# Patient Record
Sex: FEMALE | Race: WHITE | NOT HISPANIC OR LATINO
[De-identification: names, ages, dates, MRNs, and addresses within clinical notes are randomized per-mention and may not be internally consistent; named-entity substitution may affect disease eponyms.]

---

## 2017-01-09 ENCOUNTER — MEDICATION RENEWAL (OUTPATIENT)
Age: 72
End: 2017-01-09

## 2017-01-23 ENCOUNTER — APPOINTMENT (OUTPATIENT)
Dept: WOUND CARE | Facility: CLINIC | Age: 72
End: 2017-01-23

## 2017-01-27 ENCOUNTER — MEDICATION RENEWAL (OUTPATIENT)
Age: 72
End: 2017-01-27

## 2017-01-28 ENCOUNTER — MEDICATION RENEWAL (OUTPATIENT)
Age: 72
End: 2017-01-28

## 2017-01-30 ENCOUNTER — RX RENEWAL (OUTPATIENT)
Age: 72
End: 2017-01-30

## 2017-01-30 ENCOUNTER — APPOINTMENT (OUTPATIENT)
Dept: WOUND CARE | Facility: CLINIC | Age: 72
End: 2017-01-30

## 2017-02-09 ENCOUNTER — APPOINTMENT (OUTPATIENT)
Dept: WOUND CARE | Facility: CLINIC | Age: 72
End: 2017-02-09

## 2017-02-16 ENCOUNTER — APPOINTMENT (OUTPATIENT)
Dept: RADIOLOGY | Facility: IMAGING CENTER | Age: 72
End: 2017-02-16

## 2017-02-16 ENCOUNTER — OUTPATIENT (OUTPATIENT)
Dept: OUTPATIENT SERVICES | Facility: HOSPITAL | Age: 72
LOS: 1 days | End: 2017-02-16
Payer: MEDICARE

## 2017-02-16 ENCOUNTER — APPOINTMENT (OUTPATIENT)
Dept: WOUND CARE | Facility: CLINIC | Age: 72
End: 2017-02-16

## 2017-02-16 DIAGNOSIS — R06.2 WHEEZING: ICD-10-CM

## 2017-02-16 DIAGNOSIS — Z00.8 ENCOUNTER FOR OTHER GENERAL EXAMINATION: ICD-10-CM

## 2017-02-16 LAB
ALBUMIN SERPL ELPH-MCNC: 4.2 G/DL
ALP BLD-CCNC: 84 U/L
ALT SERPL-CCNC: 20 U/L
ANION GAP SERPL CALC-SCNC: 13 MMOL/L
AST SERPL-CCNC: 21 U/L
BASOPHILS # BLD AUTO: 0.02 K/UL
BASOPHILS NFR BLD AUTO: 0.3 %
BILIRUB SERPL-MCNC: 0.4 MG/DL
BUN SERPL-MCNC: 13 MG/DL
CALCIUM SERPL-MCNC: 9.3 MG/DL
CHLORIDE SERPL-SCNC: 104 MMOL/L
CHOLEST SERPL-MCNC: 189 MG/DL
CHOLEST/HDLC SERPL: 2.7 RATIO
CO2 SERPL-SCNC: 25 MMOL/L
CREAT SERPL-MCNC: 0.79 MG/DL
EOSINOPHIL # BLD AUTO: 0.09 K/UL
EOSINOPHIL NFR BLD AUTO: 1.2 %
GLUCOSE SERPL-MCNC: 109 MG/DL
HCT VFR BLD CALC: 43.9 %
HDLC SERPL-MCNC: 69 MG/DL
HGB BLD-MCNC: 13.8 G/DL
IMM GRANULOCYTES NFR BLD AUTO: 0.1 %
LDLC SERPL CALC-MCNC: 96 MG/DL
LYMPHOCYTES # BLD AUTO: 1.45 K/UL
LYMPHOCYTES NFR BLD AUTO: 19 %
MAN DIFF?: NORMAL
MCHC RBC-ENTMCNC: 27.8 PG
MCHC RBC-ENTMCNC: 31.4 GM/DL
MCV RBC AUTO: 88.5 FL
MONOCYTES # BLD AUTO: 0.57 K/UL
MONOCYTES NFR BLD AUTO: 7.5 %
NEUTROPHILS # BLD AUTO: 5.51 K/UL
NEUTROPHILS NFR BLD AUTO: 71.9 %
PLATELET # BLD AUTO: 252 K/UL
POTASSIUM SERPL-SCNC: 4.3 MMOL/L
PROT SERPL-MCNC: 6.5 G/DL
RBC # BLD: 4.96 M/UL
RBC # FLD: 14.8 %
SODIUM SERPL-SCNC: 142 MMOL/L
TRIGL SERPL-MCNC: 119 MG/DL
WBC # FLD AUTO: 7.65 K/UL

## 2017-02-16 PROCEDURE — 71046 X-RAY EXAM CHEST 2 VIEWS: CPT

## 2017-02-23 ENCOUNTER — APPOINTMENT (OUTPATIENT)
Dept: VASCULAR SURGERY | Facility: CLINIC | Age: 72
End: 2017-02-23

## 2017-02-23 ENCOUNTER — APPOINTMENT (OUTPATIENT)
Dept: WOUND CARE | Facility: CLINIC | Age: 72
End: 2017-02-23

## 2017-02-24 LAB
ESTIMATED AVERAGE GLUCOSE: 108 MG/DL
HBA1C MFR BLD HPLC: 5.4 %

## 2017-03-02 ENCOUNTER — APPOINTMENT (OUTPATIENT)
Dept: WOUND CARE | Facility: CLINIC | Age: 72
End: 2017-03-02

## 2017-03-02 DIAGNOSIS — B35.1 TINEA UNGUIUM: ICD-10-CM

## 2017-03-02 DIAGNOSIS — L60.0 TINEA UNGUIUM: ICD-10-CM

## 2017-03-06 ENCOUNTER — MEDICATION RENEWAL (OUTPATIENT)
Age: 72
End: 2017-03-06

## 2017-03-09 ENCOUNTER — APPOINTMENT (OUTPATIENT)
Dept: WOUND CARE | Facility: CLINIC | Age: 72
End: 2017-03-09

## 2017-03-09 ENCOUNTER — MEDICATION RENEWAL (OUTPATIENT)
Age: 72
End: 2017-03-09

## 2017-03-16 ENCOUNTER — APPOINTMENT (OUTPATIENT)
Dept: WOUND CARE | Facility: CLINIC | Age: 72
End: 2017-03-16

## 2017-03-21 ENCOUNTER — APPOINTMENT (OUTPATIENT)
Dept: WOUND CARE | Facility: CLINIC | Age: 72
End: 2017-03-21

## 2017-03-30 ENCOUNTER — APPOINTMENT (OUTPATIENT)
Dept: WOUND CARE | Facility: CLINIC | Age: 72
End: 2017-03-30

## 2017-04-11 ENCOUNTER — APPOINTMENT (OUTPATIENT)
Dept: WOUND CARE | Facility: CLINIC | Age: 72
End: 2017-04-11

## 2017-04-11 VITALS
BODY MASS INDEX: 53.92 KG/M2 | SYSTOLIC BLOOD PRESSURE: 132 MMHG | WEIGHT: 293 LBS | RESPIRATION RATE: 16 BRPM | DIASTOLIC BLOOD PRESSURE: 84 MMHG | TEMPERATURE: 98.6 F | HEIGHT: 62 IN

## 2017-04-11 DIAGNOSIS — H54.7 UNSPECIFIED VISUAL LOSS: ICD-10-CM

## 2017-04-27 ENCOUNTER — APPOINTMENT (OUTPATIENT)
Dept: WOUND CARE | Facility: CLINIC | Age: 72
End: 2017-04-27

## 2017-04-27 DIAGNOSIS — L03.116 CELLULITIS OF LEFT LOWER LIMB: ICD-10-CM

## 2017-04-27 DIAGNOSIS — S81.809S UNSPECIFIED OPEN WOUND, UNSPECIFIED LOWER LEG, SEQUELA: ICD-10-CM

## 2017-05-18 ENCOUNTER — APPOINTMENT (OUTPATIENT)
Dept: WOUND CARE | Facility: CLINIC | Age: 72
End: 2017-05-18

## 2017-05-18 DIAGNOSIS — L30.9 DERMATITIS, UNSPECIFIED: ICD-10-CM

## 2017-06-09 ENCOUNTER — APPOINTMENT (OUTPATIENT)
Dept: FAMILY MEDICINE | Facility: CLINIC | Age: 72
End: 2017-06-09

## 2017-06-09 VITALS
SYSTOLIC BLOOD PRESSURE: 150 MMHG | WEIGHT: 290 LBS | DIASTOLIC BLOOD PRESSURE: 94 MMHG | HEIGHT: 62 IN | BODY MASS INDEX: 53.37 KG/M2

## 2017-06-23 ENCOUNTER — APPOINTMENT (OUTPATIENT)
Dept: FAMILY MEDICINE | Facility: CLINIC | Age: 72
End: 2017-06-23

## 2017-08-08 ENCOUNTER — APPOINTMENT (OUTPATIENT)
Dept: FAMILY MEDICINE | Facility: CLINIC | Age: 72
End: 2017-08-08
Payer: MEDICARE

## 2017-08-08 VITALS
SYSTOLIC BLOOD PRESSURE: 160 MMHG | HEIGHT: 62 IN | DIASTOLIC BLOOD PRESSURE: 90 MMHG | BODY MASS INDEX: 53.37 KG/M2 | WEIGHT: 290 LBS

## 2017-08-08 VITALS — SYSTOLIC BLOOD PRESSURE: 140 MMHG | DIASTOLIC BLOOD PRESSURE: 86 MMHG

## 2017-08-08 DIAGNOSIS — E78.00 PURE HYPERCHOLESTEROLEMIA, UNSPECIFIED: ICD-10-CM

## 2017-08-08 DIAGNOSIS — Z00.00 ENCOUNTER FOR GENERAL ADULT MEDICAL EXAMINATION W/OUT ABNORMAL FINDINGS: ICD-10-CM

## 2017-08-08 PROCEDURE — 99214 OFFICE O/P EST MOD 30 MIN: CPT

## 2017-08-09 LAB
ALBUMIN SERPL ELPH-MCNC: 4.2 G/DL
ALP BLD-CCNC: 75 U/L
ALT SERPL-CCNC: 20 U/L
ANION GAP SERPL CALC-SCNC: 14 MMOL/L
APPEARANCE: ABNORMAL
AST SERPL-CCNC: 23 U/L
BACTERIA: ABNORMAL
BASOPHILS # BLD AUTO: 0.02 K/UL
BASOPHILS NFR BLD AUTO: 0.4 %
BILIRUB SERPL-MCNC: 0.7 MG/DL
BILIRUBIN URINE: NEGATIVE
BLOOD URINE: NEGATIVE
BUN SERPL-MCNC: 12 MG/DL
CALCIUM SERPL-MCNC: 9.1 MG/DL
CHLORIDE SERPL-SCNC: 100 MMOL/L
CHOLEST SERPL-MCNC: 228 MG/DL
CHOLEST/HDLC SERPL: 3.4 RATIO
CO2 SERPL-SCNC: 27 MMOL/L
COLOR: YELLOW
CREAT SERPL-MCNC: 0.85 MG/DL
EOSINOPHIL # BLD AUTO: 0.1 K/UL
EOSINOPHIL NFR BLD AUTO: 1.8 %
GLUCOSE QUALITATIVE U: NORMAL MG/DL
GLUCOSE SERPL-MCNC: 111 MG/DL
HBA1C MFR BLD HPLC: 5.4 %
HCT VFR BLD CALC: 44.8 %
HDLC SERPL-MCNC: 68 MG/DL
HGB BLD-MCNC: 14.5 G/DL
HYALINE CASTS: 5 /LPF
IMM GRANULOCYTES NFR BLD AUTO: 0.2 %
KETONES URINE: NEGATIVE
LDLC SERPL CALC-MCNC: 131 MG/DL
LEUKOCYTE ESTERASE URINE: NEGATIVE
LYMPHOCYTES # BLD AUTO: 1.5 K/UL
LYMPHOCYTES NFR BLD AUTO: 27.7 %
MAN DIFF?: NORMAL
MCHC RBC-ENTMCNC: 28.2 PG
MCHC RBC-ENTMCNC: 32.4 GM/DL
MCV RBC AUTO: 87.2 FL
MICROSCOPIC-UA: NORMAL
MONOCYTES # BLD AUTO: 0.33 K/UL
MONOCYTES NFR BLD AUTO: 6.1 %
NEUTROPHILS # BLD AUTO: 3.45 K/UL
NEUTROPHILS NFR BLD AUTO: 63.8 %
NITRITE URINE: NEGATIVE
PH URINE: 6.5
PLATELET # BLD AUTO: 210 K/UL
POTASSIUM SERPL-SCNC: 4.1 MMOL/L
PROT SERPL-MCNC: 7.1 G/DL
PROTEIN URINE: NEGATIVE MG/DL
RBC # BLD: 5.14 M/UL
RBC # FLD: 14.5 %
RED BLOOD CELLS URINE: 2 /HPF
SODIUM SERPL-SCNC: 141 MMOL/L
SPECIFIC GRAVITY URINE: 1.02
SQUAMOUS EPITHELIAL CELLS: 20 /HPF
TRIGL SERPL-MCNC: 145 MG/DL
TSH SERPL-ACNC: 4.05 UIU/ML
UROBILINOGEN URINE: NORMAL MG/DL
WBC # FLD AUTO: 5.41 K/UL
WHITE BLOOD CELLS URINE: 4 /HPF

## 2017-08-17 ENCOUNTER — MEDICATION RENEWAL (OUTPATIENT)
Age: 72
End: 2017-08-17

## 2017-10-26 ENCOUNTER — MEDICATION RENEWAL (OUTPATIENT)
Age: 72
End: 2017-10-26

## 2018-01-29 ENCOUNTER — APPOINTMENT (OUTPATIENT)
Dept: FAMILY MEDICINE | Facility: CLINIC | Age: 73
End: 2018-01-29
Payer: MEDICARE

## 2018-01-29 ENCOUNTER — MEDICATION RENEWAL (OUTPATIENT)
Age: 73
End: 2018-01-29

## 2018-01-29 VITALS
HEIGHT: 62 IN | BODY MASS INDEX: 53.37 KG/M2 | WEIGHT: 290 LBS | SYSTOLIC BLOOD PRESSURE: 138 MMHG | DIASTOLIC BLOOD PRESSURE: 96 MMHG

## 2018-01-29 DIAGNOSIS — M25.552 PAIN IN LEFT HIP: ICD-10-CM

## 2018-01-29 DIAGNOSIS — I10 ESSENTIAL (PRIMARY) HYPERTENSION: ICD-10-CM

## 2018-01-29 PROCEDURE — 36415 COLL VENOUS BLD VENIPUNCTURE: CPT

## 2018-01-29 PROCEDURE — 99213 OFFICE O/P EST LOW 20 MIN: CPT | Mod: 25

## 2018-01-29 RX ORDER — GABAPENTIN 300 MG/1
300 CAPSULE ORAL TWICE DAILY
Qty: 60 | Refills: 0 | Status: DISCONTINUED | COMMUNITY
Start: 2017-06-09 | End: 2018-01-29

## 2018-01-29 RX ORDER — CLINDAMYCIN HYDROCHLORIDE 300 MG/1
300 CAPSULE ORAL
Qty: 14 | Refills: 1 | Status: DISCONTINUED | COMMUNITY
Start: 2017-02-16 | End: 2018-01-29

## 2018-01-29 RX ORDER — LEVOFLOXACIN 500 MG/1
500 TABLET, FILM COATED ORAL DAILY
Qty: 7 | Refills: 1 | Status: DISCONTINUED | COMMUNITY
Start: 2017-02-16 | End: 2018-01-29

## 2018-01-29 RX ORDER — CARISOPRODOL 350 MG/1
350 TABLET ORAL
Qty: 30 | Refills: 0 | Status: DISCONTINUED | COMMUNITY
Start: 2017-03-09 | End: 2018-01-29

## 2018-01-29 RX ORDER — SODIUM HYPOCHLORITE 1.25 MG/ML
0.12 SOLUTION TOPICAL
Qty: 3 | Refills: 3 | Status: DISCONTINUED | COMMUNITY
Start: 2017-01-24 | End: 2018-01-29

## 2018-01-29 RX ORDER — CYCLOBENZAPRINE HYDROCHLORIDE 5 MG/1
5 TABLET, FILM COATED ORAL
Qty: 30 | Refills: 0 | Status: DISCONTINUED | COMMUNITY
Start: 2017-03-06 | End: 2018-01-29

## 2018-01-29 RX ORDER — LEVOFLOXACIN 500 MG/1
500 TABLET, FILM COATED ORAL DAILY
Qty: 10 | Refills: 0 | Status: DISCONTINUED | COMMUNITY
Start: 2017-01-30 | End: 2018-01-29

## 2018-01-29 RX ORDER — METRONIDAZOLE 500 MG/1
500 TABLET ORAL 3 TIMES DAILY
Qty: 21 | Refills: 0 | Status: DISCONTINUED | COMMUNITY
Start: 2017-03-30 | End: 2018-01-29

## 2018-01-29 RX ORDER — ZINC OXIDE AND DIMETHICONE 120; 10 MG/G; MG/G
CREAM TOPICAL
Qty: 1 | Refills: 5 | Status: DISCONTINUED | COMMUNITY
Start: 2017-05-18 | End: 2018-01-29

## 2018-01-31 LAB
ALBUMIN SERPL ELPH-MCNC: 4.2 G/DL
ALP BLD-CCNC: 78 U/L
ALT SERPL-CCNC: 14 U/L
ANION GAP SERPL CALC-SCNC: 13 MMOL/L
AST SERPL-CCNC: 15 U/L
BASOPHILS # BLD AUTO: 0.01 K/UL
BASOPHILS NFR BLD AUTO: 0.2 %
BILIRUB SERPL-MCNC: 0.5 MG/DL
BUN SERPL-MCNC: 12 MG/DL
CALCIUM SERPL-MCNC: 9.3 MG/DL
CHLORIDE SERPL-SCNC: 101 MMOL/L
CO2 SERPL-SCNC: 28 MMOL/L
CREAT SERPL-MCNC: 0.8 MG/DL
EOSINOPHIL # BLD AUTO: 0.08 K/UL
EOSINOPHIL NFR BLD AUTO: 1.4 %
GLUCOSE SERPL-MCNC: 103 MG/DL
HCT VFR BLD CALC: 45.7 %
HGB BLD-MCNC: 14.3 G/DL
IMM GRANULOCYTES NFR BLD AUTO: 0.2 %
LYMPHOCYTES # BLD AUTO: 1.5 K/UL
LYMPHOCYTES NFR BLD AUTO: 25.3 %
MAN DIFF?: NORMAL
MCHC RBC-ENTMCNC: 27.7 PG
MCHC RBC-ENTMCNC: 31.3 GM/DL
MCV RBC AUTO: 88.6 FL
MONOCYTES # BLD AUTO: 0.43 K/UL
MONOCYTES NFR BLD AUTO: 7.3 %
NEUTROPHILS # BLD AUTO: 3.89 K/UL
NEUTROPHILS NFR BLD AUTO: 65.6 %
PLATELET # BLD AUTO: 208 K/UL
POTASSIUM SERPL-SCNC: 4.5 MMOL/L
PROT SERPL-MCNC: 6.7 G/DL
RBC # BLD: 5.16 M/UL
RBC # FLD: 14.4 %
SODIUM SERPL-SCNC: 142 MMOL/L
WBC # FLD AUTO: 5.92 K/UL

## 2018-05-04 ENCOUNTER — APPOINTMENT (OUTPATIENT)
Dept: FAMILY MEDICINE | Facility: CLINIC | Age: 73
End: 2018-05-04
Payer: MEDICARE

## 2018-05-04 VITALS
OXYGEN SATURATION: 95 % | SYSTOLIC BLOOD PRESSURE: 160 MMHG | HEIGHT: 62 IN | HEART RATE: 80 BPM | DIASTOLIC BLOOD PRESSURE: 100 MMHG | WEIGHT: 293 LBS | BODY MASS INDEX: 53.92 KG/M2

## 2018-05-04 DIAGNOSIS — Z23 ENCOUNTER FOR IMMUNIZATION: ICD-10-CM

## 2018-05-04 PROCEDURE — 90670 PCV13 VACCINE IM: CPT

## 2018-05-04 PROCEDURE — 99214 OFFICE O/P EST MOD 30 MIN: CPT | Mod: 25

## 2018-05-04 PROCEDURE — G0009: CPT

## 2018-05-30 ENCOUNTER — APPOINTMENT (OUTPATIENT)
Dept: FAMILY MEDICINE | Facility: CLINIC | Age: 73
End: 2018-05-30
Payer: MEDICARE

## 2018-05-30 VITALS
HEIGHT: 62 IN | DIASTOLIC BLOOD PRESSURE: 90 MMHG | BODY MASS INDEX: 53.92 KG/M2 | WEIGHT: 293 LBS | SYSTOLIC BLOOD PRESSURE: 160 MMHG

## 2018-05-30 PROCEDURE — 99213 OFFICE O/P EST LOW 20 MIN: CPT

## 2018-05-30 RX ORDER — VALSARTAN 80 MG/1
80 TABLET, COATED ORAL DAILY
Qty: 30 | Refills: 0 | Status: DISCONTINUED | COMMUNITY
Start: 2018-05-04 | End: 2018-05-30

## 2018-05-30 NOTE — HISTORY OF PRESENT ILLNESS
[FreeTextEntry1] : fu [de-identified] : 72 year old female is here for a followup visit.  Medications and allergies were reviewed and assessed.  There has been no new medications since the last visit. \par

## 2018-05-30 NOTE — REVIEW OF SYSTEMS
[Joint Pain] : joint pain [Muscle Pain] : muscle pain [Negative] : Heme/Lymph [FreeTextEntry9] : leg pain

## 2018-05-30 NOTE — HEALTH RISK ASSESSMENT
[] : No [No falls in past year] : Patient reported no falls in the past year [0] : 2) Feeling down, depressed, or hopeless: Not at all (0) [[id number]] : 0

## 2018-05-30 NOTE — ASSESSMENT
[FreeTextEntry1] : The patient has a diagnosis of hypertension, however patient has been a little dizzy since the start of valsartan.  The diagnosis was discussed with patient and need for medication compliance and possible side affects and risks of noncompliance. Patient was told to adhere to a low salt diet and try to incorporate exercise daily.\par will try losartan, take half a pill for a week then a full pill and reassess in 3 months\par

## 2018-05-30 NOTE — PHYSICAL EXAM
[Well Nourished] : well nourished [Normal Oropharynx] : the oropharynx was normal [Clear to Auscultation] : lungs were clear to auscultation bilaterally [Regular Rhythm] : with a regular rhythm [Normal S1, S2] : normal S1 and S2 [Soft] : abdomen soft [Normal Insight/Judgement] : insight and judgment were intact [de-identified] : + lymphadema bilateral lower extremities, walks with cane

## 2018-06-13 ENCOUNTER — APPOINTMENT (OUTPATIENT)
Dept: FAMILY MEDICINE | Facility: CLINIC | Age: 73
End: 2018-06-13

## 2018-07-23 ENCOUNTER — MEDICATION RENEWAL (OUTPATIENT)
Age: 73
End: 2018-07-23

## 2018-12-26 ENCOUNTER — APPOINTMENT (OUTPATIENT)
Dept: FAMILY MEDICINE | Facility: CLINIC | Age: 73
End: 2018-12-26
Payer: MEDICARE

## 2018-12-26 VITALS
HEART RATE: 100 BPM | DIASTOLIC BLOOD PRESSURE: 112 MMHG | RESPIRATION RATE: 20 BRPM | WEIGHT: 293 LBS | BODY MASS INDEX: 53.92 KG/M2 | SYSTOLIC BLOOD PRESSURE: 176 MMHG | HEIGHT: 62 IN | OXYGEN SATURATION: 100 %

## 2018-12-26 PROCEDURE — 99214 OFFICE O/P EST MOD 30 MIN: CPT

## 2018-12-26 RX ORDER — LOSARTAN POTASSIUM 100 MG/1
100 TABLET, FILM COATED ORAL
Qty: 30 | Refills: 0 | Status: DISCONTINUED | COMMUNITY
Start: 2018-05-30 | End: 2018-12-26

## 2018-12-26 NOTE — HISTORY OF PRESENT ILLNESS
[FreeTextEntry1] : fu [de-identified] : 72 year old female is here for a followup visit.  Medications and allergies were reviewed and assessed.  There has been no new medications since the last visit. \par has been doing well with her pain medications\par only using here and there\par

## 2018-12-26 NOTE — HEALTH RISK ASSESSMENT
[] : No [No falls in past year] : Patient reported no falls in the past year [0] : 2) Feeling down, depressed, or hopeless: Not at all (0) [[id number]] : 0

## 2018-12-26 NOTE — ASSESSMENT
[FreeTextEntry1] : The patient has a diagnosis of hypertension, however patient has been a little dizzy since the start of valsartan.  The diagnosis was discussed with patient and need for medication compliance and possible side affects and risks of noncompliance. Patient was told to adhere to a low salt diet and try to incorporate exercise daily.\par failed losartan - did not feel good on it\par never came for fu\par also did not like valsartan\par will amlodipine\par \par chronic pain - has been doing well until this week when it has gotten cold\par uses sparingly\par refer to cardiology\par \par patient is non complaint\par discussed risks including mi, cva, death\par

## 2018-12-26 NOTE — PHYSICAL EXAM
[Well Nourished] : well nourished [Normal Oropharynx] : the oropharynx was normal [Clear to Auscultation] : lungs were clear to auscultation bilaterally [Regular Rhythm] : with a regular rhythm [Normal S1, S2] : normal S1 and S2 [Soft] : abdomen soft [Normal Insight/Judgement] : insight and judgment were intact [de-identified] : + lymphadema bilateral lower extremities, walks with cane

## 2019-01-09 ENCOUNTER — APPOINTMENT (OUTPATIENT)
Dept: FAMILY MEDICINE | Facility: CLINIC | Age: 74
End: 2019-01-09
Payer: MEDICARE

## 2019-01-09 VITALS
SYSTOLIC BLOOD PRESSURE: 160 MMHG | HEIGHT: 62 IN | OXYGEN SATURATION: 90 % | DIASTOLIC BLOOD PRESSURE: 90 MMHG | RESPIRATION RATE: 20 BRPM | HEART RATE: 91 BPM | WEIGHT: 293 LBS | BODY MASS INDEX: 53.92 KG/M2

## 2019-01-09 DIAGNOSIS — J06.9 ACUTE UPPER RESPIRATORY INFECTION, UNSPECIFIED: ICD-10-CM

## 2019-01-09 PROCEDURE — 99214 OFFICE O/P EST MOD 30 MIN: CPT

## 2019-01-09 NOTE — HISTORY OF PRESENT ILLNESS
[FreeTextEntry8] : 73 year old female here with complaints of constant hacking cough for two days. Patients active medications, allergies and issues were all reviewed with the patient at time of visit.\par \par also here for blood pressure fu\par so far has no side affects from the medications

## 2019-01-09 NOTE — HEALTH RISK ASSESSMENT
[No falls in past year] : Patient reported no falls in the past year [0] : 2) Feeling down, depressed, or hopeless: Not at all (0) [] : No [[id number]] : 0

## 2019-01-09 NOTE — PHYSICAL EXAM
[Well Nourished] : well nourished [Normal Oropharynx] : the oropharynx was normal [Regular Rhythm] : with a regular rhythm [Normal S1, S2] : normal S1 and S2 [Soft] : abdomen soft [Normal Insight/Judgement] : insight and judgment were intact [de-identified] : wheezes, rhonchi [de-identified] : + lymphadema bilateral lower extremities, walks with cane

## 2019-01-09 NOTE — REVIEW OF SYSTEMS
[Wheezing] : wheezing [Cough] : cough [Joint Pain] : joint pain [Muscle Pain] : muscle pain [Negative] : Heme/Lymph [FreeTextEntry9] : leg pain

## 2019-01-09 NOTE — ASSESSMENT
[FreeTextEntry1] : Patient was advised to take all medications as prescribed and to finish any antibiotics in their entirety. Patient was told to rest, hydrate and treat symptoms as necessary. Patient was advised to return to this office or go directly to the ER if symptoms do not improve or if any worsening occurs.\par \par The patient has a diagnosis of hypertension.  The diagnosis was discussed with patient and need for medication compliance and possible side affects and risks of noncompliance. Patient was told to adhere to a low salt diet and try to incorporate exercise daily.\par \par increase amlodipine to 10 and reassess in 2 weeks\par

## 2019-04-08 ENCOUNTER — INPATIENT (INPATIENT)
Facility: HOSPITAL | Age: 74
LOS: 7 days | Discharge: HOME HEALTH | End: 2019-04-16
Attending: FAMILY MEDICINE | Admitting: FAMILY MEDICINE
Payer: MEDICARE

## 2019-04-08 VITALS
HEIGHT: 62 IN | OXYGEN SATURATION: 91 % | RESPIRATION RATE: 32 BRPM | WEIGHT: 293 LBS | DIASTOLIC BLOOD PRESSURE: 82 MMHG | SYSTOLIC BLOOD PRESSURE: 186 MMHG | HEART RATE: 91 BPM

## 2019-04-08 DIAGNOSIS — L60.2 ONYCHOGRYPHOSIS: ICD-10-CM

## 2019-04-08 DIAGNOSIS — J44.1 CHRONIC OBSTRUCTIVE PULMONARY DISEASE WITH (ACUTE) EXACERBATION: ICD-10-CM

## 2019-04-08 DIAGNOSIS — I10 ESSENTIAL (PRIMARY) HYPERTENSION: ICD-10-CM

## 2019-04-08 DIAGNOSIS — J96.21 ACUTE AND CHRONIC RESPIRATORY FAILURE WITH HYPOXIA: ICD-10-CM

## 2019-04-08 LAB
ALBUMIN SERPL ELPH-MCNC: 3.7 G/DL — SIGNIFICANT CHANGE UP (ref 3.3–5)
ALP SERPL-CCNC: 88 U/L — SIGNIFICANT CHANGE UP (ref 40–120)
ALT FLD-CCNC: 56 U/L — SIGNIFICANT CHANGE UP (ref 12–78)
ANION GAP SERPL CALC-SCNC: 7 MMOL/L — SIGNIFICANT CHANGE UP (ref 5–17)
APTT BLD: 28.9 SEC — SIGNIFICANT CHANGE UP (ref 28.5–37)
AST SERPL-CCNC: 27 U/L — SIGNIFICANT CHANGE UP (ref 15–37)
BASE EXCESS BLDA CALC-SCNC: 6.8 MMOL/L — HIGH (ref -2–2)
BILIRUB SERPL-MCNC: 0.7 MG/DL — SIGNIFICANT CHANGE UP (ref 0.2–1.2)
BLOOD GAS COMMENTS: SIGNIFICANT CHANGE UP
BLOOD GAS COMMENTS: SIGNIFICANT CHANGE UP
BLOOD GAS SOURCE: SIGNIFICANT CHANGE UP
BUN SERPL-MCNC: 15 MG/DL — SIGNIFICANT CHANGE UP (ref 7–23)
CALCIUM SERPL-MCNC: 8.5 MG/DL — SIGNIFICANT CHANGE UP (ref 8.5–10.1)
CHLORIDE SERPL-SCNC: 99 MMOL/L — SIGNIFICANT CHANGE UP (ref 96–108)
CO2 SERPL-SCNC: 35 MMOL/L — HIGH (ref 22–31)
CREAT SERPL-MCNC: 0.77 MG/DL — SIGNIFICANT CHANGE UP (ref 0.5–1.3)
GLUCOSE SERPL-MCNC: 123 MG/DL — HIGH (ref 70–99)
HCO3 BLDA-SCNC: 33 MMOL/L — HIGH (ref 21–29)
HCT VFR BLD CALC: 47.3 % — HIGH (ref 34.5–45)
HGB BLD-MCNC: 14.9 G/DL — SIGNIFICANT CHANGE UP (ref 11.5–15.5)
HOROWITZ INDEX BLDA+IHG-RTO: 30 — SIGNIFICANT CHANGE UP
INR BLD: 1.09 RATIO — SIGNIFICANT CHANGE UP (ref 0.88–1.16)
LACTATE SERPL-SCNC: 1.1 MMOL/L — SIGNIFICANT CHANGE UP (ref 0.7–2)
LIDOCAIN IGE QN: 71 U/L — LOW (ref 73–393)
MCHC RBC-ENTMCNC: 27.3 PG — SIGNIFICANT CHANGE UP (ref 27–34)
MCHC RBC-ENTMCNC: 31.5 GM/DL — LOW (ref 32–36)
MCV RBC AUTO: 86.6 FL — SIGNIFICANT CHANGE UP (ref 80–100)
NRBC # BLD: 0 /100 WBCS — SIGNIFICANT CHANGE UP (ref 0–0)
NT-PROBNP SERPL-SCNC: 486 PG/ML — HIGH (ref 0–125)
PCO2 BLDA: 58 MMHG — HIGH (ref 32–46)
PH BLD: 7.38 — SIGNIFICANT CHANGE UP (ref 7.35–7.45)
PLATELET # BLD AUTO: 239 K/UL — SIGNIFICANT CHANGE UP (ref 150–400)
PO2 BLDA: 81 MMHG — SIGNIFICANT CHANGE UP (ref 74–108)
POTASSIUM SERPL-MCNC: 3.7 MMOL/L — SIGNIFICANT CHANGE UP (ref 3.5–5.3)
POTASSIUM SERPL-SCNC: 3.7 MMOL/L — SIGNIFICANT CHANGE UP (ref 3.5–5.3)
PROT SERPL-MCNC: 8 GM/DL — SIGNIFICANT CHANGE UP (ref 6–8.3)
PROTHROM AB SERPL-ACNC: 12.2 SEC — SIGNIFICANT CHANGE UP (ref 10–12.9)
RBC # BLD: 5.46 M/UL — HIGH (ref 3.8–5.2)
RBC # FLD: 13.9 % — SIGNIFICANT CHANGE UP (ref 10.3–14.5)
SAO2 % BLDA: 96 % — SIGNIFICANT CHANGE UP (ref 92–96)
SODIUM SERPL-SCNC: 141 MMOL/L — SIGNIFICANT CHANGE UP (ref 135–145)
TROPONIN I SERPL-MCNC: <.015 NG/ML — SIGNIFICANT CHANGE UP (ref 0.01–0.04)
WBC # BLD: 6.36 K/UL — SIGNIFICANT CHANGE UP (ref 3.8–10.5)
WBC # FLD AUTO: 6.36 K/UL — SIGNIFICANT CHANGE UP (ref 3.8–10.5)

## 2019-04-08 PROCEDURE — 71045 X-RAY EXAM CHEST 1 VIEW: CPT | Mod: 26

## 2019-04-08 PROCEDURE — 99291 CRITICAL CARE FIRST HOUR: CPT

## 2019-04-08 PROCEDURE — 99223 1ST HOSP IP/OBS HIGH 75: CPT

## 2019-04-08 PROCEDURE — 93010 ELECTROCARDIOGRAM REPORT: CPT

## 2019-04-08 RX ORDER — ENOXAPARIN SODIUM 100 MG/ML
40 INJECTION SUBCUTANEOUS EVERY 24 HOURS
Qty: 0 | Refills: 0 | Status: DISCONTINUED | OUTPATIENT
Start: 2019-04-08 | End: 2019-04-16

## 2019-04-08 RX ORDER — IPRATROPIUM/ALBUTEROL SULFATE 18-103MCG
3 AEROSOL WITH ADAPTER (GRAM) INHALATION
Qty: 0 | Refills: 0 | Status: COMPLETED | OUTPATIENT
Start: 2019-04-08 | End: 2019-04-08

## 2019-04-08 RX ORDER — ALBUTEROL 90 UG/1
1 AEROSOL, METERED ORAL EVERY 4 HOURS
Qty: 0 | Refills: 0 | Status: DISCONTINUED | OUTPATIENT
Start: 2019-04-08 | End: 2019-04-16

## 2019-04-08 RX ORDER — TIOTROPIUM BROMIDE 18 UG/1
1 CAPSULE ORAL; RESPIRATORY (INHALATION) DAILY
Qty: 0 | Refills: 0 | Status: DISCONTINUED | OUTPATIENT
Start: 2019-04-08 | End: 2019-04-16

## 2019-04-08 RX ORDER — AZITHROMYCIN 500 MG/1
500 TABLET, FILM COATED ORAL EVERY 24 HOURS
Qty: 0 | Refills: 0 | Status: DISCONTINUED | OUTPATIENT
Start: 2019-04-09 | End: 2019-04-12

## 2019-04-08 RX ORDER — AZITHROMYCIN 500 MG/1
TABLET, FILM COATED ORAL
Qty: 0 | Refills: 0 | Status: DISCONTINUED | OUTPATIENT
Start: 2019-04-08 | End: 2019-04-12

## 2019-04-08 RX ORDER — IPRATROPIUM/ALBUTEROL SULFATE 18-103MCG
3 AEROSOL WITH ADAPTER (GRAM) INHALATION EVERY 6 HOURS
Qty: 0 | Refills: 0 | Status: DISCONTINUED | OUTPATIENT
Start: 2019-04-08 | End: 2019-04-16

## 2019-04-08 RX ORDER — LISINOPRIL 2.5 MG/1
10 TABLET ORAL DAILY
Qty: 0 | Refills: 0 | Status: DISCONTINUED | OUTPATIENT
Start: 2019-04-08 | End: 2019-04-16

## 2019-04-08 RX ORDER — AZITHROMYCIN 500 MG/1
500 TABLET, FILM COATED ORAL ONCE
Qty: 0 | Refills: 0 | Status: COMPLETED | OUTPATIENT
Start: 2019-04-08 | End: 2019-04-08

## 2019-04-08 RX ADMIN — Medication 3 MILLILITER(S): at 15:36

## 2019-04-08 RX ADMIN — AZITHROMYCIN 255 MILLIGRAM(S): 500 TABLET, FILM COATED ORAL at 19:41

## 2019-04-08 RX ADMIN — Medication 3 MILLILITER(S): at 15:46

## 2019-04-08 RX ADMIN — Medication 40 MILLIGRAM(S): at 23:39

## 2019-04-08 RX ADMIN — ENOXAPARIN SODIUM 40 MILLIGRAM(S): 100 INJECTION SUBCUTANEOUS at 23:13

## 2019-04-08 RX ADMIN — Medication 3 MILLILITER(S): at 16:00

## 2019-04-08 RX ADMIN — Medication 125 MILLIGRAM(S): at 15:14

## 2019-04-08 NOTE — PATIENT PROFILE ADULT - FUNCTIONAL SCREEN CURRENT LEVEL: COMMUNICATION, MLM
3 = unable to speak (not related to language barrier) 0 = understands/communicates without difficulty

## 2019-04-08 NOTE — ED PROVIDER NOTE - OBJECTIVE STATEMENT
Pt is a 74 yo lady with a pmhx of HTN who presents to the ED with sob. Has been going on for 3 days. Is a current smoker, does not have formal COPD diagnosis, but thinks she has emphysema. No chest pain, no fevers, no abdominal pain, no n/v/d. No dysuria.

## 2019-04-08 NOTE — H&P ADULT - HISTORY OF PRESENT ILLNESS
Pt is a 72 yo lady with a past medical historyx of HTN who presents to the ED with sob. Has been going on for 3 days. Is a current smoker, does not have formal COPD diagnosis, but thinks she has emphysema. No chest pain, no fevers, no abdominal pain, no n/v/d. No dysuria. Pt is a 72 yo lady with a past medical historyx of HTN who presents to the ED with sob. Has been going on for 3 days. Is a current smoker, does not have formal COPD diagnosis, but thinks she has emphysema. No chest pain, no fevers, no abdominal pain, no n/v/d. No dysuria.- she feels that she got sick from her sick sister - patient coughs with no sputum production

## 2019-04-08 NOTE — ED ADULT NURSE REASSESSMENT NOTE - NS ED NURSE REASSESS COMMENT FT1
PT general condition remains stable 95 % on BIPAP no respiratory distress noted. Lymphedema present to BLE, heplock to RUE intact.  PT pending US endorsed to RN for continued care. pt additionally stated will not provided UA w/o commode receiving nurse made aware.

## 2019-04-08 NOTE — H&P ADULT - NSHPPHYSICALEXAM_GEN_ALL_CORE
ICU Vital Signs Last 24 Hrs  T(C): 36.6 (08 Apr 2019 15:10), Max: 36.6 (08 Apr 2019 15:10)  T(F): 97.9 (08 Apr 2019 15:10), Max: 97.9 (08 Apr 2019 15:10)  HR: 92 (08 Apr 2019 15:42) (77 - 92)  BP: 158/75 (08 Apr 2019 15:34) (158/75 - 186/82)  BP(mean): --  ABP: --  ABP(mean): --  RR: 25 (08 Apr 2019 15:34) (25 - 32)  SpO2: 95% (08 Apr 2019 15:42) (91% - 95%)  GENERAL: NAD well-developed  HEAD:  Atraumatic, Normocephalic  EYES: EOMI, PERRLA, conjunctiva and sclera clear  ENMT: No tonsillar erythema, exudates, or enlargement; Moist mucous membranes, Good dentition, No lesions  NECK: Supple, No JVD, Normal thyroid  NERVOUS SYSTEM:  Alert & Oriented X3, Good concentration; Motor Strength 5/5 B/L upper and lower extremities; DTRs 2+ intact and symmetric  CHEST/LUNG: Clear to percussion bilaterally; No rales, rhonchi, wheezing, or rubs  HEART: Regular rate and rhythm; No murmurs, rubs, or gallops  ABDOMEN: Soft, Nontender, Nondistended; Bowel sounds present  EXTREMITIES:  2+ Peripheral Pulses, No clubbing, cyanosis, or edema  LYMPH: No lymphadenopathy   SKIN: No rashes or lesions ICU Vital Signs Last 24 Hrs  T(C): 36.6 (08 Apr 2019 15:10), Max: 36.6 (08 Apr 2019 15:10)  T(F): 97.9 (08 Apr 2019 15:10), Max: 97.9 (08 Apr 2019 15:10)  HR: 92 (08 Apr 2019 15:42) (77 - 92)  BP: 158/75 (08 Apr 2019 15:34) (158/75 - 186/82)  BP(mean): --  ABP: --  ABP(mean): --  RR: 25 (08 Apr 2019 15:34) (25 - 32)  SpO2: 95% (08 Apr 2019 15:42) (91% - 95%)  GENERAL: NAD well-developed  HEAD:  Atraumatic, Normocephalic  EYES: EOMI, PERRLA, conjunctiva and sclera clear  ENMT: No tonsillar erythema, exudates, or enlargement; Moist mucous membranes, Good dentition, No lesions  NECK: Supple, No JVD, Normal thyroid  NERVOUS SYSTEM:  Alert & Oriented X3, Good concentration; Motor Strength 5/5 B/L upper and lower extremities; DTRs 2+ intact and symmetric  CHEST/LUNG: Clear to percussion bilaterally; No rales, rhonchi, wheezing, or rubs  HEART: Regular rate and rhythm; No murmurs, rubs, or gallops  ABDOMEN: Soft, Nontender, Nondistended; Bowel sounds present  EXTREMITIES:  2+ Peripheral Pulses, No clubbing, cyanosis, POSITIVE lymphedema  LYMPH: 4+ bilateral lym[phedema to knees   SKIN: No rashes or lesions ICU Vital Signs Last 24 Hrs  T(C): 36.6 (08 Apr 2019 15:10), Max: 36.6 (08 Apr 2019 15:10)  T(F): 97.9 (08 Apr 2019 15:10), Max: 97.9 (08 Apr 2019 15:10)  HR: 92 (08 Apr 2019 15:42) (77 - 92)  BP: 158/75 (08 Apr 2019 15:34) (158/75 - 186/82)  BP(mean): --  ABP: --  ABP(mean): --  RR: 25 (08 Apr 2019 15:34) (25 - 32)  SpO2: 95% (08 Apr 2019 15:42) (91% - 95%)  GENERAL: NAD well-developed  HEAD:  Atraumatic, Normocephalic  EYES: EOMI, PERRLA, conjunctiva and sclera clear  ENMT: No tonsillar erythema, exudates, or enlargement; Moist mucous membranes, Good dentition, No lesions  NECK: Supple, No JVD, Normal thyroid  NERVOUS SYSTEM:  Alert & Oriented X3, Good concentration; Motor Strength 5/5 B/L upper and lower extremities; DTRs 2+ intact and symmetric  CHEST/LUNG: Clear to percussion bilaterally; No rales, rhonchi, wheezing, or rubs  HEART: Regular rate and rhythm; No murmurs, rubs, or gallops  ABDOMEN: Soft, Nontender, Nondistended; Bowel sounds present  EXTREMITIES:  2+ Peripheral Pulses, No clubbing, cyanosis, POSITIVE lymphedema[ POSITIVE hypertrophic toenaiils   LYMPH: 4+ bilateral lym[phedema to knees   SKIN: No rashes or lesions

## 2019-04-08 NOTE — ED ADULT NURSE NOTE - NSIMPLEMENTINTERV_GEN_ALL_ED
Implemented All Universal Safety Interventions:  [place] to call system. Call bell, personal items and telephone within reach. Instruct patient to call for assistance. Room bathroom lighting operational. Non-slip footwear when patient is off stretcher. Physically safe environment: no spills, clutter or unnecessary equipment. Stretcher in lowest position, wheels locked, appropriate side rails in place.

## 2019-04-08 NOTE — CONSULT NOTE ADULT - ASSESSMENT
GLOBAL ISSUE/BEST PRACTICE:      PROBLEM: HOB elevation:   y            PROBLEM: Stress ulcer proph:    na                      PROBLEM: VTE prophylaxis:      Lvnx 40 (4/8)   PROBLEM: Glycemic control:    na  PROBLEM: Nutrition:    dash (4/8)   PROBLEM: Advanced directive: na     PROBLEM: Allergies:  na    MEDICATIONS  PATIENT  BEKA WU 4/8/2019 ADMISSION [facility] VS DR MEGAN ARREOLA        DVT P   Lvnx 40 (4/8)     HYPERTENSION  Lisinopril 10 (4/8)     COPD ex   DUONEB.4 (4/8)   spiriva (4/8)   Solumed 40.4 (4/8)     BRIEF PATIENT  DESCRIPTION  PATIENT  BEKA WU 4/8/2019 ADMISSION [facility] VS DR MEGAN ARREOLA    Pt is a 72 yo lady with a past medical historyx of HTN who presents to the ED with sob. Has been going on for 3 days. Is a current smoker, does not have formal COPD diagnosis, but thinks she has emphysema.       PROBLEM  LIST  BEKA WU 4/8/2019 ADMISSION [facility] VS DR MEGAN ARREOLA    ACUTE COMBINED HYPOXIC HYPERCAPNIC RESP FAILURE POA 4/8/2019 4/8/2019 4p bpap 12.5..3 738/58/81     COPD   On BD steroids     CAD  4/8/2019 Tr 1 n     POSSIBLE SYSTOLIC CHR  CHF   4/8/2019 bnp 486            ASSESSMENT RECOMMENDATIONS PATIENT     BEKA WU 4/8/2019 ADMISSION [facility] VS DR MEGAN ARREOLA       ACUTE COMBINED HYPOXIC HYPERCAPNIC RESP FAILURE POA 4/8/2019 4/8/2019 4p bpap 12.5..3 738/58/81   Monitor po abg Target po 90-95%    COPD   4/8/2019 Plan taper steroids 4/9    CAD  4/8/2019 Tr 1 n   Monitor enz    POSSIBLE SYSTOLIC CHR  CHF   4/8/2019 bnp 486   4/8/2019 Check echo           TIME SPENT Over 55 minutes aggregate care time spent on encounter; activities included   direct patient care, counseling and/or coordinating care reviewing notes, lab data/ imaging , discussion with multidisciplinary team/ patient  /family. Risks, benefits, alternatives  discussed in detail.

## 2019-04-08 NOTE — H&P ADULT - ASSESSMENT
73f with history of emphysema  with acute trespiratory failure       IMPROVE VTE Individual Risk Assessment        RISK                                                          Points  [  ] Previous VTE                                                3  [  ] Thrombophilia                                             2  [  ] Lower limb paralysis                                   2        (unable to hold up >15 seconds)    [  ] Current Cancer                                            2         (within 6 months)  [ x ] Immobilization > 24 hrs                              1  [  ] ICU/CCU stay > 24 hours                            1  [ x ] Age > 60                                                    1  IMPROVE VTE Score ________2_

## 2019-04-08 NOTE — CONSULT NOTE ADULT - SUBJECTIVE AND OBJECTIVE BOX
JAZMIN IRELAND [facility]  [phone]   ALLERGY nka   CONTACT Child Wendy Raymundo  self    Initial evaluation/Pulmonary Critical Care consultation requested on  4/8/2019  by Dr Duran  from Dr Foster   Patient examined chart reviewed    HOSPITAL ADMISSION 4/8/2019 [facility] VS DR MEGAN DURAN     PATIENT CAME  FROM (if information available)        TYPE OF VISIT       Initial evaluation/Pulmonary Critical Care consultation requested on  4/8/2019  by Dr Duran  from Dr Foster       REASON FOR VISIT  Please see problem list            PATIENT DESCRIPTION PATIENT  BEKA WU 4/8/2019 ADMISSION [facility] VS DR MEGAN DURAN             History of Present Illness:  Reason for Admission: short of breath  History of Present Illness:    Pt is a 74 yo lady with a past medical historyx of HTN who presents to the ED with sob. Has been going on for 3 days. Is a current smoker, does not have formal COPD diagnosis, but thinks she has emphysema. No chest pain, no fevers, no abdominal pain, no n/v/d. No dysuria.- she feels that she got sick from her sick sister - patient coughs with no sputum production            Review of Systems:  Review of Systems: CONSTITUTIONAL: No fever, weight loss, or fatigue  EYES: No eye pain, visual disturbances, or discharge  ENMT:  No difficulty hearing, tinnitus, vertigo; No sinus or throat pain  NECK: No pain or stiffness  RESPIRATORY: No cough, wheezing, chills or hemoptysis; No shortness of breath  CARDIOVASCULAR: No chest pain, palpitations, dizziness, or leg swelling  GASTROINTESTINAL: No abdominal or epigastric pain. No nausea, vomiting, or hematemesis; No diarrhea or constipation. No melena or hematochezia.  GENITOURINARY: No dysuria, frequency, hematuria, or incontinence  NEUROLOGICAL: No headaches, memory loss, loss of strength, numbness, or tremors  SKIN: No itching, burning, rashes, or lesions   LYMPH NODES: No enlarged glands  ENDOCRINE: No heat or cold intolerance; No hair loss  MUSCULOSKELETAL: No joint pain or swelling; No muscle, back, or extremity pain  PSYCHIATRIC: No depression, anxiety, mood swings, or difficulty sleeping  HEME/LYMPH: No easy bruising, or bleeding gums  ALLERGY AND IMMUNOLOGIC: No hives or eczema      Allergies and Intolerances:        Allergies:  No Known Allergies:     Home Medications:   * Outpatient Medication Status not yet specified    .    Patient History:    Past Medical, Past Surgical, and Family History:  PAST MEDICAL HISTORY:  HTN (hypertension).     Tobacco Screening:  · Core Measure Site Yes  · Has the patient used tobacco in the past 30 days? Yes    · Tobacco Cessation Education/Counseling Offered and provided   · Tobacco Cessation Medication Offered and patient declined     Risk Assessment:    Present on Admission:  Deep Venous Thrombosis no  Pulmonary Embolus no  Urinary Catheter no  Central Venous Catheter/PICC Line no  Surgical Site Incision no  Pressure Ulcer(s) no     Heart Failure:  Does this patient have a history of or has been diagnosed with heart failure? no.       Physical Exam:  Physical Exam: ICU Vital Signs Last 24 Hrs  T(C): 36.6 (08 Apr 2019 15:10), Max: 36.6 (08 Apr 2019 15:10)  T(F): 97.9 (08 Apr 2019 15:10), Max: 97.9 (08 Apr 2019 15:10)  HR: 92 (08 Apr 2019 15:42) (77 - 92)  BP: 158/75 (08 Apr 2019 15:34) (158/75 - 186/82)  BP(mean): --  ABP: --  ABP(mean): --  RR: 25 (08 Apr 2019 15:34) (25 - 32)  SpO2: 95% (08 Apr 2019 15:42) (91% - 95%)  GENERAL: NAD well-developed  HEAD:  Atraumatic, Normocephalic  EYES: EOMI, PERRLA, conjunctiva and sclera clear  ENMT: No tonsillar erythema, exudates, or enlargement; Moist mucous membranes, Good dentition, No lesions  NECK: Supple, No JVD, Normal thyroid  NERVOUS SYSTEM:  Alert & Oriented X3, Good concentration; Motor Strength 5/5 B/L upper and lower extremities; DTRs 2+ intact and symmetric  CHEST/LUNG: Clear to percussion bilaterally; No rales, rhonchi, wheezing, or rubs  HEART: Regular rate and rhythm; No murmurs, rubs, or gallops  ABDOMEN: Soft, Nontender, Nondistended; Bowel sounds present  EXTREMITIES:  2+ Peripheral Pulses, No clubbing, cyanosis, POSITIVE lymphedema  LYMPH: 4+ bilateral lym[phedema to knees   SKIN: No rashes or lesions         VITALS/LABS PATIENT    BEKA WU 4/8/2019 ADMISSION [facility] VS DR MEGAN DURAN   4/8/2019 afeb 77 158/75 25 99%   4/8/2019 W 6.3 Hb 14.9 Plt 239  Na 141 K 3.7 CO2 35 Cr .7     4/8/2019 Tr 1 n      4/8/2019 bnp 486     4/8/2019 4p bpap 12.5..3 738/58/81     REVIEW OF SYMPTOMS    Able to give ROS  Yes     RELIABLE No   CONSTITUTIONAL Weakness Yes  Chills No Vision changes No  ENDOCRINE No unexplained hair loss No heat or cold intolerance    ALLERGY No hives  Sore throat No   RESP Coughing blood no  Shortness of breath YES   NEURO No Headache  Confusion Pain neck No   CARDIAC No Chest pain No Palpitations   GI No Pain abdomen NO   Vomiting NO     PHYSICAL EXAM    HEENT Unremarkable PERRLA atraumatic   RESP Fair air entry EXP prolonged    Harsh breath sound Resp distres mild   CARDIAC S1 S2 No S3     NO JVD    ABDOMEN SOFT BS PRESENT NOT DISTENDED No hepatosplenomegaly PEDAL EDEMA present No calf tenderness  NO rash   GENERAL Not TOXIC looking

## 2019-04-09 DIAGNOSIS — M79.674 PAIN IN RIGHT TOE(S): ICD-10-CM

## 2019-04-09 DIAGNOSIS — B35.1 TINEA UNGUIUM: ICD-10-CM

## 2019-04-09 DIAGNOSIS — R60.0 LOCALIZED EDEMA: ICD-10-CM

## 2019-04-09 DIAGNOSIS — I89.0 LYMPHEDEMA, NOT ELSEWHERE CLASSIFIED: ICD-10-CM

## 2019-04-09 LAB
ANION GAP SERPL CALC-SCNC: 9 MMOL/L — SIGNIFICANT CHANGE UP (ref 5–17)
APPEARANCE UR: CLEAR — SIGNIFICANT CHANGE UP
BACTERIA # UR AUTO: ABNORMAL
BILIRUB UR-MCNC: NEGATIVE — SIGNIFICANT CHANGE UP
BUN SERPL-MCNC: 14 MG/DL — SIGNIFICANT CHANGE UP (ref 7–23)
CALCIUM SERPL-MCNC: 8.9 MG/DL — SIGNIFICANT CHANGE UP (ref 8.5–10.1)
CHLORIDE SERPL-SCNC: 100 MMOL/L — SIGNIFICANT CHANGE UP (ref 96–108)
CO2 SERPL-SCNC: 30 MMOL/L — SIGNIFICANT CHANGE UP (ref 22–31)
COLOR SPEC: YELLOW — SIGNIFICANT CHANGE UP
CREAT SERPL-MCNC: 0.7 MG/DL — SIGNIFICANT CHANGE UP (ref 0.5–1.3)
DIFF PNL FLD: ABNORMAL
EPI CELLS # UR: SIGNIFICANT CHANGE UP
GLUCOSE SERPL-MCNC: 137 MG/DL — HIGH (ref 70–99)
GLUCOSE UR QL: NEGATIVE MG/DL — SIGNIFICANT CHANGE UP
HCT VFR BLD CALC: 44.5 % — SIGNIFICANT CHANGE UP (ref 34.5–45)
HCV AB S/CO SERPL IA: 0.1 S/CO — SIGNIFICANT CHANGE UP (ref 0–0.99)
HCV AB SERPL-IMP: SIGNIFICANT CHANGE UP
HGB BLD-MCNC: 14 G/DL — SIGNIFICANT CHANGE UP (ref 11.5–15.5)
KETONES UR-MCNC: ABNORMAL
LEUKOCYTE ESTERASE UR-ACNC: NEGATIVE — SIGNIFICANT CHANGE UP
MCHC RBC-ENTMCNC: 27.2 PG — SIGNIFICANT CHANGE UP (ref 27–34)
MCHC RBC-ENTMCNC: 31.5 GM/DL — LOW (ref 32–36)
MCV RBC AUTO: 86.6 FL — SIGNIFICANT CHANGE UP (ref 80–100)
NITRITE UR-MCNC: NEGATIVE — SIGNIFICANT CHANGE UP
NRBC # BLD: 0 /100 WBCS — SIGNIFICANT CHANGE UP (ref 0–0)
PH UR: 5 — SIGNIFICANT CHANGE UP (ref 5–8)
PLATELET # BLD AUTO: 176 K/UL — SIGNIFICANT CHANGE UP (ref 150–400)
POTASSIUM SERPL-MCNC: 3.7 MMOL/L — SIGNIFICANT CHANGE UP (ref 3.5–5.3)
POTASSIUM SERPL-SCNC: 3.7 MMOL/L — SIGNIFICANT CHANGE UP (ref 3.5–5.3)
PROCALCITONIN SERPL-MCNC: 0.04 NG/ML — SIGNIFICANT CHANGE UP (ref 0.02–0.1)
PROT UR-MCNC: 30 MG/DL
RBC # BLD: 5.14 M/UL — SIGNIFICANT CHANGE UP (ref 3.8–5.2)
RBC # FLD: 13.8 % — SIGNIFICANT CHANGE UP (ref 10.3–14.5)
RBC CASTS # UR COMP ASSIST: SIGNIFICANT CHANGE UP /HPF (ref 0–4)
SODIUM SERPL-SCNC: 139 MMOL/L — SIGNIFICANT CHANGE UP (ref 135–145)
SP GR SPEC: 1.02 — SIGNIFICANT CHANGE UP (ref 1.01–1.02)
UROBILINOGEN FLD QL: NEGATIVE MG/DL — SIGNIFICANT CHANGE UP
WBC # BLD: 4.87 K/UL — SIGNIFICANT CHANGE UP (ref 3.8–10.5)
WBC # FLD AUTO: 4.87 K/UL — SIGNIFICANT CHANGE UP (ref 3.8–10.5)
WBC UR QL: SIGNIFICANT CHANGE UP

## 2019-04-09 PROCEDURE — 99233 SBSQ HOSP IP/OBS HIGH 50: CPT

## 2019-04-09 PROCEDURE — 93970 EXTREMITY STUDY: CPT | Mod: 26

## 2019-04-09 RX ORDER — IBUPROFEN 200 MG
400 TABLET ORAL ONCE
Qty: 0 | Refills: 0 | Status: COMPLETED | OUTPATIENT
Start: 2019-04-09 | End: 2019-04-09

## 2019-04-09 RX ORDER — IBUPROFEN 200 MG
400 TABLET ORAL EVERY 6 HOURS
Qty: 0 | Refills: 0 | Status: DISCONTINUED | OUTPATIENT
Start: 2019-04-09 | End: 2019-04-16

## 2019-04-09 RX ADMIN — Medication 40 MILLIGRAM(S): at 13:33

## 2019-04-09 RX ADMIN — Medication 40 MILLIGRAM(S): at 23:58

## 2019-04-09 RX ADMIN — Medication 40 MILLIGRAM(S): at 05:10

## 2019-04-09 RX ADMIN — Medication 3 MILLILITER(S): at 05:22

## 2019-04-09 RX ADMIN — LISINOPRIL 10 MILLIGRAM(S): 2.5 TABLET ORAL at 05:10

## 2019-04-09 RX ADMIN — Medication 400 MILLIGRAM(S): at 08:55

## 2019-04-09 RX ADMIN — Medication 400 MILLIGRAM(S): at 07:54

## 2019-04-09 RX ADMIN — Medication 400 MILLIGRAM(S): at 15:36

## 2019-04-09 RX ADMIN — Medication 40 MILLIGRAM(S): at 17:19

## 2019-04-09 RX ADMIN — Medication 3 MILLILITER(S): at 11:32

## 2019-04-09 RX ADMIN — AZITHROMYCIN 255 MILLIGRAM(S): 500 TABLET, FILM COATED ORAL at 18:05

## 2019-04-09 RX ADMIN — Medication 3 MILLILITER(S): at 01:03

## 2019-04-09 RX ADMIN — ENOXAPARIN SODIUM 40 MILLIGRAM(S): 100 INJECTION SUBCUTANEOUS at 23:57

## 2019-04-09 RX ADMIN — Medication 3 MILLILITER(S): at 23:23

## 2019-04-09 RX ADMIN — Medication 3 MILLILITER(S): at 17:19

## 2019-04-09 RX ADMIN — Medication 400 MILLIGRAM(S): at 16:47

## 2019-04-09 NOTE — PROGRESS NOTE ADULT - SUBJECTIVE AND OBJECTIVE BOX
Patient was examined Chart reviewed Detailed note will be inserted below after going over latest data Meanwhile please refer to my previous notes for Pulmonary/Critical Care assessment recommendations   dw pt and sister bedside Patient was examined Chart reviewed Detailed note will be inserted below after going over latest data Meanwhile please refer to my previous notes for Pulmonary/Critical Care assessment recommendations   dw pt and sister bedside    JAZMIN IRELAND  [phone]   ALLERGY nka   CONTACT Child Wendy Raymundo  self    Initial evaluation/Pulmonary Critical Care consultation requested on  4/8/2019  by Dr Duran  from Dr Foster   Patient examined chart reviewed    HOSPITAL ADMISSION 4/8/2019 [facility] VS DR MEGAN DURAN     PATIENT CAME  FROM (if information available)        TYPE OF VISIT      subsequent pulm followup       REASON FOR VISIT  Please see problem list                 VITALS/LABS PATIENT    BEKA WU 4/8/2019 ADMISSION [facility] VS DR MEGAN DURAN    4/9/2019 afeb 92 150/80 20 92%   4/9/2019 W 4.8 Hb 14 Plt 176 Na 139 K 3.7 CO2 30 Cr .7   4/9/2019 V duplex n   4/9/2019 pc n     REVIEW OF SYMPTOMS    Able to give ROS  Yes     RELIABLE No   CONSTITUTIONAL Weakness Yes  Chills No Vision changes No  ENDOCRINE No unexplained hair loss No heat or cold intolerance    ALLERGY No hives  Sore throat No   RESP Coughing blood no  Shortness of breath YES   NEURO No Headache  Confusion Pain neck No   CARDIAC No Chest pain No Palpitations   GI No Pain abdomen NO   Vomiting NO     PHYSICAL EXAM    HEENT Unremarkable PERRLA atraumatic   RESP Fair air entry EXP prolonged    Harsh breath sound Resp distres mild   CARDIAC S1 S2 No S3     NO JVD    ABDOMEN SOFT BS PRESENT NOT DISTENDED No hepatosplenomegaly PEDAL EDEMA present No calf tenderness  NO rash   GENERAL Not TOXIC looking

## 2019-04-09 NOTE — PROGRESS NOTE ADULT - SUBJECTIVE AND OBJECTIVE BOX
Patient is a 73y old  Female who presents with a chief complaint of short of breath (2019 18:50)      INTERVAL HPI/OVERNIGHT EVENTS: wheezing slightly improved. denies resp distress, sputum, n/v/d    MEDICATIONS  (STANDING):  ALBUTerol    90 MICROgram(s) HFA Inhaler 1 Puff(s) Inhalation every 4 hours  ALBUTerol/ipratropium for Nebulization 3 milliLiter(s) Nebulizer every 6 hours  azithromycin  IVPB 500 milliGRAM(s) IV Intermittent every 24 hours  azithromycin  IVPB      enoxaparin Injectable 40 milliGRAM(s) SubCutaneous every 24 hours  lisinopril 10 milliGRAM(s) Oral daily  methylPREDNISolone sodium succinate Injectable 40 milliGRAM(s) IV Push every 6 hours  tiotropium 18 MICROgram(s) Capsule 1 Capsule(s) Inhalation daily    MEDICATIONS  (PRN):      Allergies    No Known Allergies    Intolerances          Vital Signs Last 24 Hrs  T(C): 36.5 (2019 05:49), Max: 37.1 (2019 00:06)  T(F): 97.7 (2019 05:49), Max: 98.7 (2019 00:06)  HR: 81 (2019 11:32) (77 - 96)  BP: 139/63 (2019 05:49) (139/63 - 186/82)  BP(mean): --  RR: 18 (2019 05:49) (18 - 32)  SpO2: 95% (2019 11:32) (91% - 98%)    PHYSICAL EXAM:  GENERAL: NAD, well-groomed, well-developed  HEAD:  Atraumatic, Normocephalic  EYES: EOMI, PERRLA, conjunctiva and sclera clear  ENMT: No tonsillar erythema, exudates, or enlargement; Moist mucous membranes, Good dentition, No lesions  NECK: Supple, No JVD, Normal thyroid  NERVOUS SYSTEM:  Alert & Oriented X3, Good concentration; Motor Strength 5/5 B/L upper and lower extremities; DTRs 2+ intact and symmetric  CHEST/LUNG: wheezing b/l. no distress or use of accessory muscles   HEART: Regular rate and rhythm; No murmurs, rubs, or gallops  ABDOMEN: Soft, Nontender, Nondistended; Bowel sounds present  EXTREMITIES:  2+ Peripheral Pulses, 3+edema b/l  LYMPH: No lymphadenopathy noted  SKIN: No rashes or lesions    LABS:                        14.0   4.87  )-----------( 176      ( 2019 07:58 )             44.5         139  |  100  |  14  ----------------------------<  137<H>  3.7   |  30  |  0.70    Ca    8.9      2019 07:58    TPro  8.0  /  Alb  3.7  /  TBili  0.7  /  DBili  x   /  AST  27  /  ALT  56  /  AlkPhos  88  04-08    PT/INR - ( 2019 15:08 )   PT: 12.2 sec;   INR: 1.09 ratio         PTT - ( 2019 15:08 )  PTT:28.9 sec  Urinalysis Basic - ( 2019 06:05 )    Color: Yellow / Appearance: Clear / S.020 / pH: x  Gluc: x / Ketone: Moderate  / Bili: Negative / Urobili: Negative mg/dL   Blood: x / Protein: 30 mg/dL / Nitrite: Negative   Leuk Esterase: Negative / RBC: 0-2 /HPF / WBC 0-2   Sq Epi: x / Non Sq Epi: Few / Bacteria: Many      CAPILLARY BLOOD GLUCOSE          RADIOLOGY & ADDITIONAL TESTS:    Imaging Personally Reviewed:  [ ] YES  [ ] NO    Consultant(s) Notes Reviewed:  [ ] YES  [ ] NO    Care Discussed with Consultants/Other Providers [ ] YES  [ ] NO

## 2019-04-09 NOTE — CONSULT NOTE ADULT - SUBJECTIVE AND OBJECTIVE BOX
HPI:  Pt is a 72 yo lady with a past medical historyx of HTN who presents to the ED with sob. Has been going on for 3 days. Is a current smoker, does not have formal COPD diagnosis, but thinks she has emphysema. No chest pain, no fevers, no abdominal pain, no n/v/d. No dysuria.- she feels that she got sick from her sick sister - patient coughs with no sputum production (2019 17:43)  Patient stated that because of her SOB , obesity and lymphedema she was unable to cut her nails. As a result very difficuly to walk      PAST MEDICAL & SURGICAL HISTORY:  HTN (hypertension)      REVIEW OF SYSTEMS:      MEDICATIONS  (STANDING):  ALBUTerol    90 MICROgram(s) HFA Inhaler 1 Puff(s) Inhalation every 4 hours  ALBUTerol/ipratropium for Nebulization 3 milliLiter(s) Nebulizer every 6 hours  azithromycin  IVPB 500 milliGRAM(s) IV Intermittent every 24 hours  azithromycin  IVPB      enoxaparin Injectable 40 milliGRAM(s) SubCutaneous every 24 hours  lisinopril 10 milliGRAM(s) Oral daily  methylPREDNISolone sodium succinate Injectable 40 milliGRAM(s) IV Push every 6 hours  tiotropium 18 MICROgram(s) Capsule 1 Capsule(s) Inhalation daily    MEDICATIONS  (PRN):  ibuprofen  Tablet. 400 milliGRAM(s) Oral every 6 hours PRN Mild Pain (1 - 3)      Allergies    No Known Allergies    Intolerances        SOCIAL HISTORY:Smoking history  Alcohol history  Drug history    FAMILY HISTORY:      Vital Signs Last 24 Hrs  T(C): 36.8 (2019 13:46), Max: 37.1 (2019 00:06)  T(F): 98.2 (2019 13:46), Max: 98.7 (2019 00:06)  HR: 87 (2019 13:46) (77 - 96)  BP: 159/70 (2019 13:46) (139/63 - 159/70)  BP(mean): --  RR: 18 (2019 13:46) (18 - 25)  SpO2: 93% (2019 13:46) (92% - 98%)    PHYSICAL EXAM:    GENERAL:Disheveled morbidley obese with difficulty breathing( o2 cannula in place)  NERVOUS SYSTEM:  Alert & Oriented X3, Good concentration; Motor Strength 2/5 B/L upper and lower extremities;Diminished ssensorium  EXTREMITIES: 1+ Peripheral Pulses, Significant nonpitting edema both legs +5  SKIN: No rashes or lesionsor ulcers  ORTHOPEDIC: foot deformities: rectus  Area of cc: nails long tortuous thick with subungual debri.3mm thickness painful on palpation  Loacl paranychia around each nail  Difficulty walking    LABS:                        14.0   4.87  )-----------( 176      ( 2019 07:58 )             44.5         139  |  100  |  14  ----------------------------<  137<H>  3.7   |  30  |  0.70    Ca    8.9      2019 07:58    TPro  8.0  /  Alb  3.7  /  TBili  0.7  /  DBili  x   /  AST  27  /  ALT  56  /  AlkPhos  88  04-08    PT/INR - ( 2019 15:08 )   PT: 12.2 sec;   INR: 1.09 ratio         PTT - ( 2019 15:08 )  PTT:28.9 sec  Urinalysis Basic - ( 2019 06:05 )    Color: Yellow / Appearance: Clear / S.020 / pH: x  Gluc: x / Ketone: Moderate  / Bili: Negative / Urobili: Negative mg/dL   Blood: x / Protein: 30 mg/dL / Nitrite: Negative   Leuk Esterase: Negative / RBC: 0-2 /HPF / WBC 0-2   Sq Epi: x / Non Sq Epi: Few / Bacteria: Many            RADIOLOGY & ADDITIONAL STUDIES:

## 2019-04-10 LAB
ANION GAP SERPL CALC-SCNC: 6 MMOL/L — SIGNIFICANT CHANGE UP (ref 5–17)
BUN SERPL-MCNC: 30 MG/DL — HIGH (ref 7–23)
CALCIUM SERPL-MCNC: 8.4 MG/DL — LOW (ref 8.5–10.1)
CHLORIDE SERPL-SCNC: 103 MMOL/L — SIGNIFICANT CHANGE UP (ref 96–108)
CO2 SERPL-SCNC: 32 MMOL/L — HIGH (ref 22–31)
CREAT SERPL-MCNC: 1 MG/DL — SIGNIFICANT CHANGE UP (ref 0.5–1.3)
FLU A RESULT: SIGNIFICANT CHANGE UP
FLU A RESULT: SIGNIFICANT CHANGE UP
FLUAV AG NPH QL: SIGNIFICANT CHANGE UP
FLUBV AG NPH QL: SIGNIFICANT CHANGE UP
GLUCOSE SERPL-MCNC: 167 MG/DL — HIGH (ref 70–99)
HCT VFR BLD CALC: 42.2 % — SIGNIFICANT CHANGE UP (ref 34.5–45)
HGB BLD-MCNC: 12.9 G/DL — SIGNIFICANT CHANGE UP (ref 11.5–15.5)
MCHC RBC-ENTMCNC: 26.9 PG — LOW (ref 27–34)
MCHC RBC-ENTMCNC: 30.6 GM/DL — LOW (ref 32–36)
MCV RBC AUTO: 87.9 FL — SIGNIFICANT CHANGE UP (ref 80–100)
NRBC # BLD: 0 /100 WBCS — SIGNIFICANT CHANGE UP (ref 0–0)
PLATELET # BLD AUTO: 242 K/UL — SIGNIFICANT CHANGE UP (ref 150–400)
POTASSIUM SERPL-MCNC: 3.5 MMOL/L — SIGNIFICANT CHANGE UP (ref 3.5–5.3)
POTASSIUM SERPL-SCNC: 3.5 MMOL/L — SIGNIFICANT CHANGE UP (ref 3.5–5.3)
RBC # BLD: 4.8 M/UL — SIGNIFICANT CHANGE UP (ref 3.8–5.2)
RBC # FLD: 14.3 % — SIGNIFICANT CHANGE UP (ref 10.3–14.5)
RSV RESULT: SIGNIFICANT CHANGE UP
RSV RNA RESP QL NAA+PROBE: SIGNIFICANT CHANGE UP
SODIUM SERPL-SCNC: 141 MMOL/L — SIGNIFICANT CHANGE UP (ref 135–145)
WBC # BLD: 11.5 K/UL — HIGH (ref 3.8–10.5)
WBC # FLD AUTO: 11.5 K/UL — HIGH (ref 3.8–10.5)

## 2019-04-10 PROCEDURE — 99239 HOSP IP/OBS DSCHRG MGMT >30: CPT

## 2019-04-10 RX ADMIN — Medication 400 MILLIGRAM(S): at 22:55

## 2019-04-10 RX ADMIN — LISINOPRIL 10 MILLIGRAM(S): 2.5 TABLET ORAL at 06:11

## 2019-04-10 RX ADMIN — ENOXAPARIN SODIUM 40 MILLIGRAM(S): 100 INJECTION SUBCUTANEOUS at 23:09

## 2019-04-10 RX ADMIN — Medication 3 MILLILITER(S): at 18:04

## 2019-04-10 RX ADMIN — Medication 400 MILLIGRAM(S): at 00:25

## 2019-04-10 RX ADMIN — AZITHROMYCIN 255 MILLIGRAM(S): 500 TABLET, FILM COATED ORAL at 17:51

## 2019-04-10 RX ADMIN — Medication 400 MILLIGRAM(S): at 21:55

## 2019-04-10 RX ADMIN — Medication 3 MILLILITER(S): at 11:23

## 2019-04-10 RX ADMIN — Medication 40 MILLIGRAM(S): at 21:46

## 2019-04-10 RX ADMIN — Medication 3 MILLILITER(S): at 23:31

## 2019-04-10 RX ADMIN — Medication 400 MILLIGRAM(S): at 01:05

## 2019-04-10 RX ADMIN — Medication 3 MILLILITER(S): at 05:41

## 2019-04-10 NOTE — DIETITIAN INITIAL EVALUATION ADULT. - NS AS NUTRI INTERV ED CONTENT
Purpose of the nutrition education/Priority modifications/Survival information/Recommended modifications/Provided diet instruction & handout material on wt loss tips

## 2019-04-10 NOTE — DIETITIAN INITIAL EVALUATION ADULT. - ORAL INTAKE PTA
good/Pt states she skips breakfast & lunch. Dinner; 2 x portion fish, chicken, Steak (baked & fried) vegetables, rice or pasta or potato good/Pt states she skips breakfast & lunch. Dinner; 2 x portion fish, chicken, Steak (baked & fried) vegetables, rice or pasta or potato, soda & whole milk, part skimmed cheese

## 2019-04-10 NOTE — DIETITIAN INITIAL EVALUATION ADULT. - PERTINENT LABORATORY DATA
04-10 Na 139   Glu 137   K+ 3.7   Cr 0.70   BUN 14   Phos n/a   Alb 3.7(4/8)   PAB n/a   Hgb 12.9 g/dL Hct 42.2 % HgA1C n/a     24Hr FS:, VK=[id number]

## 2019-04-10 NOTE — DIETITIAN INITIAL EVALUATION ADULT. - PERTINENT MEDS FT
ALBUTerol    90 MICROgram(s) HFA Inhaler  ALBUTerol/ipratropium for Nebulization  azithromycin  IVPB  azithromycin  IVPB  enoxaparin Injectable  ibuprofen  Tablet. PRN  lisinopril  methylPREDNISolone sodium succinate Injectable  tiotropium 18 MICROgram(s) Capsule

## 2019-04-10 NOTE — CHART NOTE - NSCHARTNOTEFT_GEN_A_CORE
Upon Nutritional Assessment by the Registered Dietitian your patient was determined to meet criteria / has evidence of the following diagnosis/diagnoses:          [ ]  Mild Protein Calorie Malnutrition        [ ]  Moderate Protein Calorie Malnutrition        [ ] Severe Protein Calorie Malnutrition        [ ] Unspecified Protein Calorie Malnutrition        [ ] Underweight / BMI <19        [x ] Morbid Obesity / BMI > 40      Findings as based on:  •  Comprehensive nutrition assessment and consultation  •  Calorie counts (nutrient intake analysis)  •  Food acceptance and intake status from observations by staff  •  Follow up  •  Patient education  •  Intervention secondary to interdisciplinary rounds  •   concerns      Treatment:    The following diet has been recommended:  Continue Dash/TLC    PROVIDER Section:     By signing this assessment you are acknowledging and agree with the diagnosis/diagnoses assigned by the Registered Dietitian    Comments:

## 2019-04-10 NOTE — PHYSICAL THERAPY INITIAL EVALUATION ADULT - GAIT TRAINING, PT EVAL
In 4 weeks, patient will demonstrate safe gait in and outdoors c use of appropriate mobility device with improved falls risk on Tinetti DIXON.

## 2019-04-10 NOTE — DIETITIAN INITIAL EVALUATION ADULT. - OTHER INFO
Pt seen for RN consult 4/8, BMI=62. 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 Pt with poor eating habits & excessive energy intake in evening. Observed pt @ breakfast & consuming 100% & tolerated well. Pt reports last BM x 1(4/10). Pt with SOB currently on steroids.

## 2019-04-10 NOTE — PROGRESS NOTE ADULT - SUBJECTIVE AND OBJECTIVE BOX
Patient is a 73y old  Female who presents with a chief complaint of short of breath (2019 18:50)      INTERVAL HPI/OVERNIGHT EVENTS: none     MEDICATIONS  (STANDING):  ALBUTerol    90 MICROgram(s) HFA Inhaler 1 Puff(s) Inhalation every 4 hours  ALBUTerol/ipratropium for Nebulization 3 milliLiter(s) Nebulizer every 6 hours  azithromycin  IVPB 500 milliGRAM(s) IV Intermittent every 24 hours  azithromycin  IVPB      enoxaparin Injectable 40 milliGRAM(s) SubCutaneous every 24 hours  lisinopril 10 milliGRAM(s) Oral daily  methylPREDNISolone sodium succinate Injectable 40 milliGRAM(s) IV Push daily  tiotropium 18 MICROgram(s) Capsule 1 Capsule(s) Inhalation daily    MEDICATIONS  (PRN):  ibuprofen  Tablet. 400 milliGRAM(s) Oral every 6 hours PRN Mild Pain (1 - 3)        Allergies    No Known Allergies    Intolerances      Vital Signs Last 24 Hrs  T(C): 35.8 (10 Apr 2019 05:22), Max: 37.1 (10 Apr 2019 00:05)  T(F): 96.4 (10 Apr 2019 05:22), Max: 98.8 (10 Apr 2019 00:05)  HR: 83 (10 Apr 2019 09:20) (75 - 97)  BP: 122/67 (10 Apr 2019 05:22) (118/55 - 159/70)  BP(mean): --  RR: 18 (10 Apr 2019 05:22) (18 - 20)  SpO2: 95% (10 Apr 2019 09:20) (92% - 98%)    PHYSICAL EXAM:  GENERAL: NAD, well-groomed, well-developed  HEAD:  Atraumatic, Normocephalic  EYES: EOMI, PERRLA, conjunctiva and sclera clear  ENMT: No tonsillar erythema, exudates, or enlargement; Moist mucous membranes, Good dentition, No lesions  NECK: Supple, No JVD, Normal thyroid  NERVOUS SYSTEM:  Alert & Oriented X3, Good concentration; Motor Strength 5/5 B/L upper and lower extremities; DTRs 2+ intact and symmetric  CHEST/LUNG: wheezing b/l. no distress or use of accessory muscles   HEART: Regular rate and rhythm; No murmurs, rubs, or gallops  ABDOMEN: Soft, Nontender, Nondistended; Bowel sounds present  EXTREMITIES:  2+ Peripheral Pulses, 3+edema b/l  LYMPH: No lymphadenopathy noted  SKIN: No rashes or lesions    LABS:                                              12.9   11.50 )-----------( 242      ( 10 Apr 2019 08:27 )             42.2     04-10    141  |  103  |  30<H>  ----------------------------<  167<H>  3.5   |  32<H>  |  1.00    Ca    8.4<L>      10 Apr 2019 08:27    TPro  8.0  /  Alb  3.7  /  TBili  0.7  /  DBili  x   /  AST  27  /  ALT  56  /  AlkPhos  88  04-08    PT/INR - ( 2019 15:08 )   PT: 12.2 sec;   INR: 1.09 ratio         PTT - ( 2019 15:08 )  PTT:28.9 sec  Urinalysis Basic - ( 2019 06:05 )    Color: Yellow / Appearance: Clear / S.020 / pH: x  Gluc: x / Ketone: Moderate  / Bili: Negative / Urobili: Negative mg/dL   Blood: x / Protein: 30 mg/dL / Nitrite: Negative   Leuk Esterase: Negative / RBC: 0-2 /HPF / WBC 0-2   Sq Epi: x / Non Sq Epi: Few / Bacteria: Many      Color: Yellow / Appearance: Clear / S.020 / pH: x  Gluc: x / Ketone: Moderate  / Bili: Negative / Urobili: Negative mg/dL   Blood: x / Protein: 30 mg/dL / Nitrite: Negative   Leuk Esterase: Negative / RBC: 0-2 /HPF / WBC 0-2   Sq Epi: x / Non Sq Epi: Few / Bacteria: Many      CAPILLARY BLOOD GLUCOSE          RADIOLOGY & ADDITIONAL TESTS:    Imaging Personally Reviewed:  [ ] YES  [ ] NO    Consultant(s) Notes Reviewed:  [ ] YES  [ ] NO    Care Discussed with Consultants/Other Providers [ ] YES  [ ] NO

## 2019-04-10 NOTE — PROGRESS NOTE ADULT - SUBJECTIVE AND OBJECTIVE BOX
Patient was examined Chart reviewed Detailed note will be inserted below after going over latest data Meanwhile please refer to my previous notes for Pulmonary/Critical Care assessment recommendations   Doing better

## 2019-04-10 NOTE — PHYSICAL THERAPY INITIAL EVALUATION ADULT - BALANCE TRAINING, PT EVAL
GOAL: Patient will demonstrate independent performance of ADLS c low falls risk with appropriate mobility/adaptive devices, with adherence to orthopedic precautions, in 4 weeks.

## 2019-04-11 LAB — RAPID RVP RESULT: SIGNIFICANT CHANGE UP

## 2019-04-11 PROCEDURE — 99233 SBSQ HOSP IP/OBS HIGH 50: CPT

## 2019-04-11 RX ORDER — FUROSEMIDE 40 MG
40 TABLET ORAL DAILY
Qty: 0 | Refills: 0 | Status: DISCONTINUED | OUTPATIENT
Start: 2019-04-11 | End: 2019-04-12

## 2019-04-11 RX ORDER — BUDESONIDE AND FORMOTEROL FUMARATE DIHYDRATE 160; 4.5 UG/1; UG/1
2 AEROSOL RESPIRATORY (INHALATION)
Qty: 0 | Refills: 0 | Status: DISCONTINUED | OUTPATIENT
Start: 2019-04-11 | End: 2019-04-16

## 2019-04-11 RX ORDER — AZITHROMYCIN 500 MG/1
500 TABLET, FILM COATED ORAL ONCE
Qty: 0 | Refills: 0 | Status: COMPLETED | OUTPATIENT
Start: 2019-04-11 | End: 2019-04-11

## 2019-04-11 RX ADMIN — Medication 40 MILLIGRAM(S): at 16:48

## 2019-04-11 RX ADMIN — BUDESONIDE AND FORMOTEROL FUMARATE DIHYDRATE 2 PUFF(S): 160; 4.5 AEROSOL RESPIRATORY (INHALATION) at 20:03

## 2019-04-11 RX ADMIN — Medication 40 MILLIGRAM(S): at 23:13

## 2019-04-11 RX ADMIN — ENOXAPARIN SODIUM 40 MILLIGRAM(S): 100 INJECTION SUBCUTANEOUS at 23:13

## 2019-04-11 RX ADMIN — Medication 3 MILLILITER(S): at 17:14

## 2019-04-11 RX ADMIN — AZITHROMYCIN 500 MILLIGRAM(S): 500 TABLET, FILM COATED ORAL at 23:13

## 2019-04-11 RX ADMIN — LISINOPRIL 10 MILLIGRAM(S): 2.5 TABLET ORAL at 05:41

## 2019-04-11 RX ADMIN — Medication 3 MILLILITER(S): at 11:18

## 2019-04-11 RX ADMIN — Medication 3 MILLILITER(S): at 05:37

## 2019-04-11 NOTE — PROGRESS NOTE ADULT - SUBJECTIVE AND OBJECTIVE BOX
Patient is a 73y old  Female who presents with a chief complaint of short of breath (2019 18:50)      INTERVAL HPI/OVERNIGHT EVENTS: none     MEDICATIONS  (STANDING):  ALBUTerol    90 MICROgram(s) HFA Inhaler 1 Puff(s) Inhalation every 4 hours  ALBUTerol/ipratropium for Nebulization 3 milliLiter(s) Nebulizer every 6 hours  azithromycin  IVPB 500 milliGRAM(s) IV Intermittent every 24 hours  azithromycin  IVPB      enoxaparin Injectable 40 milliGRAM(s) SubCutaneous every 24 hours  lisinopril 10 milliGRAM(s) Oral daily  methylPREDNISolone sodium succinate Injectable 40 milliGRAM(s) IV Push daily  tiotropium 18 MICROgram(s) Capsule 1 Capsule(s) Inhalation daily    MEDICATIONS  (PRN):  ibuprofen  Tablet. 400 milliGRAM(s) Oral every 6 hours PRN Mild Pain (1 - 3)        Allergies    No Known Allergies    Intolerances    Vital Signs Last 24 Hrs  T(C): 37.2 (2019 05:12), Max: 37.2 (2019 05:12)  T(F): 98.9 (2019 05:12), Max: 98.9 (2019 05:12)  HR: 80 (2019 11:32) (74 - 97)  BP: 122/62 (2019 05:12) (113/70 - 134/68)  BP(mean): --  RR: 17 (2019 05:12) (17 - 22)  SpO2: 98% (2019 11:32) (94% - 100%)    PHYSICAL EXAM:  GENERAL: NAD, well-groomed, well-developed  HEAD:  Atraumatic, Normocephalic  EYES: EOMI, PERRLA, conjunctiva and sclera clear  ENMT: No tonsillar erythema, exudates, or enlargement; Moist mucous membranes, Good dentition, No lesions  NECK: Supple, No JVD, Normal thyroid  NERVOUS SYSTEM:  Alert & Oriented X3, Good concentration; Motor Strength 5/5 B/L upper and lower extremities; DTRs 2+ intact and symmetric  CHEST/LUNG: wheezing b/l. no distress or use of accessory muscles   HEART: Regular rate and rhythm; No murmurs, rubs, or gallops  ABDOMEN: Soft, Nontender, Nondistended; Bowel sounds present  EXTREMITIES:  2+ Peripheral Pulses, 3+edema b/l  LYMPH: No lymphadenopathy noted  SKIN: No rashes or lesions    LABS:                                              12.9   11.50 )-----------( 242      ( 10 Apr 2019 08:27 )             42.2     04-10    141  |  103  |  30<H>  ----------------------------<  167<H>  3.5   |  32<H>  |  1.00    Ca    8.4<L>      10 Apr 2019 08:27    TPro  8.0  /  Alb  3.7  /  TBili  0.7  /  DBili  x   /  AST  27  /  ALT  56  /  AlkPhos  88  04-08    PT/INR - ( 2019 15:08 )   PT: 12.2 sec;   INR: 1.09 ratio         PTT - ( 2019 15:08 )  PTT:28.9 sec  Urinalysis Basic - ( 2019 06:05 )    Color: Yellow / Appearance: Clear / S.020 / pH: x  Gluc: x / Ketone: Moderate  / Bili: Negative / Urobili: Negative mg/dL   Blood: x / Protein: 30 mg/dL / Nitrite: Negative   Leuk Esterase: Negative / RBC: 0-2 /HPF / WBC 0-2   Sq Epi: x / Non Sq Epi: Few / Bacteria: Many      Color: Yellow / Appearance: Clear / S.020 / pH: x  Gluc: x / Ketone: Moderate  / Bili: Negative / Urobili: Negative mg/dL   Blood: x / Protein: 30 mg/dL / Nitrite: Negative   Leuk Esterase: Negative / RBC: 0-2 /HPF / WBC 0-2   Sq Epi: x / Non Sq Epi: Few / Bacteria: Many      CAPILLARY BLOOD GLUCOSE          RADIOLOGY & ADDITIONAL TESTS:    Imaging Personally Reviewed:  [ ] YES  [ ] NO    Consultant(s) Notes Reviewed:  [ ] YES  [ ] NO    Care Discussed with Consultants/Other Providers [ ] YES  [ ] NO

## 2019-04-11 NOTE — PROGRESS NOTE ADULT - SUBJECTIVE AND OBJECTIVE BOX
Chart reviewed Detailed note will be inserted below after going over latest data Meanwhile please refer to my previous notes for Pulmonary/Critical Care assessment recommendations Chart reviewed Detailed note will be inserted below after going over latest data Meanwhile please refer to my previous notes for Pulmonary/Critical Care assessment recommendations    JAZMIN IRELAND  [phone]   ALLERGY nka   CONTACT Child Wendy Raymundo  self    Initial evaluation/Pulmonary Critical Care consultation requested on  4/8/2019  by Dr Duran  from Dr Foster   Patient examined chart reviewed    HOSPITAL ADMISSION 4/8/2019 [facility] VS DR MEGAN DUR[facility]     PATIENT CAME  FROM (if information available)        TYPE OF VISIT      subsequent pulm followup       REASON FOR VISIT  Please see problem list                 VITALS/LABS PATIENT    BEKA WU 4/8/2019 ADMISSION [facility] VS DR MEGAN DURAN    4/11/2019 afeb 91 120/60 17 99%   4/10/2019 afeb 92 113/70 22 94%   4/10/2019 W 11.5 Hb 12.9 Plt 242 Na 141 K 3.5 CO2 32 Cr 1    REVIEW OF SYMPTOMS    Able to give ROS  Yes     RELIABLE No   CONSTITUTIONAL Weakness Yes  Chills No Vision changes No  ENDOCRINE No unexplained hair loss No heat or cold intolerance    ALLERGY No hives  Sore throat No   RESP Coughing blood no  Shortness of breath YES   NEURO No Headache  Confusion Pain neck No   CARDIAC No Chest pain No Palpitations   GI No Pain abdomen NO   Vomiting NO     PHYSICAL EXAM    HEENT Unremarkable PERRLA atraumatic   RESP Fair air entry EXP prolonged    Harsh breath sound Resp distres mild   CARDIAC S1 S2 No S3     NO JVD    ABDOMEN SOFT BS PRESENT NOT DISTENDED No hepatosplenomegaly PEDAL EDEMA present No calf tenderness  NO rash   GENERAL Not TOXIC looking

## 2019-04-12 LAB
ANION GAP SERPL CALC-SCNC: 7 MMOL/L — SIGNIFICANT CHANGE UP (ref 5–17)
BUN SERPL-MCNC: 29 MG/DL — HIGH (ref 7–23)
CALCIUM SERPL-MCNC: 8.4 MG/DL — LOW (ref 8.5–10.1)
CHLORIDE SERPL-SCNC: 101 MMOL/L — SIGNIFICANT CHANGE UP (ref 96–108)
CO2 SERPL-SCNC: 34 MMOL/L — HIGH (ref 22–31)
CREAT SERPL-MCNC: 0.88 MG/DL — SIGNIFICANT CHANGE UP (ref 0.5–1.3)
GLUCOSE SERPL-MCNC: 155 MG/DL — HIGH (ref 70–99)
HCT VFR BLD CALC: 42.9 % — SIGNIFICANT CHANGE UP (ref 34.5–45)
HGB BLD-MCNC: 13.3 G/DL — SIGNIFICANT CHANGE UP (ref 11.5–15.5)
MCHC RBC-ENTMCNC: 27.5 PG — SIGNIFICANT CHANGE UP (ref 27–34)
MCHC RBC-ENTMCNC: 31 GM/DL — LOW (ref 32–36)
MCV RBC AUTO: 88.8 FL — SIGNIFICANT CHANGE UP (ref 80–100)
NRBC # BLD: 0 /100 WBCS — SIGNIFICANT CHANGE UP (ref 0–0)
PLATELET # BLD AUTO: 217 K/UL — SIGNIFICANT CHANGE UP (ref 150–400)
POTASSIUM SERPL-MCNC: 4.3 MMOL/L — SIGNIFICANT CHANGE UP (ref 3.5–5.3)
POTASSIUM SERPL-SCNC: 4.3 MMOL/L — SIGNIFICANT CHANGE UP (ref 3.5–5.3)
RBC # BLD: 4.83 M/UL — SIGNIFICANT CHANGE UP (ref 3.8–5.2)
RBC # FLD: 14.5 % — SIGNIFICANT CHANGE UP (ref 10.3–14.5)
SODIUM SERPL-SCNC: 142 MMOL/L — SIGNIFICANT CHANGE UP (ref 135–145)
WBC # BLD: 6.96 K/UL — SIGNIFICANT CHANGE UP (ref 3.8–10.5)
WBC # FLD AUTO: 6.96 K/UL — SIGNIFICANT CHANGE UP (ref 3.8–10.5)

## 2019-04-12 PROCEDURE — 99233 SBSQ HOSP IP/OBS HIGH 50: CPT

## 2019-04-12 RX ORDER — FUROSEMIDE 40 MG
40 TABLET ORAL ONCE
Qty: 0 | Refills: 0 | Status: COMPLETED | OUTPATIENT
Start: 2019-04-12 | End: 2019-04-12

## 2019-04-12 RX ORDER — AZITHROMYCIN 500 MG/1
500 TABLET, FILM COATED ORAL DAILY
Qty: 0 | Refills: 0 | Status: DISCONTINUED | OUTPATIENT
Start: 2019-04-12 | End: 2019-04-16

## 2019-04-12 RX ORDER — FUROSEMIDE 40 MG
40 TABLET ORAL DAILY
Qty: 0 | Refills: 0 | Status: DISCONTINUED | OUTPATIENT
Start: 2019-04-12 | End: 2019-04-13

## 2019-04-12 RX ADMIN — ENOXAPARIN SODIUM 40 MILLIGRAM(S): 100 INJECTION SUBCUTANEOUS at 21:34

## 2019-04-12 RX ADMIN — Medication 400 MILLIGRAM(S): at 06:09

## 2019-04-12 RX ADMIN — Medication 3 MILLILITER(S): at 00:22

## 2019-04-12 RX ADMIN — AZITHROMYCIN 500 MILLIGRAM(S): 500 TABLET, FILM COATED ORAL at 18:17

## 2019-04-12 RX ADMIN — Medication 400 MILLIGRAM(S): at 22:05

## 2019-04-12 RX ADMIN — Medication 3 MILLILITER(S): at 11:10

## 2019-04-12 RX ADMIN — Medication 3 MILLILITER(S): at 17:59

## 2019-04-12 RX ADMIN — LISINOPRIL 10 MILLIGRAM(S): 2.5 TABLET ORAL at 05:11

## 2019-04-12 RX ADMIN — Medication 40 MILLIGRAM(S): at 21:34

## 2019-04-12 RX ADMIN — Medication 3 MILLILITER(S): at 05:39

## 2019-04-12 RX ADMIN — Medication 400 MILLIGRAM(S): at 05:09

## 2019-04-12 RX ADMIN — Medication 40 MILLIGRAM(S): at 06:18

## 2019-04-12 RX ADMIN — BUDESONIDE AND FORMOTEROL FUMARATE DIHYDRATE 2 PUFF(S): 160; 4.5 AEROSOL RESPIRATORY (INHALATION) at 05:13

## 2019-04-12 RX ADMIN — Medication 400 MILLIGRAM(S): at 21:35

## 2019-04-12 RX ADMIN — BUDESONIDE AND FORMOTEROL FUMARATE DIHYDRATE 2 PUFF(S): 160; 4.5 AEROSOL RESPIRATORY (INHALATION) at 18:17

## 2019-04-12 NOTE — PROGRESS NOTE ADULT - SUBJECTIVE AND OBJECTIVE BOX
JAZMIN IRELAND [facility]  [phone]   ALLERGY nka   CONTACT Child Wendy Raymundo  self    Initial evaluation/Pulmonary Critical Care consultation requested on  4/8/2019  by Dr Duran  from Dr Foster   Patient examined chart reviewed    HOSPITAL ADMISSION 4/8/2019 [facility] VS DR MEGAN [facility]     PATIENT CAME  FROM (if information available)        TYPE OF VISIT      subsequent pulm followup       REASON FOR VISIT  Please see problem list                 VITALS/LABS PATIENT    BEKA WU 4/8/2019 ADMISSION [facility] VS DR MEGAN DURAN    4/12/2019 afeb 101 130/60 18 96%   4/12/2019 W 6.9 Hb 13.3 Plt 217 Na 142 K 4.3 CO2 34 Cr .8     REVIEW OF SYMPTOMS    Able to give ROS  Yes     RELIABLE No   CONSTITUTIONAL Weakness Yes  Chills No Vision changes No  ENDOCRINE No unexplained hair loss No heat or cold intolerance    ALLERGY No hives  Sore throat No   RESP Coughing blood no  Shortness of breath YES   NEURO No Headache  Confusion Pain neck No   CARDIAC No Chest pain No Palpitations   GI No Pain abdomen NO   Vomiting NO     PHYSICAL EXAM    HEENT Unremarkable PERRLA atraumatic   RESP Fair air entry EXP prolonged    Harsh breath sound Resp distres mild   CARDIAC S1 S2 No S3     NO JVD    ABDOMEN SOFT BS PRESENT NOT DISTENDED No hepatosplenomegaly PEDAL EDEMA present No calf tenderness  NO rash   GENERAL Not TOXIC looking

## 2019-04-12 NOTE — PROGRESS NOTE ADULT - SUBJECTIVE AND OBJECTIVE BOX
Patient is a 73y old  Female who presents with a chief complaint of short of breath (08 Apr 2019 18:50)      INTERVAL HPI/OVERNIGHT EVENTS: none     MEDICATIONS  (STANDING):  ALBUTerol    90 MICROgram(s) HFA Inhaler 1 Puff(s) Inhalation every 4 hours  ALBUTerol/ipratropium for Nebulization 3 milliLiter(s) Nebulizer every 6 hours  azithromycin  IVPB 500 milliGRAM(s) IV Intermittent every 24 hours  azithromycin  IVPB      enoxaparin Injectable 40 milliGRAM(s) SubCutaneous every 24 hours  lisinopril 10 milliGRAM(s) Oral daily  methylPREDNISolone sodium succinate Injectable 40 milliGRAM(s) IV Push daily  tiotropium 18 MICROgram(s) Capsule 1 Capsule(s) Inhalation daily    MEDICATIONS  (PRN):  ibuprofen  Tablet. 400 milliGRAM(s) Oral every 6 hours PRN Mild Pain (1 - 3)        Allergies    No Known Allergies    Intolerances  VSS    PHYSICAL EXAM:  GENERAL: NAD, well-groomed, well-developed  HEAD:  Atraumatic, Normocephalic  EYES: EOMI, PERRLA, conjunctiva and sclera clear  ENMT: No tonsillar erythema, exudates, or enlargement; Moist mucous membranes, Good dentition, No lesions  NECK: Supple, No JVD, Normal thyroid  NERVOUS SYSTEM:  Alert & Oriented X3, Good concentration; Motor Strength 5/5 B/L upper and lower extremities; DTRs 2+ intact and symmetric  CHEST/LUNG: significantwheezing b/l. no distress or use of accessory muscles   HEART: Regular rate and rhythm; No murmurs, rubs, or gallops  ABDOMEN: Soft, Nontender, Nondistended; Bowel sounds present  EXTREMITIES:  2+ Peripheral Pulses, 3+edema b/l  LYMPH: No lymphadenopathy noted  SKIN: No rashes or lesions    LABS:                                   reviewed     CAPILLARY BLOOD GLUCOSE          RADIOLOGY & ADDITIONAL TESTS:    Imaging Personally Reviewed:  [ ] YES  [ ] NO    Consultant(s) Notes Reviewed:  [ ] YES  [ ] NO    Care Discussed with Consultants/Other Providers [ ] YES  [ ] NO

## 2019-04-13 LAB
ANION GAP SERPL CALC-SCNC: 7 MMOL/L — SIGNIFICANT CHANGE UP (ref 5–17)
BUN SERPL-MCNC: 25 MG/DL — HIGH (ref 7–23)
CALCIUM SERPL-MCNC: 8.6 MG/DL — SIGNIFICANT CHANGE UP (ref 8.5–10.1)
CHLORIDE SERPL-SCNC: 100 MMOL/L — SIGNIFICANT CHANGE UP (ref 96–108)
CO2 SERPL-SCNC: 33 MMOL/L — HIGH (ref 22–31)
CREAT SERPL-MCNC: 0.82 MG/DL — SIGNIFICANT CHANGE UP (ref 0.5–1.3)
CULTURE RESULTS: SIGNIFICANT CHANGE UP
CULTURE RESULTS: SIGNIFICANT CHANGE UP
GLUCOSE SERPL-MCNC: 150 MG/DL — HIGH (ref 70–99)
HCT VFR BLD CALC: 43.1 % — SIGNIFICANT CHANGE UP (ref 34.5–45)
HGB BLD-MCNC: 13.3 G/DL — SIGNIFICANT CHANGE UP (ref 11.5–15.5)
MCHC RBC-ENTMCNC: 27.1 PG — SIGNIFICANT CHANGE UP (ref 27–34)
MCHC RBC-ENTMCNC: 30.9 GM/DL — LOW (ref 32–36)
MCV RBC AUTO: 88 FL — SIGNIFICANT CHANGE UP (ref 80–100)
NRBC # BLD: 0 /100 WBCS — SIGNIFICANT CHANGE UP (ref 0–0)
PLATELET # BLD AUTO: 217 K/UL — SIGNIFICANT CHANGE UP (ref 150–400)
POTASSIUM SERPL-MCNC: 4.3 MMOL/L — SIGNIFICANT CHANGE UP (ref 3.5–5.3)
POTASSIUM SERPL-SCNC: 4.3 MMOL/L — SIGNIFICANT CHANGE UP (ref 3.5–5.3)
RBC # BLD: 4.9 M/UL — SIGNIFICANT CHANGE UP (ref 3.8–5.2)
RBC # FLD: 14.6 % — HIGH (ref 10.3–14.5)
SODIUM SERPL-SCNC: 140 MMOL/L — SIGNIFICANT CHANGE UP (ref 135–145)
SPECIMEN SOURCE: SIGNIFICANT CHANGE UP
SPECIMEN SOURCE: SIGNIFICANT CHANGE UP
WBC # BLD: 7.05 K/UL — SIGNIFICANT CHANGE UP (ref 3.8–10.5)
WBC # FLD AUTO: 7.05 K/UL — SIGNIFICANT CHANGE UP (ref 3.8–10.5)

## 2019-04-13 PROCEDURE — 99233 SBSQ HOSP IP/OBS HIGH 50: CPT

## 2019-04-13 RX ADMIN — BUDESONIDE AND FORMOTEROL FUMARATE DIHYDRATE 2 PUFF(S): 160; 4.5 AEROSOL RESPIRATORY (INHALATION) at 19:26

## 2019-04-13 RX ADMIN — Medication 400 MILLIGRAM(S): at 22:02

## 2019-04-13 RX ADMIN — Medication 400 MILLIGRAM(S): at 16:23

## 2019-04-13 RX ADMIN — Medication 40 MILLIGRAM(S): at 22:03

## 2019-04-13 RX ADMIN — Medication 3 MILLILITER(S): at 00:27

## 2019-04-13 RX ADMIN — Medication 3 MILLILITER(S): at 05:14

## 2019-04-13 RX ADMIN — ENOXAPARIN SODIUM 40 MILLIGRAM(S): 100 INJECTION SUBCUTANEOUS at 22:03

## 2019-04-13 RX ADMIN — AZITHROMYCIN 500 MILLIGRAM(S): 500 TABLET, FILM COATED ORAL at 19:27

## 2019-04-13 RX ADMIN — Medication 400 MILLIGRAM(S): at 15:26

## 2019-04-13 RX ADMIN — Medication 40 MILLIGRAM(S): at 08:13

## 2019-04-13 RX ADMIN — Medication 3 MILLILITER(S): at 23:47

## 2019-04-13 RX ADMIN — Medication 3 MILLILITER(S): at 11:22

## 2019-04-13 RX ADMIN — LISINOPRIL 10 MILLIGRAM(S): 2.5 TABLET ORAL at 05:37

## 2019-04-13 RX ADMIN — BUDESONIDE AND FORMOTEROL FUMARATE DIHYDRATE 2 PUFF(S): 160; 4.5 AEROSOL RESPIRATORY (INHALATION) at 05:37

## 2019-04-13 NOTE — PROGRESS NOTE ADULT - SUBJECTIVE AND OBJECTIVE BOX
Patient is a 73y old  Female who presents with a chief complaint of short of breath (08 Apr 2019 18:50)      INTERVAL HPI/OVERNIGHT EVENTS: none. pt reports the swelling has improved in her legs     MEDICATIONS  (STANDING):  ALBUTerol    90 MICROgram(s) HFA Inhaler 1 Puff(s) Inhalation every 4 hours  ALBUTerol/ipratropium for Nebulization 3 milliLiter(s) Nebulizer every 6 hours  azithromycin   Tablet 500 milliGRAM(s) Oral daily  buDESOnide 160 MICROgram(s)/formoterol 4.5 MICROgram(s) Inhaler 2 Puff(s) Inhalation two times a day  enoxaparin Injectable 40 milliGRAM(s) SubCutaneous every 24 hours  furosemide    Tablet 40 milliGRAM(s) Oral daily  lisinopril 10 milliGRAM(s) Oral daily  methylPREDNISolone sodium succinate Injectable 40 milliGRAM(s) IV Push daily  tiotropium 18 MICROgram(s) Capsule 1 Capsule(s) Inhalation daily    MEDICATIONS  (PRN):  ibuprofen  Tablet. 400 milliGRAM(s) Oral every 6 hours PRN Mild Pain (1 - 3)        Allergies    No Known Allergies    Intolerances  VSS    PHYSICAL EXAM:  GENERAL: NAD, well-groomed, well-developed  HEAD:  Atraumatic, Normocephalic  EYES: EOMI, PERRLA, conjunctiva and sclera clear  ENMT: No tonsillar erythema, exudates, or enlargement; Moist mucous membranes, Good dentition, No lesions  NECK: Supple, No JVD, Normal thyroid  NERVOUS SYSTEM:  Alert & Oriented X3, Good concentration; Motor Strength 5/5 B/L upper and lower extremities; DTRs 2+ intact and symmetric  CHEST/LUNG: significant wheezing b/l. no distress or use of accessory muscles   HEART: Regular rate and rhythm; No murmurs, rubs, or gallops  ABDOMEN: Soft, Nontender, Nondistended; Bowel sounds present  EXTREMITIES:  2+ Peripheral Pulses, 3+edema b/l  LYMPH: No lymphadenopathy noted  SKIN: No rashes or lesions    LABS:                                   13.3   7.05  )-----------( 217      ( 13 Apr 2019 07:37 )             43.1     04-13    140  |  100  |  25<H>  ----------------------------<  150<H>  4.3   |  33<H>  |  0.82    Ca    8.6      13 Apr 2019 07:37              CAPILLARY BLOOD GLUCOSE          RADIOLOGY & ADDITIONAL TESTS:    Imaging Personally Reviewed:  [ ] YES  [ ] NO    Consultant(s) Notes Reviewed:  [ ] YES  [ ] NO    Care Discussed with Consultants/Other Providers [ ] YES  [ ] NO

## 2019-04-13 NOTE — PROGRESS NOTE ADULT - SUBJECTIVE AND OBJECTIVE BOX
Patient was examined Chart reviewed Detailed note will be inserted below after going over latest data Meanwhile please refer to my previous notes for Pulmonary/Critical Care assessment recommendations Patient was examined Chart reviewed Detailed note will be inserted below after going over latest data Meanwhile please refer to my previous notes for Pulmonary/Critical Care assessment recommendations     JAZMIN IRELAND  [phone]   ALLERGY nka   CONTACT Child Wendy Raymundo  self    Initial evaluation/Pulmonary Critical Care consultation requested on  4/8/2019  by Dr Duran  from Dr Foster   Patient examined chart reviewed    HOSPITAL ADMISSION 4/8/2019 [facility] VS DR MEGAN DUR[facility]     PATIENT CAME  FROM (if information available)        TYPE OF VISIT      subsequent pulm followup       REASON FOR VISIT  Please see problem list                 VITALS/LABS PATIENT    BEKA WU 4/8/2019 ADMISSION [facility] VS DR MEGAN DURAN    4/13/2019 afeb 100 140/80 18 95%   4/13/2019 W 7 Hb 13.3 Plt 217 Na 140 K 4.3 CO2 33 Cr .8     REVIEW OF SYMPTOMS    Able to give ROS  Yes     RELIABLE No   CONSTITUTIONAL Weakness Yes  Chills No Vision changes No  ENDOCRINE No unexplained hair loss No heat or cold intolerance    ALLERGY No hives  Sore throat No   RESP Coughing blood no  Shortness of breath YES   NEURO No Headache  Confusion Pain neck No   CARDIAC No Chest pain No Palpitations   GI No Pain abdomen NO   Vomiting NO     PHYSICAL EXAM  Morbid obesity Leg lymphedema   HEENT Unremarkable PERRLA atraumatic   RESP Fair air entry EXP prolonged    Harsh breath sound Resp distres mild   CARDIAC S1 S2 No S3     NO JVD    ABDOMEN SOFT BS PRESENT NOT DISTENDED No hepatosplenomegaly PEDAL EDEMA present No calf tenderness  NO rash   GENERAL Not TOXIC looking

## 2019-04-14 LAB
BASE EXCESS BLDA CALC-SCNC: 8.2 MMOL/L — HIGH (ref -2–2)
BLOOD GAS COMMENTS: SIGNIFICANT CHANGE UP
BLOOD GAS COMMENTS: SIGNIFICANT CHANGE UP
BLOOD GAS SOURCE: SIGNIFICANT CHANGE UP
HCO3 BLDA-SCNC: 33 MMOL/L — HIGH (ref 21–29)
HOROWITZ INDEX BLDA+IHG-RTO: 32 — SIGNIFICANT CHANGE UP
PCO2 BLDA: 47 MMHG — HIGH (ref 32–46)
PH BLD: 7.46 — HIGH (ref 7.35–7.45)
PO2 BLDA: 80 MMHG — SIGNIFICANT CHANGE UP (ref 74–108)
SAO2 % BLDA: 96 % — SIGNIFICANT CHANGE UP (ref 92–96)

## 2019-04-14 PROCEDURE — 99233 SBSQ HOSP IP/OBS HIGH 50: CPT

## 2019-04-14 RX ADMIN — Medication 400 MILLIGRAM(S): at 07:11

## 2019-04-14 RX ADMIN — Medication 3 MILLILITER(S): at 23:49

## 2019-04-14 RX ADMIN — Medication 400 MILLIGRAM(S): at 22:28

## 2019-04-14 RX ADMIN — Medication 400 MILLIGRAM(S): at 05:41

## 2019-04-14 RX ADMIN — Medication 40 MILLIGRAM(S): at 22:29

## 2019-04-14 RX ADMIN — Medication 400 MILLIGRAM(S): at 01:03

## 2019-04-14 RX ADMIN — AZITHROMYCIN 500 MILLIGRAM(S): 500 TABLET, FILM COATED ORAL at 17:18

## 2019-04-14 RX ADMIN — Medication 3 MILLILITER(S): at 11:44

## 2019-04-14 RX ADMIN — ENOXAPARIN SODIUM 40 MILLIGRAM(S): 100 INJECTION SUBCUTANEOUS at 22:29

## 2019-04-14 RX ADMIN — Medication 3 MILLILITER(S): at 05:10

## 2019-04-14 RX ADMIN — BUDESONIDE AND FORMOTEROL FUMARATE DIHYDRATE 2 PUFF(S): 160; 4.5 AEROSOL RESPIRATORY (INHALATION) at 05:38

## 2019-04-14 RX ADMIN — Medication 400 MILLIGRAM(S): at 23:59

## 2019-04-14 RX ADMIN — BUDESONIDE AND FORMOTEROL FUMARATE DIHYDRATE 2 PUFF(S): 160; 4.5 AEROSOL RESPIRATORY (INHALATION) at 17:19

## 2019-04-14 RX ADMIN — LISINOPRIL 10 MILLIGRAM(S): 2.5 TABLET ORAL at 05:39

## 2019-04-14 RX ADMIN — Medication 3 MILLILITER(S): at 17:06

## 2019-04-14 NOTE — PROGRESS NOTE ADULT - SUBJECTIVE AND OBJECTIVE BOX
Patient was examined Chart reviewed Detailed note will be inserted below after going over latest data Meanwhile please refer to my previous notes for Pulmonary/Critical Care assessment recommendations Patient was examined Chart reviewed Detailed note will be inserted below after going over latest data Meanwhile please refer to my previous notes for Pulmonary/Critical Care assessment recommendations     JAZMIN IRELAND  [phone]   ALLERGY nka   CONTACT Child Wendy Raymundo  self    Initial evaluation/Pulmonary Critical Care consultation requested on  4/8/2019  by Dr Duran  from Dr Foster   Patient examined chart reviewed    HOSPITAL ADMISSION 4/8/2019 [facility] VS DR MEGAN D[facility]     PATIENT CAME  FROM (if information available)        TYPE OF VISIT      subsequent pulm followup       REASON FOR VISIT  Please see problem list          VITALS/LABS PATIENT    BEKA WU 4/8/2019 ADMISSION [facility] VS DR MEGAN DURAN    4/14/2019 afeb 93 140/80 18 97%   4/13/2019 afeb 100 140/80 18 95%   4/13/2019 W 7 Hb 13.3 Plt 217 Na 140 K 4.3 CO2 33 Cr .8     REVIEW OF SYMPTOMS    Able to give ROS  Yes     RELIABLE No   CONSTITUTIONAL Weakness Yes  Chills No Vision changes No  ENDOCRINE No unexplained hair loss No heat or cold intolerance    ALLERGY No hives  Sore throat No   RESP Coughing blood no  Shortness of breath YES   NEURO No Headache  Confusion Pain neck No   CARDIAC No Chest pain No Palpitations   GI No Pain abdomen NO   Vomiting NO     PHYSICAL EXAM  Morbid obesity Leg lymphedema   HEENT Unremarkable PERRLA atraumatic   RESP Fair air entry EXP prolonged    Harsh breath sound Resp distres mild   CARDIAC S1 S2 No S3     NO JVD    ABDOMEN SOFT BS PRESENT NOT DISTENDED No hepatosplenomegaly PEDAL EDEMA present No calf tenderness  NO rash   GENERAL Not TOXIC looking

## 2019-04-14 NOTE — PROGRESS NOTE ADULT - SUBJECTIVE AND OBJECTIVE BOX
Patient is a 73y old  Female who presents with a chief complaint of short of breath (08 Apr 2019 18:50)      INTERVAL HPI/OVERNIGHT EVENTS: none. breathing improved     MEDICATIONS  (STANDING):  ALBUTerol    90 MICROgram(s) HFA Inhaler 1 Puff(s) Inhalation every 4 hours  ALBUTerol/ipratropium for Nebulization 3 milliLiter(s) Nebulizer every 6 hours  azithromycin   Tablet 500 milliGRAM(s) Oral daily  buDESOnide 160 MICROgram(s)/formoterol 4.5 MICROgram(s) Inhaler 2 Puff(s) Inhalation two times a day  enoxaparin Injectable 40 milliGRAM(s) SubCutaneous every 24 hours  lisinopril 10 milliGRAM(s) Oral daily  methylPREDNISolone sodium succinate Injectable 40 milliGRAM(s) IV Push daily  tiotropium 18 MICROgram(s) Capsule 1 Capsule(s) Inhalation daily    MEDICATIONS  (PRN):  ibuprofen  Tablet. 400 milliGRAM(s) Oral every 6 hours PRN Mild Pain (1 - 3)        Allergies    No Known Allergies    Intolerances  Vital Signs Last 24 Hrs  T(C): 36.4 (14 Apr 2019 12:07), Max: 36.7 (14 Apr 2019 01:08)  T(F): 97.6 (14 Apr 2019 12:07), Max: 98 (14 Apr 2019 01:08)  HR: 94 (14 Apr 2019 12:07) (79 - 116)  BP: 142/81 (14 Apr 2019 12:07) (142/81 - 155/70)  BP(mean): --  RR: 18 (14 Apr 2019 12:07) (17 - 18)  SpO2: 97% (14 Apr 2019 12:07) (91% - 99%)    PHYSICAL EXAM:  GENERAL: NAD, well-groomed, well-developed  HEAD:  Atraumatic, Normocephalic  EYES: EOMI, PERRLA, conjunctiva and sclera clear  ENMT: No tonsillar erythema, exudates, or enlargement; Moist mucous membranes, Good dentition, No lesions  NECK: Supple, No JVD, Normal thyroid  NERVOUS SYSTEM:  Alert & Oriented X3, Good concentration; Motor Strength 5/5 B/L upper and lower extremities; DTRs 2+ intact and symmetric  CHEST/LUNG: improved wheezing b/l. no distress or use of accessory muscles   HEART: Regular rate and rhythm; No murmurs, rubs, or gallops  ABDOMEN: Soft, Nontender, Nondistended; Bowel sounds present  EXTREMITIES:  2+ Peripheral Pulses, 3+edema b/l  LYMPH: No lymphadenopathy noted  SKIN: No rashes or lesions    LABS:                                     13.3   7.05  )-----------( 217      ( 13 Apr 2019 07:37 )             43.1     04-13    140  |  100  |  25<H>  ----------------------------<  150<H>  4.3   |  33<H>  |  0.82    Ca    8.6      13 Apr 2019 07:37                  CAPILLARY BLOOD GLUCOSE          RADIOLOGY & ADDITIONAL TESTS:    Imaging Personally Reviewed:  [ ] YES  [ ] NO    Consultant(s) Notes Reviewed:  [ ] YES  [ ] NO    Care Discussed with Consultants/Other Providers [ ] YES  [ ] NO

## 2019-04-15 ENCOUNTER — TRANSCRIPTION ENCOUNTER (OUTPATIENT)
Age: 74
End: 2019-04-15

## 2019-04-15 PROCEDURE — 99233 SBSQ HOSP IP/OBS HIGH 50: CPT

## 2019-04-15 RX ADMIN — Medication 400 MILLIGRAM(S): at 05:24

## 2019-04-15 RX ADMIN — Medication 3 MILLILITER(S): at 05:17

## 2019-04-15 RX ADMIN — LISINOPRIL 10 MILLIGRAM(S): 2.5 TABLET ORAL at 05:24

## 2019-04-15 RX ADMIN — Medication 3 MILLILITER(S): at 11:17

## 2019-04-15 RX ADMIN — Medication 400 MILLIGRAM(S): at 22:31

## 2019-04-15 RX ADMIN — BUDESONIDE AND FORMOTEROL FUMARATE DIHYDRATE 2 PUFF(S): 160; 4.5 AEROSOL RESPIRATORY (INHALATION) at 05:23

## 2019-04-15 RX ADMIN — ENOXAPARIN SODIUM 40 MILLIGRAM(S): 100 INJECTION SUBCUTANEOUS at 22:25

## 2019-04-15 RX ADMIN — Medication 400 MILLIGRAM(S): at 06:35

## 2019-04-15 RX ADMIN — Medication 400 MILLIGRAM(S): at 23:31

## 2019-04-15 RX ADMIN — Medication 40 MILLIGRAM(S): at 22:24

## 2019-04-15 RX ADMIN — Medication 3 MILLILITER(S): at 17:58

## 2019-04-15 NOTE — PROGRESS NOTE ADULT - SUBJECTIVE AND OBJECTIVE BOX
Patient is a 73y old  Female who presents with a chief complaint of short of breath (08 Apr 2019 18:50)      INTERVAL HPI/OVERNIGHT EVENTS: none. breathing improving     MEDICATIONS  (STANDING):  ALBUTerol    90 MICROgram(s) HFA Inhaler 1 Puff(s) Inhalation every 4 hours  ALBUTerol/ipratropium for Nebulization 3 milliLiter(s) Nebulizer every 6 hours  azithromycin   Tablet 500 milliGRAM(s) Oral daily  buDESOnide 160 MICROgram(s)/formoterol 4.5 MICROgram(s) Inhaler 2 Puff(s) Inhalation two times a day  enoxaparin Injectable 40 milliGRAM(s) SubCutaneous every 24 hours  lisinopril 10 milliGRAM(s) Oral daily  methylPREDNISolone sodium succinate Injectable 40 milliGRAM(s) IV Push daily  tiotropium 18 MICROgram(s) Capsule 1 Capsule(s) Inhalation daily    MEDICATIONS  (PRN):  ibuprofen  Tablet. 400 milliGRAM(s) Oral every 6 hours PRN Mild Pain (1 - 3)        Allergies    No Known Allergies    Vital Signs Last 24 Hrs  T(C): 36.3 (15 Apr 2019 11:12), Max: 36.6 (14 Apr 2019 23:57)  T(F): 97.4 (15 Apr 2019 11:12), Max: 97.9 (14 Apr 2019 23:57)  HR: 101 (15 Apr 2019 11:18) (74 - 108)  BP: 146/66 (15 Apr 2019 11:12) (123/85 - 153/74)  BP(mean): --  RR: 18 (15 Apr 2019 11:12) (17 - 20)  SpO2: 95% (15 Apr 2019 11:18) (87% - 99%)    PHYSICAL EXAM:  GENERAL: NAD, well-groomed, well-developed  HEAD:  Atraumatic, Normocephalic  EYES: EOMI, PERRLA, conjunctiva and sclera clear  ENMT: No tonsillar erythema, exudates, or enlargement; Moist mucous membranes, Good dentition, No lesions  NECK: Supple, No JVD, Normal thyroid  NERVOUS SYSTEM:  Alert & Oriented X3, Good concentration; Motor Strength 5/5 B/L upper and lower extremities; DTRs 2+ intact and symmetric  CHEST/LUNG: improved wheezing b/l. no distress or use of accessory muscles   HEART: Regular rate and rhythm; No murmurs, rubs, or gallops  ABDOMEN: Soft, Nontender, Nondistended; Bowel sounds present  EXTREMITIES:  2+ Peripheral Pulses, 3+edema b/l  LYMPH: No lymphadenopathy noted  SKIN: No rashes or lesions    LABS:                                        CAPILLARY BLOOD GLUCOSE          RADIOLOGY & ADDITIONAL TESTS:    Imaging Personally Reviewed:  [ ] YES  [ ] NO    Consultant(s) Notes Reviewed:  [x ] YES  [ ] NO    Care Discussed with Consultants/Other Providers [ x] YES  [ ] NO

## 2019-04-15 NOTE — DISCHARGE NOTE PROVIDER - HOSPITAL COURSE
73f with history of emphysema  with acute respiratory failure                      Problem/Plan - 1:    ·  Problem: COPD exacerbation.  Plan: lung exam continues to improve and remains  afebrile. no distress. o2 drops below 88 on RA with exertion and would benefit from home oxygen     c/w steroids as per pulnasim morales     pulmonary consult appreciated    plan to discharge tomorrow.          Problem/Plan - 2:    ·  Problem: Essential hypertension.  Plan: lisinopril.          Problem/Plan - 3:    ·  Problem: Acute on chronic respiratory failure with hypoxia and hypercapnia.  Plan: as above.          Problem/Plan - 4:    ·  Problem: Hypertrophic toenail.  Plan: podiatry consult done.          Problem/Plan - 5:    ·  Problem: Lower extremity edema.  Plan: improving with elevation     pt asking to stop lasix    will try compression stocking.

## 2019-04-15 NOTE — PROGRESS NOTE ADULT - SUBJECTIVE AND OBJECTIVE BOX
Chart reviewed Detailed note will be inserted below after going over latest data Meanwhile please refer to my previous notes for Pulmonary/Critical Care assessment recommendations     PLEASE NOTE Upon examination and evaluation of patient I believe that she has COPD She is in stable condition now and her ra pulse ox drops to 86% which is likely secondary to COPD She should be given home oxygen 24/7 portable and stationary as this will benefit her a lot

## 2019-04-15 NOTE — PROGRESS NOTE ADULT - SUBJECTIVE AND OBJECTIVE BOX
[facility]  [phone]   ALLERGY nka   CONTACT Child Wendy Raymundo  self    Initial evaluation/Pulmonary Critical Care consultation requested on  4/8/2019  by Dr Duran  from Dr Foster   Patient examined chart reviewed    HOSPITAL ADMISSION 4/8/2019 [facility] VS DR MEGAN DURAN     PATIENT CAME  FROM (if information available)        TYPE OF VISIT      subsequent pulm followup       REASON FOR VISIT  Please see problem list                 VITALS/LABS PATIENT    BEKA WU 4/8/2019 ADMISSION [facility] VS DR MEGAN DURAN    4/15/2019 afeb 101 140/66 18 93%   4/14/2019 afeb 93 140/80 18 97%   4/13/2019 afeb 100 140/80 18 95%   4/13/2019 W 7 Hb 13.3 Plt 217 Na 140 K 4.3 CO2 33 Cr .8     REVIEW OF SYMPTOMS    Able to give ROS  Yes     RELIABLE No   CONSTITUTIONAL Weakness Yes  Chills No Vision changes No  ENDOCRINE No unexplained hair loss No heat or cold intolerance    ALLERGY No hives  Sore throat No   RESP Coughing blood no  Shortness of breath YES   NEURO No Headache  Confusion Pain neck No   CARDIAC No Chest pain No Palpitations   GI No Pain abdomen NO   Vomiting NO     PHYSICAL EXAM  Morbid obesity Leg lymphedema   HEENT Unremarkable PERRLA atraumatic   RESP Fair air entry EXP prolonged    Harsh breath sound Resp distres mild   CARDIAC S1 S2 No S3     NO JVD    ABDOMEN SOFT BS PRESENT NOT DISTENDED No hepatosplenomegaly PEDAL EDEMA present No calf tenderness  NO rash   GENERAL Not TOXIC looking

## 2019-04-15 NOTE — DISCHARGE NOTE PROVIDER - CARE PROVIDER_API CALL
Bijan Foster)  Critical Care Medicine; Internal Medicine; Pulmonary Disease  [address] [place] [address]  Phone: [phone]  Fax: [phone]  Follow Up Time:

## 2019-04-15 NOTE — DISCHARGE NOTE NURSING/CASE MANAGEMENT/SOCIAL WORK - NSDCDPATPORTLINK_GEN_ALL_CORE
You can access the [facility] Patient Portal, offered by [facility], by registering with the following website: [email or web address]

## 2019-04-16 VITALS
RESPIRATION RATE: 18 BRPM | TEMPERATURE: 98 F | SYSTOLIC BLOOD PRESSURE: 135 MMHG | DIASTOLIC BLOOD PRESSURE: 81 MMHG | HEART RATE: 102 BPM | OXYGEN SATURATION: 93 %

## 2019-04-16 PROCEDURE — 99238 HOSP IP/OBS DSCHRG MGMT 30/<: CPT

## 2019-04-16 RX ORDER — ALBUTEROL 90 UG/1
2 AEROSOL, METERED ORAL
Qty: 1 | Refills: 0
Start: 2019-04-16

## 2019-04-16 RX ORDER — AZITHROMYCIN 500 MG/1
1 TABLET, FILM COATED ORAL
Qty: 3 | Refills: 0
Start: 2019-04-16

## 2019-04-16 RX ORDER — AMLODIPINE BESYLATE 2.5 MG/1
1 TABLET ORAL
Qty: 0 | Refills: 0 | COMMUNITY

## 2019-04-16 RX ORDER — TIOTROPIUM BROMIDE 18 UG/1
1 CAPSULE ORAL; RESPIRATORY (INHALATION)
Qty: 1 | Refills: 0
Start: 2019-04-16

## 2019-04-16 RX ADMIN — Medication 3 MILLILITER(S): at 00:23

## 2019-04-16 RX ADMIN — LISINOPRIL 10 MILLIGRAM(S): 2.5 TABLET ORAL at 06:07

## 2019-04-16 RX ADMIN — Medication 3 MILLILITER(S): at 11:42

## 2019-04-16 RX ADMIN — Medication 400 MILLIGRAM(S): at 07:07

## 2019-04-16 RX ADMIN — Medication 3 MILLILITER(S): at 05:34

## 2019-04-16 RX ADMIN — Medication 400 MILLIGRAM(S): at 06:07

## 2019-04-16 RX ADMIN — BUDESONIDE AND FORMOTEROL FUMARATE DIHYDRATE 2 PUFF(S): 160; 4.5 AEROSOL RESPIRATORY (INHALATION) at 06:07

## 2019-04-16 NOTE — PROGRESS NOTE ADULT - PROBLEM SELECTOR PROBLEM 4
Hypertrophic toenail

## 2019-04-16 NOTE — PROGRESS NOTE ADULT - REASON FOR ADMISSION
short of breath

## 2019-04-16 NOTE — PROGRESS NOTE ADULT - SUBJECTIVE AND OBJECTIVE BOX
INTERVAL HPI/OVERNIGHT EVENTS:   Patient seen and examined. oxygen arranged    MEDICATIONS  (STANDING):  ALBUTerol    90 MICROgram(s) HFA Inhaler 1 Puff(s) Inhalation every 4 hours  ALBUTerol/ipratropium for Nebulization 3 milliLiter(s) Nebulizer every 6 hours  azithromycin   Tablet 500 milliGRAM(s) Oral daily  buDESOnide 160 MICROgram(s)/formoterol 4.5 MICROgram(s) Inhaler 2 Puff(s) Inhalation two times a day  enoxaparin Injectable 40 milliGRAM(s) SubCutaneous every 24 hours  lisinopril 10 milliGRAM(s) Oral daily  methylPREDNISolone sodium succinate Injectable 40 milliGRAM(s) IV Push daily  tiotropium 18 MICROgram(s) Capsule 1 Capsule(s) Inhalation daily    MEDICATIONS  (PRN):  ibuprofen  Tablet. 400 milliGRAM(s) Oral every 6 hours PRN Mild Pain (1 - 3)      REVIEW OF SYSTEMS:  See HPI,  all others negative    PHYSICAL EXAM:  Vital Signs Last 24 Hrs  T(C): 36.4 (16 Apr 2019 12:13), Max: 36.5 (16 Apr 2019 00:17)  T(F): 97.6 (16 Apr 2019 12:13), Max: 97.7 (16 Apr 2019 00:17)  HR: 102 (16 Apr 2019 12:13) (69 - 102)  BP: 135/81 (16 Apr 2019 12:13) (135/81 - 149/74)  BP(mean): --  RR: 18 (16 Apr 2019 12:13) (16 - 18)  SpO2: 93% (16 Apr 2019 12:13) (89% - 96%)    GENERAL: NAD, well-groomed, well-developed, awake, alert, oriented x 3, fluent and coherent speech  HEAD:  Atraumatic, Normocephalic  EYES: EOMI, PERRLA, conjunctiva and sclera clear  ENMT: No tonsillar erythema, exudates, or enlargement; Moist mucous membranes, Good dentition, No lesions  NECK: Supple, No JVD, No Cervical LAD, No thyromegaly, No thyroid nodules felt  NERVOUS SYSTEM:  Good concentration; Moving all 4 extremities; No gross sensory deficits, No facial droop  CHEST WALL: No masses  CHEST/LUNG: Clear to auscultation bilaterally; No rales, rhonchi, wheezing, or rubs  HEART: Regular rate and rhythm; No murmurs, rubs, or gallops  ABDOMEN: Soft, Nontender, Nondistended, Bowel sounds present, No palpable masses or organomegaly, No bruits  EXTREMITIES:  2+ Peripheral Pulses, No clubbing, cyanosis, or edema  LYMPH: No lymphadenopathy  SKIN: No rashes or lesions    LABS:                 RADIOLOGY & ADDITIONAL TESTS:

## 2019-04-16 NOTE — PROGRESS NOTE ADULT - PROBLEM SELECTOR PLAN 4
podiatry consult done
podiatry consult done
needs podiatry consult
podiatry consult done

## 2019-04-16 NOTE — PROGRESS NOTE ADULT - PROVIDER SPECIALTY LIST ADULT
Hospitalist
Pulmonology
Hospitalist
Hospitalist

## 2019-04-16 NOTE — PROGRESS NOTE ADULT - ASSESSMENT
73f with history of emphysema  with acute respiratory failure
73f with history of emphysema  with acute trespiratory failure
GLOBAL ISSUE/BEST PRACTICE:      PROBLEM: HOB elevation:   y            PROBLEM: Stress ulcer proph:    na                      PROBLEM: VTE prophylaxis:      Lvnx 40 (4/8)   PROBLEM: Glycemic control:    na  PROBLEM: Nutrition:    dash (4/8)   PROBLEM: Advanced directive: na     PROBLEM: Allergies:  na    MEDICATIONS  PATIENT  BEKA WU 4/8/2019 ADMISSION [facility] VS DR MEGAN ARREOLA       ANTIBIO  azithro (4/8)      DVT P   Lvnx 40 (4/8)     HYPERTENSION  Lisinopril 10 (4/8)     COPD ex   DUONEB.4 (4/8)   spiriva (4/8)   symbicort (4/11)   Solumed 40.4 (4/8) ch solumed 40 (4/9)     BRIEF PATIENT  DESCRIPTION  PATIENT  BEKA WU 4/8/2019 ADMISSION [facility] VS DR MEGAN ARREOLA    Pt is a 72 yo lady with a past medical historyx of HTN who presents to the ED with sob. Has been going on for 3 days. Is a current smoker, does not have formal COPD diagnosis, but thinks she has emphysema.       PROBLEM  LIST  BEKA WU 4/8/2019 ADMISSION [facility] VS DR MEGAN ARREOLA    ACUTE COMBINED HYPOXIC HYPERCAPNIC RESP FAILURE POA 4/8/2019 4/14/2019 .32 746/47/80    4/8/2019 4p bpap 12.5..3 738/58/81     COPD   On BD steroids     INFECTION   4/10 RVP n 4/10 flu ab n rsv n  4/8 blood culture n     DYSPNEA   4/9 V duplex n   4/9/2019 echo ef 55% dd1 ivc resp variation over 50%     CAD  4/8/2019 Tr 1 n     ASSESSMENT RECOMMENDATIONS PATIENT     BEKA WU 4/8/2019 ADMISSION [facility] VS DR MEGAN ARREOLA       ACUTE COMBINED HYPOXIC HYPERCAPNIC RESP FAILURE POA 4/8/2019 4/8/2019 4p bpap 12.5..3 738/58/81   Monitor po abg Target po 90-95%    COPD   4/8/2019 Plan taper steroids 4/9 4/11/2019 added symbicrt     CAD  4/8/2019 Tr 1 n   MI ruled out     CARDIAC   4/8/2019 bnp 486   Good ef dd1 noted   Patient may have component of mild diastolic dysfunction but main problem seems to be dyspnea sec COPD ex and obesity     OBESITY   4/13/2019 Counseled on wt loss       NEED FOR HOME OXYGEN   4/12/2019 ra 89%   If ra pulse oxx remains above 88% then she may be dced without home O2   4/13/2019 will check  abg in am   4/13/2019 Chec po on ra at rest and on exertion to decide re home O2       DISPO  Will need home O2 at discharge if ra po falls below 88% at rest or on exertion  At DC change to Pred 30 and taper over 6 d     TIME SPENT Over 25 minutes aggregate care time spent on encounter; activities included   direct patient care, counseling and/or coordinating care reviewing notes, lab data/ imaging , discussion with multidisciplinary team/ patient  /family. Risks, benefits, alternatives  discussed in detail.
GLOBAL ISSUE/BEST PRACTICE:      PROBLEM: HOB elevation:   y            PROBLEM: Stress ulcer proph:    na                      PROBLEM: VTE prophylaxis:      Lvnx 40 (4/8)   PROBLEM: Glycemic control:    na  PROBLEM: Nutrition:    dash (4/8)   PROBLEM: Advanced directive: na     PROBLEM: Allergies:  na    MEDICATIONS  PATIENT  BEKA WU 4/8/2019 ADMISSION [facility] VS DR MEGAN ARREOLA       ANTIBIO  azithro (4/8)      DVT P   Lvnx 40 (4/8)     HYPERTENSION  Lisinopril 10 (4/8)     COPD ex   DUONEB.4 (4/8)   spiriva (4/8)   symbicort (4/11)   Solumed 40.4 (4/8) ch solumed 40 (4/9)     BRIEF PATIENT  DESCRIPTION  PATIENT  BEKA WU 4/8/2019 ADMISSION [facility] VS DR MEGAN ARREOL[facility]    Pt is a 72 yo lady with a past medical historyx of HTN who presents to the ED with sob. Has been going on for 3 days. Is a current smoker, does not have formal COPD diagnosis, but thinks she has emphysema.       PROBLEM  LIST  BEKA WU 4/8/2019 ADMISSION [facility] VS DR MEGAN ARREOLA    ACUTE COMBINED HYPOXIC HYPERCAPNIC RESP FAILURE POA 4/8/2019 4/8/2019 4p bpap 12.5..3 738/58/81     COPD   On BD steroids     INFECTION   4/10 RVP n 4/10 flu ab n rsv n     DYSPNEA   4/9 V duplex n   4/9/2019 echo ef 55% dd1 ivc resp variation over 50%     CAD  4/8/2019 Tr 1 n     ASSESSMENT RECOMMENDATIONS PATIENT     BEKA WU 4/8/2019 ADMISSION [facility] VS DR MEGAN ARREOLA       ACUTE COMBINED HYPOXIC HYPERCAPNIC RESP FAILURE POA 4/8/2019 4/8/2019 4p bpap 12.5..3 738/58/81   Monitor po abg Target po 90-95%    COPD   4/8/2019 Plan taper steroids 4/9 4/11/2019 added symbicrt     CAD  4/8/2019 Tr 1 n   Monitor enz    RULE OUT   CHF   4/8/2019 bnp 486   Good ef dd1 noted        NEED FOR HOME OXYGEN   4/12/2019 ra 89%   If ra pulse oxx remains above 88% then she may be dced without home O2     TIME SPENT Over 25 minutes aggregate care time spent on encounter; activities included   direct patient care, counseling and/or coordinating care reviewing notes, lab data/ imaging , discussion with multidisciplinary team/ patient  /family. Risks, benefits, alternatives  discussed in detail.
[facility]  [phone]   ALLERGY nka   CONTACT Child Wendy Raymundo  self    Initial evaluation/Pulmonary Critical Care consultation requested on  4/8/2019  by Dr Duran  from Dr Foster   Patient examined chart reviewed    HOSPITAL ADMISSION 4/8/2019 [facility] VS DR MEGAN DURAN     PATIENT CAME  FROM (if information available)        TYPE OF VISIT      subsequent pulm followup       REASON FOR VISIT  Please see problem list                 VITALS/LABS PATIENT    BEKA WU 4/8/2019 ADMISSION [facility] VS DR MEGAN DURAN    4/10/2019 afeb 92 113/70 22 94%   4/10/2019 W 11.5 Hb 12.9 Plt 242 Na 141 K 3.5 CO2 32 Cr 1    REVIEW OF SYMPTOMS    Able to give ROS  Yes     RELIABLE No   CONSTITUTIONAL Weakness Yes  Chills No Vision changes No  ENDOCRINE No unexplained hair loss No heat or cold intolerance    ALLERGY No hives  Sore throat No   RESP Coughing blood no  Shortness of breath YES   NEURO No Headache  Confusion Pain neck No   CARDIAC No Chest pain No Palpitations   GI No Pain abdomen NO   Vomiting NO     PHYSICAL EXAM    HEENT Unremarkable PERRLA atraumatic   RESP Fair air entry EXP prolonged    Harsh breath sound Resp distres mild   CARDIAC S1 S2 No S3     NO JVD    ABDOMEN SOFT BS PRESENT NOT DISTENDED No hepatosplenomegaly PEDAL EDEMA present No calf tenderness  NO rash   GENERAL Not TOXIC looking    GLOBAL ISSUE/BEST PRACTICE:      PROBLEM: HOB elevation:   y            PROBLEM: Stress ulcer proph:    na                      PROBLEM: VTE prophylaxis:      Lvnx 40 (4/8)   PROBLEM: Glycemic control:    na  PROBLEM: Nutrition:    dash (4/8)   PROBLEM: Advanced directive: na     PROBLEM: Allergies:  na    MEDICATIONS  PATIENT  BEKA WU 4/8/2019 ADMISSION [facility] VS DR MEGAN DURAN        DVT P   Lvnx 40 (4/8)     HYPERTENSION  Lisinopril 10 (4/8)     COPD ex   DUONEB.4 (4/8)   spiriva (4/8)   Solumed 40.4 (4/8) ch solumed 40 (4/9)     ASSESSMENT RECOMMENDATIONS PATIENT     BEKA WU 4/8/2019 ADMISSION [facility] VS DR MEGAN DURAN       ACUTE COMBINED HYPOXIC HYPERCAPNIC RESP FAILURE POA 4/8/2019 4/8/2019 4p bpap 12.5..3 738/58/81   Monitor po abg Target po 90-95%    COPD   4/8/2019 Plan taper steroids 4/9    CAD  4/8/2019 Tr 1 n   Monitor enz    POSSIBLE SYSTOLIC CHR  CHF   4/8/2019 bnp 486   4/8/2019 Check echo           TIME SPENT Over 25 minutes aggregate care time spent on encounter; activities included   direct patient care, counseling and/or coordinating care reviewing notes, lab data/ imaging , discussion with multidisciplinary team/ patient  /family. Risks, benefits, alternatives  discussed in detail.
GLOBAL ISSUE/BEST PRACTICE:      PROBLEM: HOB elevation:   y            PROBLEM: Stress ulcer proph:    na                      PROBLEM: VTE prophylaxis:      Lvnx 40 (4/8)   PROBLEM: Glycemic control:    na  PROBLEM: Nutrition:    dash (4/8)   PROBLEM: Advanced directive: na     PROBLEM: Allergies:  na    MEDICATIONS  PATIENT  BEKA WU 4/8/2019 ADMISSION [facility] VS DR MEGAN ARREOLA       ANTIBIO  azithro (4/8)      DVT P   Lvnx 40 (4/8)     HYPERTENSION  Lisinopril 10 (4/8)     COPD ex   DUONEB.4 (4/8)   spiriva (4/8)   symbicort (4/11)   Solumed 40.4 (4/8) ch solumed 40 (4/9)     BRIEF PATIENT  DESCRIPTION  PATIENT  BEKA WU 4/8/2019 ADMISSION [facility] VS DR MEGAN ARREOL[facility]    Pt is a 72 yo lady with a past medical historyx of HTN who presents to the ED with sob. Has been going on for 3 days. Is a current smoker, does not have formal COPD diagnosis, but thinks she has emphysema.       PROBLEM  LIST  BEKA WU 4/8/2019 ADMISSION [facility] VS DR MEGAN ARREOLA    ACUTE COMBINED HYPOXIC HYPERCAPNIC RESP FAILURE POA 4/8/2019 4/14/2019 .32 746/47/80    4/8/2019 4p bpap 12.5..3 738/58/81     COPD   On BD steroids     INFECTION   4/10 RVP n 4/10 flu ab n rsv n  4/8 blood culture n     DYSPNEA   4/9 V duplex n   4/9/2019 echo ef 55% dd1 ivc resp variation over 50%     CAD  4/8/2019 Tr 1 n     ASSESSMENT RECOMMENDATIONS PATIENT     BEKA WU 4/8/2019 ADMISSION [facility] VS DR MEGAN ARREOLA       ACUTE COMBINED HYPOXIC HYPERCAPNIC RESP FAILURE POA 4/8/2019 4/8/2019 4p bpap 12.5..3 738/58/81   Monitor po abg Target po 90-95%    COPD   4/8/2019 Plan taper steroids 4/9 4/11/2019 added symbicrt     CAD  4/8/2019 Tr 1 n   MI ruled out     CARDIAC   4/8/2019 bnp 486   Good ef dd1 noted   Patient may have component of mild diastolic dysfunction but main problem seems to be dyspnea sec COPD ex and obesity     OBESITY   4/13/2019 Counseled on wt loss       NEED FOR HOME OXYGEN   4/12/2019 ra 89%   If ra pulse oxx remains above 88% then she may be dced without home O2   4/13/2019 will check  abg in am   4/13/2019 Chec po on ra at rest and on exertion to decide re home O2       DISPO  Will need home O2 at discharge if ra po falls below 88% at rest or on exertion  At DC change to Pred 30 and taper over 6 d     TIME SPENT Over 25 minutes aggregate care time spent on encounter; activities included   direct patient care, counseling and/or coordinating care reviewing notes, lab data/ imaging , discussion with multidisciplinary team/ patient  /family. Risks, benefits, alternatives  discussed in detail
GLOBAL ISSUE/BEST PRACTICE:      PROBLEM: HOB elevation:   y            PROBLEM: Stress ulcer proph:    na                      PROBLEM: VTE prophylaxis:      Lvnx 40 (4/8)   PROBLEM: Glycemic control:    na  PROBLEM: Nutrition:    dash (4/8)   PROBLEM: Advanced directive: na     PROBLEM: Allergies:  na    MEDICATIONS  PATIENT  BEKA WU 4/8/2019 ADMISSION [facility] VS DR MEGAN ARREOLA       ANTIBIO  azithro (4/8)      DVT P   Lvnx 40 (4/8)     HYPERTENSION  Lisinopril 10 (4/8)     COPD ex   DUONEB.4 (4/8)   spiriva (4/8)   symbicort (4/11)   Solumed 40.4 (4/8) ch solumed 40 (4/9)     BRIEF PATIENT  DESCRIPTION  PATIENT  BEKA WU 4/8/2019 ADMISSION [facility] VS DR MEGAN AR[facility]OL[facility]    Pt is a 72 yo lady with a past medical historyx of HTN who presents to the ED with sob. Has been going on for 3 days. Is a current smoker, does not have formal COPD diagnosis, but thinks she has emphysema.       PROBLEM  LIST  BEKA WU 4/8/2019 ADMISSION [facility] VS DR MEGAN ARREOLA    ACUTE COMBINED HYPOXIC HYPERCAPNIC RESP FAILURE POA 4/8/2019 4/8/2019 4p bpap 12.5..3 738/58/81     COPD   On BD steroids     INFECTION   4/10 RVP n 4/10 flu ab n rsv n  4/8 blood culture n     DYSPNEA   4/9 V duplex n   4/9/2019 echo ef 55% dd1 ivc resp variation over 50%     CAD  4/8/2019 Tr 1 n     ASSESSMENT RECOMMENDATIONS PATIENT     BEKA WU 4/8/2019 ADMISSION [facility] VS DR MEGAN ARREOLA       ACUTE COMBINED HYPOXIC HYPERCAPNIC RESP FAILURE POA 4/8/2019 4/8/2019 4p bpap 12.5..3 738/58/81   Monitor po abg Target po 90-95%    COPD   4/8/2019 Plan taper steroids 4/9 4/11/2019 added symbicrt     CAD  4/8/2019 Tr 1 n   Monitor enz    RULE OUT   CHF   4/8/2019 bnp 486   Good ef dd1 noted   Patient may have component of mild diastolic dysfunction     OBESITY   4/13/2019 Counseled on wt loss       NEED FOR HOME OXYGEN   4/12/2019 ra 89%   If ra pulse oxx remains above 88% then she may be dced without home O2   4/13/2019 will check  abg in am   4/13/2019 Chec po on ra at rest and on exertion to decide re home O2     TIME SPENT Over 25 minutes aggregate care time spent on encounter; activities included   direct patient care, counseling and/or coordinating care reviewing notes, lab data/ imaging , discussion with multidisciplinary team/ patient  /family. Risks, benefits, alternatives  discussed in detail.
GLOBAL ISSUE/BEST PRACTICE:      PROBLEM: HOB elevation:   y            PROBLEM: Stress ulcer proph:    na                      PROBLEM: VTE prophylaxis:      Lvnx 40 (4/8)   PROBLEM: Glycemic control:    na  PROBLEM: Nutrition:    dash (4/8)   PROBLEM: Advanced directive: na     PROBLEM: Allergies:  na    MEDICATIONS  PATIENT  BEKA WU 4/8/2019 ADMISSION [facility] VS DR MEGAN ARREOLA        DVT P   Lvnx 40 (4/8)     HYPERTENSION  Lisinopril 10 (4/8)     COPD ex   DUONEB.4 (4/8)   spiriva (4/8)   Solumed 40.4 (4/8) ch solumed 40 (4/9)     BRIEF PATIENT  DESCRIPTION  PATIENT  BEKA WU 4/8/2019 ADMISSION [facility] VS DR MEGAN ARREOL[facility]    Pt is a 72 yo lady with a past medical historyx of HTN who presents to the ED with sob. Has been going on for 3 days. Is a current smoker, does not have formal COPD diagnosis, but thinks she has emphysema.     ASSESSMENT RECOMMENDATIONS PATIENT     BEKA WU 4/8/2019 ADMISSION [facility] VS DR MEGAN ARREOLA       ACUTE COMBINED HYPOXIC HYPERCAPNIC RESP FAILURE POA 4/8/2019 4/8/2019 4p bpap 12.5..3 738/58/81   Monitor po abg Target po 90-95%    COPD   4/8/2019 Plan taper steroids 4/9    CAD  4/8/2019 Tr 1 n   Monitor enz    POSSIBLE SYSTOLIC CHR  CHF   4/8/2019 bnp 486   4/8/2019 Check echo           TIME SPENT Over 25 minutes aggregate care time spent on encounter; activities included   direct patient care, counseling and/or coordinating care reviewing notes, lab data/ imaging , discussion with multidisciplinary team/ patient  /family. Risks, benefits, alternatives  discussed in detail.
GLOBAL ISSUE/BEST PRACTICE:      PROBLEM: HOB elevation:   y            PROBLEM: Stress ulcer proph:    na                      PROBLEM: VTE prophylaxis:      Lvnx 40 (4/8)   PROBLEM: Glycemic control:    na  PROBLEM: Nutrition:    dash (4/8)   PROBLEM: Advanced directive: na     PROBLEM: Allergies:  na    MEDICATIONS  PATIENT  BEKA WU 4/8/2019 ADMISSION [facility] VS DR MEGAN ARREOLA       ANTIBIO  azithro (4/8)      DVT P   Lvnx 40 (4/8)     HYPERTENSION  Lisinopril 10 (4/8)     COPD ex   DUONEB.4 (4/8)   spiriva (4/8)   symbicort (4/11)   Solumed 40.4 (4/8) ch solumed 40 (4/9)       PROBLEM  LIST  BEKA WU 4/8/2019 ADMISSION [facility] VS DR MEGAN ARREOLA    ACUTE COMBINED HYPOXIC HYPERCAPNIC RESP FAILURE POA 4/8/2019 4/8/2019 4p bpap 12.5..3 738/58/81     COPD   On BD steroids     INFECTION   4/10 RVP n 4/10 flu ab n rsv n     DYSPNEA   4/9 V duplex n   4/9/2019 echo ef 55% dd1 ivc resp variation over 50%     CAD  4/8/2019 Tr 1 n     ASSESSMENT RECOMMENDATIONS PATIENT     BEKA WU 4/8/2019 ADMISSION [facility] VS DR MEGAN ARREOLA       ACUTE COMBINED HYPOXIC HYPERCAPNIC RESP FAILURE POA 4/8/2019 4/8/2019 4p bpap 12.5..3 738/58/81   Monitor po abg Target po 90-95%    COPD   4/8/2019 Plan taper steroids 4/9 4/11/2019 added symbicrt     CAD  4/8/2019 Tr 1 n   Monitor enz    RULE OUT   CHF   4/8/2019 bnp 486   Good ef dd1 noted            TIME SPENT Over 25 minutes aggregate care time spent on encounter; activities included   direct patient care, counseling and/or coordinating care reviewing notes, lab data/ imaging , discussion with multidisciplinary team/ patient  /family. Risks, benefits, alternatives  discussed in detail.
73f with history of emphysema  with acute respiratory failure

## 2019-04-16 NOTE — PROGRESS NOTE ADULT - PROBLEM SELECTOR PLAN 1
lung exam continues to improve and remains  afebrile. no distress. o2 drops below 88 on RA with exertion and would benefit from home oxygen   c/w steroids as per pulnasim morales   pulmonary consult appreciated  plan to discharge tomorrow
c/w steroids  duonebs   pulmonary consult appreciated  o2 requirements improving
c/w steroids  nebs. will need to be converted on inhalers as and counseled on use. is not on inhalers at home   pulmonary consult appreciated  is off bipap now, but still requiring o2 and has significant wheeze
exam continues to improve and remains  afebrile. no distress. o2 drops below 88 on RA with exertion and would benefit from home oxygen   c/w steroids as per pulansim morales   pulmonary consult appreciated  plan to discharge today
pt still with sig wheeze but afebrile and in no distress   c/w steroids  durosanna   pulmonary consult appreciated  o2 requirements improving
steroids  nebs. will need to be converted on inhalers as and counseled on use. is not on inhalers at home   pulmonary consult appreciated  is off bipap now
wheeze imrpoved today and remains  afebrile. no distress. may need home 02   c/w steroids  andrew   pulmonary consult appreciated  will check o2 sats at rest and with ambulation
pt still with sig wheeze but afebrile and in no distress. may need home 02   c/w steroids  andrew   pulmonary consult appreciated  o2 requirements improving. 91 on RA at rest

## 2019-04-16 NOTE — PROGRESS NOTE ADULT - PROBLEM SELECTOR PROBLEM 5
Lower extremity edema

## 2019-04-16 NOTE — PROGRESS NOTE ADULT - PROBLEM SELECTOR PROBLEM 3
Acute on chronic respiratory failure with hypoxia and hypercapnia

## 2019-04-16 NOTE — PROGRESS NOTE ADULT - PROBLEM SELECTOR PLAN 5
pt reports a hx of lymphedema and reports they are more swollen than usual. compression stockings and scds  pt asking to stop lasix
improving with elevation   pt asking to stop lasix  will try compression stocking
compression stockings and scds  pt asking to stop lasix
improving with elevation   pt asking to stop lasix  will try compression stocking
pt reports a hx of lymphedema and reports they are more swollen than usual. compression stockings and scds  consider lasix
pt reports a hx of lymphedema and reports they are more swollen than usual. compression stockings and scds  pt agreeing to lasix. 40 mg
pt reports a hx of lymphedema and reports they are more swollen than usual. compression stockings and scds  pt agreeing to lasix. 40 mg iv daily
pt reports a hx of lymphedema and reports they are more swollen than usual. compression stockings and scds  pt refusing  lasix for now

## 2019-04-17 ENCOUNTER — MOBILE ON CALL (OUTPATIENT)
Age: 74
End: 2019-04-17

## 2019-04-18 ENCOUNTER — MEDICATION RENEWAL (OUTPATIENT)
Age: 74
End: 2019-04-18

## 2019-04-18 PROBLEM — I10 ESSENTIAL (PRIMARY) HYPERTENSION: Chronic | Status: ACTIVE | Noted: 2019-04-08

## 2019-04-24 DIAGNOSIS — J44.1 CHRONIC OBSTRUCTIVE PULMONARY DISEASE WITH (ACUTE) EXACERBATION: ICD-10-CM

## 2019-04-24 DIAGNOSIS — R60.0 LOCALIZED EDEMA: ICD-10-CM

## 2019-04-24 DIAGNOSIS — E66.01 MORBID (SEVERE) OBESITY DUE TO EXCESS CALORIES: ICD-10-CM

## 2019-04-24 DIAGNOSIS — I10 ESSENTIAL (PRIMARY) HYPERTENSION: ICD-10-CM

## 2019-04-24 DIAGNOSIS — J96.22 ACUTE AND CHRONIC RESPIRATORY FAILURE WITH HYPERCAPNIA: ICD-10-CM

## 2019-04-24 DIAGNOSIS — I50.22 CHRONIC SYSTOLIC (CONGESTIVE) HEART FAILURE: ICD-10-CM

## 2019-04-24 DIAGNOSIS — J96.21 ACUTE AND CHRONIC RESPIRATORY FAILURE WITH HYPOXIA: ICD-10-CM

## 2019-04-24 DIAGNOSIS — F17.210 NICOTINE DEPENDENCE, CIGARETTES, UNCOMPLICATED: ICD-10-CM

## 2019-04-24 DIAGNOSIS — B35.1 TINEA UNGUIUM: ICD-10-CM

## 2019-04-24 DIAGNOSIS — R06.02 SHORTNESS OF BREATH: ICD-10-CM

## 2019-04-24 DIAGNOSIS — I11.0 HYPERTENSIVE HEART DISEASE WITH HEART FAILURE: ICD-10-CM

## 2019-05-01 ENCOUNTER — APPOINTMENT (OUTPATIENT)
Dept: FAMILY MEDICINE | Facility: CLINIC | Age: 74
End: 2019-05-01
Payer: MEDICARE

## 2019-05-01 VITALS
RESPIRATION RATE: 22 BRPM | HEART RATE: 123 BPM | OXYGEN SATURATION: 95 % | HEIGHT: 62 IN | SYSTOLIC BLOOD PRESSURE: 124 MMHG | BODY MASS INDEX: 53.92 KG/M2 | WEIGHT: 293 LBS | DIASTOLIC BLOOD PRESSURE: 78 MMHG

## 2019-05-01 DIAGNOSIS — Z87.09 PERSONAL HISTORY OF OTHER DISEASES OF THE RESPIRATORY SYSTEM: ICD-10-CM

## 2019-05-01 DIAGNOSIS — Z09 ENCOUNTER FOR FOLLOW-UP EXAMINATION AFTER COMPLETED TREATMENT FOR CONDITIONS OTHER THAN MALIGNANT NEOPLASM: ICD-10-CM

## 2019-05-01 PROCEDURE — 99496 TRANSJ CARE MGMT HIGH F2F 7D: CPT | Mod: 25

## 2019-05-01 PROCEDURE — 36415 COLL VENOUS BLD VENIPUNCTURE: CPT

## 2019-05-01 RX ORDER — AMLODIPINE BESYLATE 10 MG/1
10 TABLET ORAL
Qty: 90 | Refills: 1 | Status: DISCONTINUED | COMMUNITY
Start: 2018-12-26 | End: 2019-05-01

## 2019-05-01 RX ORDER — PREDNISONE 20 MG/1
20 TABLET ORAL
Qty: 5 | Refills: 0 | Status: COMPLETED | COMMUNITY
Start: 2019-01-09 | End: 2019-05-01

## 2019-05-01 RX ORDER — CEFUROXIME AXETIL 500 MG/1
500 TABLET ORAL
Qty: 20 | Refills: 0 | Status: COMPLETED | COMMUNITY
Start: 2019-01-09 | End: 2019-05-01

## 2019-05-01 RX ORDER — ALBUTEROL SULFATE 90 UG/1
108 (90 BASE) AEROSOL, METERED RESPIRATORY (INHALATION)
Qty: 1 | Refills: 0 | Status: ACTIVE | COMMUNITY
Start: 2019-05-01 | End: 1900-01-01

## 2019-05-01 RX ORDER — HYDROCHLOROTHIAZIDE 25 MG/1
25 TABLET ORAL
Qty: 90 | Refills: 0 | Status: DISCONTINUED | COMMUNITY
Start: 2018-01-29 | End: 2019-05-01

## 2019-05-01 RX ORDER — HYDROCODONE BITARTRATE AND HOMATROPINE METHYLBROMIDE 5; 1.5 MG/5ML; MG/5ML
5-1.5 SOLUTION ORAL
Qty: 120 | Refills: 0 | Status: COMPLETED | COMMUNITY
Start: 2019-01-09 | End: 2019-05-01

## 2019-05-01 RX ORDER — CYCLOBENZAPRINE HYDROCHLORIDE 5 MG/1
5 TABLET, FILM COATED ORAL
Qty: 30 | Refills: 0 | Status: COMPLETED | COMMUNITY
Start: 2018-01-29 | End: 2019-05-01

## 2019-05-01 RX ORDER — FLUTICASONE PROPIONATE AND SALMETEROL 250; 50 UG/1; UG/1
250-50 POWDER RESPIRATORY (INHALATION)
Qty: 1 | Refills: 2 | Status: ACTIVE | COMMUNITY
Start: 2019-05-01 | End: 1900-01-01

## 2019-05-01 NOTE — PHYSICAL EXAM
[Well Nourished] : well nourished [Normal Oropharynx] : the oropharynx was normal [Regular Rhythm] : with a regular rhythm [Normal S1, S2] : normal S1 and S2 [Soft] : abdomen soft [Normal Insight/Judgement] : insight and judgment were intact [de-identified] : + lymphadema bilateral lower extremities, walks with rolling walker

## 2019-05-01 NOTE — HISTORY OF PRESENT ILLNESS
[de-identified] : 73 year old female is here for a followup visit for hospital admission for hypoxia and acute respiratory failure.\par patient discharged 7 days ago. \par home on home oxygen 3L\par

## 2019-05-01 NOTE — ASSESSMENT
[FreeTextEntry1] : Reviewed hospital course\par patient admitted for 10 days for respiratory failure\par added advair\par on home o2 3L\par \par need pulmonologist\par \par The patient has a diagnosis of hypertension. .  The diagnosis was discussed with patient and need for medication compliance and possible side affects and risks of noncompliance. Patient was told to adhere to a low salt diet and try to incorporate exercise daily.\par stable\par \par

## 2019-05-01 NOTE — REVIEW OF SYSTEMS
[Wheezing] : wheezing [Cough] : cough [Joint Pain] : joint pain [Muscle Pain] : muscle pain [Negative] : Heme/Lymph [FreeTextEntry6] : chronic in nature [FreeTextEntry9] : leg pain

## 2019-05-01 NOTE — HEALTH RISK ASSESSMENT
[] : No [No falls in past year] : Patient reported no falls in the past year [QHC7Toshm] : 0 [0] : 2) Feeling down, depressed, or hopeless: Not at all (0)

## 2019-06-06 ENCOUNTER — RX RENEWAL (OUTPATIENT)
Age: 74
End: 2019-06-06

## 2019-06-06 RX ORDER — IPRATROPIUM BROMIDE 17 UG/1
17 AEROSOL, METERED RESPIRATORY (INHALATION) 4 TIMES DAILY
Qty: 1 | Refills: 0 | Status: ACTIVE | COMMUNITY
Start: 2019-04-17 | End: 1900-01-01

## 2019-06-12 ENCOUNTER — APPOINTMENT (OUTPATIENT)
Dept: INTERNAL MEDICINE | Facility: CLINIC | Age: 74
End: 2019-06-12
Payer: MEDICARE

## 2019-06-12 VITALS
OXYGEN SATURATION: 92 % | HEIGHT: 62 IN | SYSTOLIC BLOOD PRESSURE: 140 MMHG | WEIGHT: 293 LBS | DIASTOLIC BLOOD PRESSURE: 73 MMHG | HEART RATE: 98 BPM | BODY MASS INDEX: 53.92 KG/M2

## 2019-06-12 PROCEDURE — 99204 OFFICE O/P NEW MOD 45 MIN: CPT | Mod: 25

## 2019-06-12 PROCEDURE — 94010 BREATHING CAPACITY TEST: CPT

## 2019-06-12 RX ORDER — ALBUTEROL SULFATE 90 UG/1
108 (90 BASE) INHALANT RESPIRATORY (INHALATION)
Qty: 1 | Refills: 3 | Status: ACTIVE | COMMUNITY
Start: 2019-06-12 | End: 1900-01-01

## 2019-06-13 RX ORDER — ACLIDINIUM BROMIDE 400 UG/1
400 POWDER, METERED RESPIRATORY (INHALATION)
Qty: 90 | Refills: 0 | Status: ACTIVE | COMMUNITY
Start: 2019-06-13 | End: 1900-01-01

## 2019-06-13 RX ORDER — UMECLIDINIUM 62.5 UG/1
62.5 AEROSOL, POWDER ORAL DAILY
Qty: 1 | Refills: 5 | Status: ACTIVE | COMMUNITY
Start: 2019-06-13 | End: 1900-01-01

## 2019-07-09 ENCOUNTER — RX RENEWAL (OUTPATIENT)
Age: 74
End: 2019-07-09

## 2019-07-11 ENCOUNTER — RX RENEWAL (OUTPATIENT)
Age: 74
End: 2019-07-11

## 2019-07-11 VITALS — HEIGHT: 62 IN | WEIGHT: 293 LBS | BODY MASS INDEX: 53.92 KG/M2

## 2019-07-11 DIAGNOSIS — I89.0 LYMPHEDEMA, NOT ELSEWHERE CLASSIFIED: ICD-10-CM

## 2019-07-11 DIAGNOSIS — Z80.1 FAMILY HISTORY OF MALIGNANT NEOPLASM OF TRACHEA, BRONCHUS AND LUNG: ICD-10-CM

## 2019-07-11 DIAGNOSIS — F17.200 NICOTINE DEPENDENCE, UNSPECIFIED, UNCOMPLICATED: ICD-10-CM

## 2019-07-11 DIAGNOSIS — Z87.891 PERSONAL HISTORY OF NICOTINE DEPENDENCE: ICD-10-CM

## 2019-07-11 DIAGNOSIS — Z12.2 ENCOUNTER FOR SCREENING FOR MALIGNANT NEOPLASM OF RESPIRATORY ORGANS: ICD-10-CM

## 2019-07-12 ENCOUNTER — OUTPATIENT (OUTPATIENT)
Dept: OUTPATIENT SERVICES | Facility: HOSPITAL | Age: 74
LOS: 1 days | End: 2019-07-12
Payer: MEDICARE

## 2019-07-12 ENCOUNTER — APPOINTMENT (OUTPATIENT)
Dept: CT IMAGING | Facility: CLINIC | Age: 74
End: 2019-07-12
Payer: MEDICARE

## 2019-07-12 DIAGNOSIS — Z00.8 ENCOUNTER FOR OTHER GENERAL EXAMINATION: ICD-10-CM

## 2019-07-12 PROCEDURE — G0297: CPT

## 2019-07-12 PROCEDURE — G0297: CPT | Mod: 26

## 2019-07-16 ENCOUNTER — APPOINTMENT (OUTPATIENT)
Dept: INTERNAL MEDICINE | Facility: CLINIC | Age: 74
End: 2019-07-16
Payer: MEDICARE

## 2019-07-16 VITALS
HEART RATE: 100 BPM | SYSTOLIC BLOOD PRESSURE: 143 MMHG | OXYGEN SATURATION: 91 % | DIASTOLIC BLOOD PRESSURE: 78 MMHG | HEIGHT: 62 IN

## 2019-07-16 VITALS — WEIGHT: 293 LBS | BODY MASS INDEX: 54.69 KG/M2

## 2019-07-16 PROCEDURE — 99214 OFFICE O/P EST MOD 30 MIN: CPT

## 2019-08-04 ENCOUNTER — RX RENEWAL (OUTPATIENT)
Age: 74
End: 2019-08-04

## 2019-10-21 ENCOUNTER — APPOINTMENT (OUTPATIENT)
Dept: FAMILY MEDICINE | Facility: CLINIC | Age: 74
End: 2019-10-21

## 2019-10-30 ENCOUNTER — APPOINTMENT (OUTPATIENT)
Dept: FAMILY MEDICINE | Facility: CLINIC | Age: 74
End: 2019-10-30
Payer: MEDICARE

## 2019-10-30 VITALS
WEIGHT: 293 LBS | BODY MASS INDEX: 53.92 KG/M2 | DIASTOLIC BLOOD PRESSURE: 76 MMHG | HEIGHT: 62 IN | SYSTOLIC BLOOD PRESSURE: 138 MMHG | OXYGEN SATURATION: 94 % | TEMPERATURE: 98.2 F | HEART RATE: 93 BPM

## 2019-10-30 DIAGNOSIS — E66.01 MORBID (SEVERE) OBESITY DUE TO EXCESS CALORIES: ICD-10-CM

## 2019-10-30 DIAGNOSIS — M79.671 PAIN IN RIGHT FOOT: ICD-10-CM

## 2019-10-30 DIAGNOSIS — M54.9 DORSALGIA, UNSPECIFIED: ICD-10-CM

## 2019-10-30 DIAGNOSIS — M79.672 PAIN IN RIGHT FOOT: ICD-10-CM

## 2019-10-30 PROCEDURE — 99214 OFFICE O/P EST MOD 30 MIN: CPT

## 2019-10-30 NOTE — HISTORY OF PRESENT ILLNESS
[de-identified] : 73 year old female is here for a followup visit. Patient is here for medication renewals and for blood work discussion. Medications and allergies were reviewed and assessed.  There has been no new medications since the last visit. Patient is feeling well with no active changes or issues since Her last visit.\par \par \par

## 2019-10-30 NOTE — PHYSICAL EXAM
[Well Nourished] : well nourished [Normal Oropharynx] : the oropharynx was normal [Regular Rhythm] : with a regular rhythm [Normal S1, S2] : normal S1 and S2 [Soft] : abdomen soft [Normal Insight/Judgement] : insight and judgment were intact [de-identified] : + lymphadema bilateral lower extremities, walks with rolling walker

## 2019-10-30 NOTE — REVIEW OF SYSTEMS
[Wheezing] : wheezing [Cough] : cough [Joint Pain] : joint pain [Muscle Pain] : muscle pain [Negative] : Heme/Lymph [FreeTextEntry6] : chronic in nature [FreeTextEntry9] : leg pain, foot pain

## 2019-10-30 NOTE — HEALTH RISK ASSESSMENT
[] : Yes [No falls in past year] : Patient reported no falls in the past year [0] : 2) Feeling down, depressed, or hopeless: Not at all (0) [MLP2Iwcib] : 0

## 2019-11-25 ENCOUNTER — RX RENEWAL (OUTPATIENT)
Age: 74
End: 2019-11-25

## 2020-01-11 ENCOUNTER — RX RENEWAL (OUTPATIENT)
Age: 75
End: 2020-01-11

## 2020-01-12 ENCOUNTER — RX RENEWAL (OUTPATIENT)
Age: 75
End: 2020-01-12

## 2020-01-12 RX ORDER — ZAFIRLUKAST 10 MG/1
10 TABLET, COATED ORAL
Qty: 180 | Refills: 0 | Status: ACTIVE | COMMUNITY
Start: 2019-04-18 | End: 1900-01-01

## 2020-03-18 ENCOUNTER — APPOINTMENT (OUTPATIENT)
Dept: FAMILY MEDICINE | Facility: CLINIC | Age: 75
End: 2020-03-18

## 2020-03-18 RX ORDER — HYDROCODONE BITARTRATE AND ACETAMINOPHEN 5; 325 MG/1; MG/1
5-325 TABLET ORAL
Qty: 60 | Refills: 0 | Status: ACTIVE | COMMUNITY
Start: 2017-01-28 | End: 1900-01-01

## 2020-04-27 ENCOUNTER — APPOINTMENT (OUTPATIENT)
Dept: FAMILY MEDICINE | Facility: CLINIC | Age: 75
End: 2020-04-27

## 2020-04-28 ENCOUNTER — APPOINTMENT (OUTPATIENT)
Dept: FAMILY MEDICINE | Facility: CLINIC | Age: 75
End: 2020-04-28

## 2020-05-18 ENCOUNTER — RX RENEWAL (OUTPATIENT)
Age: 75
End: 2020-05-18

## 2020-06-10 ENCOUNTER — RX RENEWAL (OUTPATIENT)
Age: 75
End: 2020-06-10

## 2020-07-23 NOTE — PATIENT PROFILE ADULT - NSPROPOAPRESSUREINJURY_GEN_A_NUR
Per request:  Referred to [facility] at [address]. [address] [address],[address] [facility], [facility]  Phone: [phone]  Fax: [phone]
Pt stated the referral to the nutritionist is has been put on hold according that office but the pt was given another office that is taking pt's at this time with the fax number as [phone] and ph number is [phone]. Ms Bisi Taveras stated that her office notes along with the referral needs to be faxed to that office.  Please contact the pt for clarification or any questions when available
no

## 2020-08-13 ENCOUNTER — APPOINTMENT (OUTPATIENT)
Dept: FAMILY MEDICINE | Facility: CLINIC | Age: 75
End: 2020-08-13
Payer: MEDICARE

## 2020-08-13 DIAGNOSIS — J44.9 CHRONIC OBSTRUCTIVE PULMONARY DISEASE, UNSPECIFIED: ICD-10-CM

## 2020-08-13 DIAGNOSIS — I10 ESSENTIAL (PRIMARY) HYPERTENSION: ICD-10-CM

## 2020-08-13 PROCEDURE — 99442: CPT

## 2020-08-13 RX ORDER — TIOTROPIUM BROMIDE 18 UG/1
18 CAPSULE ORAL; RESPIRATORY (INHALATION) DAILY
Qty: 90 | Refills: 0 | Status: ACTIVE | COMMUNITY
Start: 2019-06-12 | End: 1900-01-01

## 2020-08-18 RX ORDER — LEVOCETIRIZINE DIHYDROCHLORIDE 5 MG/1
5 TABLET ORAL
Qty: 90 | Refills: 0 | Status: ACTIVE | COMMUNITY
Start: 2020-03-18 | End: 1900-01-01

## 2020-11-06 ENCOUNTER — RX RENEWAL (OUTPATIENT)
Age: 75
End: 2020-11-06

## 2020-11-06 RX ORDER — CYCLOBENZAPRINE HYDROCHLORIDE 10 MG/1
10 TABLET, FILM COATED ORAL
Qty: 30 | Refills: 0 | Status: ACTIVE | COMMUNITY
Start: 2019-05-13 | End: 1900-01-01

## 2020-12-21 PROBLEM — J06.9 ACUTE URI: Status: RESOLVED | Noted: 2019-01-09 | Resolved: 2020-12-21

## 2021-01-15 ENCOUNTER — RX RENEWAL (OUTPATIENT)
Age: 76
End: 2021-01-15

## 2021-01-15 RX ORDER — LISINOPRIL 10 MG/1
10 TABLET ORAL
Qty: 90 | Refills: 1 | Status: ACTIVE | COMMUNITY
Start: 2019-05-01 | End: 1900-01-01

## 2021-02-03 ENCOUNTER — INPATIENT (INPATIENT)
Facility: HOSPITAL | Age: 76
LOS: 5 days | Discharge: NURSING, REHAB OR LONG-TERM CARE | End: 2021-02-09
Attending: INTERNAL MEDICINE | Admitting: INTERNAL MEDICINE
Payer: MEDICARE

## 2021-02-03 VITALS
DIASTOLIC BLOOD PRESSURE: 61 MMHG | RESPIRATION RATE: 20 BRPM | HEART RATE: 114 BPM | TEMPERATURE: 98 F | SYSTOLIC BLOOD PRESSURE: 90 MMHG | HEIGHT: 62 IN | WEIGHT: 293 LBS

## 2021-02-03 DIAGNOSIS — I10 ESSENTIAL (PRIMARY) HYPERTENSION: ICD-10-CM

## 2021-02-03 DIAGNOSIS — J44.1 CHRONIC OBSTRUCTIVE PULMONARY DISEASE WITH (ACUTE) EXACERBATION: ICD-10-CM

## 2021-02-03 DIAGNOSIS — I89.0 LYMPHEDEMA, NOT ELSEWHERE CLASSIFIED: ICD-10-CM

## 2021-02-03 DIAGNOSIS — N17.9 ACUTE KIDNEY FAILURE, UNSPECIFIED: ICD-10-CM

## 2021-02-03 LAB
ALBUMIN SERPL ELPH-MCNC: 3 G/DL — LOW (ref 3.3–5)
ALP SERPL-CCNC: 79 U/L — SIGNIFICANT CHANGE UP (ref 40–120)
ALT FLD-CCNC: 42 U/L — SIGNIFICANT CHANGE UP (ref 12–78)
ANION GAP SERPL CALC-SCNC: 8 MMOL/L — SIGNIFICANT CHANGE UP (ref 5–17)
APAP SERPL-MCNC: < 2 UG/ML (ref 10–30)
APPEARANCE UR: CLEAR — SIGNIFICANT CHANGE UP
APTT BLD: 27.1 SEC — LOW (ref 27.5–35.5)
AST SERPL-CCNC: 28 U/L — SIGNIFICANT CHANGE UP (ref 15–37)
BACTERIA # UR AUTO: ABNORMAL
BASE EXCESS BLDA CALC-SCNC: -4.6 MMOL/L — LOW (ref -2–2)
BASOPHILS # BLD AUTO: 0.05 K/UL — SIGNIFICANT CHANGE UP (ref 0–0.2)
BASOPHILS NFR BLD AUTO: 0.5 % — SIGNIFICANT CHANGE UP (ref 0–2)
BILIRUB SERPL-MCNC: 0.8 MG/DL — SIGNIFICANT CHANGE UP (ref 0.2–1.2)
BILIRUB UR-MCNC: ABNORMAL
BLOOD GAS COMMENTS: SIGNIFICANT CHANGE UP
BLOOD GAS COMMENTS: SIGNIFICANT CHANGE UP
BLOOD GAS SOURCE: SIGNIFICANT CHANGE UP
BUN SERPL-MCNC: 102 MG/DL — HIGH (ref 7–23)
CALCIUM SERPL-MCNC: 9.4 MG/DL — SIGNIFICANT CHANGE UP (ref 8.5–10.1)
CHLORIDE SERPL-SCNC: 111 MMOL/L — HIGH (ref 96–108)
CO2 SERPL-SCNC: 23 MMOL/L — SIGNIFICANT CHANGE UP (ref 22–31)
COLOR SPEC: ABNORMAL
COMMENT - URINE: SIGNIFICANT CHANGE UP
CREAT SERPL-MCNC: 2.29 MG/DL — HIGH (ref 0.5–1.3)
D DIMER BLD IA.RAPID-MCNC: 1020 NG/ML DDU — HIGH
DIFF PNL FLD: NEGATIVE — SIGNIFICANT CHANGE UP
EOSINOPHIL # BLD AUTO: 0.17 K/UL — SIGNIFICANT CHANGE UP (ref 0–0.5)
EOSINOPHIL NFR BLD AUTO: 1.6 % — SIGNIFICANT CHANGE UP (ref 0–6)
EPI CELLS # UR: SIGNIFICANT CHANGE UP
ETHANOL SERPL-MCNC: <10 MG/DL — SIGNIFICANT CHANGE UP (ref 0–10)
GLUCOSE SERPL-MCNC: 124 MG/DL — HIGH (ref 70–99)
GLUCOSE UR QL: NEGATIVE MG/DL — SIGNIFICANT CHANGE UP
HCO3 BLDA-SCNC: 19 MMOL/L — LOW (ref 21–29)
HCT VFR BLD CALC: 41.2 % — SIGNIFICANT CHANGE UP (ref 34.5–45)
HGB BLD-MCNC: 12.8 G/DL — SIGNIFICANT CHANGE UP (ref 11.5–15.5)
HOROWITZ INDEX BLDA+IHG-RTO: 28 — SIGNIFICANT CHANGE UP
HYALINE CASTS # UR AUTO: ABNORMAL /LPF
IMM GRANULOCYTES NFR BLD AUTO: 1.3 % — SIGNIFICANT CHANGE UP (ref 0–1.5)
INR BLD: 1.24 RATIO — HIGH (ref 0.88–1.16)
KETONES UR-MCNC: ABNORMAL
LEUKOCYTE ESTERASE UR-ACNC: ABNORMAL
LYMPHOCYTES # BLD AUTO: 0.72 K/UL — LOW (ref 1–3.3)
LYMPHOCYTES # BLD AUTO: 6.9 % — LOW (ref 13–44)
MAGNESIUM SERPL-MCNC: 2.7 MG/DL — HIGH (ref 1.6–2.6)
MCHC RBC-ENTMCNC: 27.7 PG — SIGNIFICANT CHANGE UP (ref 27–34)
MCHC RBC-ENTMCNC: 31.1 GM/DL — LOW (ref 32–36)
MCV RBC AUTO: 89.2 FL — SIGNIFICANT CHANGE UP (ref 80–100)
MONOCYTES # BLD AUTO: 0.63 K/UL — SIGNIFICANT CHANGE UP (ref 0–0.9)
MONOCYTES NFR BLD AUTO: 6 % — SIGNIFICANT CHANGE UP (ref 2–14)
NEUTROPHILS # BLD AUTO: 8.79 K/UL — HIGH (ref 1.8–7.4)
NEUTROPHILS NFR BLD AUTO: 83.7 % — HIGH (ref 43–77)
NITRITE UR-MCNC: NEGATIVE — SIGNIFICANT CHANGE UP
NRBC # BLD: 0 /100 WBCS — SIGNIFICANT CHANGE UP (ref 0–0)
NT-PROBNP SERPL-SCNC: 962 PG/ML — HIGH (ref 0–125)
PCO2 BLDA: 32 MMHG — SIGNIFICANT CHANGE UP (ref 32–46)
PH BLD: 7.39 — SIGNIFICANT CHANGE UP (ref 7.35–7.45)
PH UR: 5 — SIGNIFICANT CHANGE UP (ref 5–8)
PLATELET # BLD AUTO: 191 K/UL — SIGNIFICANT CHANGE UP (ref 150–400)
PO2 BLDA: 77 MMHG — SIGNIFICANT CHANGE UP (ref 74–108)
POTASSIUM SERPL-MCNC: 4.6 MMOL/L — SIGNIFICANT CHANGE UP (ref 3.5–5.3)
POTASSIUM SERPL-SCNC: 4.6 MMOL/L — SIGNIFICANT CHANGE UP (ref 3.5–5.3)
PROT SERPL-MCNC: 7.1 GM/DL — SIGNIFICANT CHANGE UP (ref 6–8.3)
PROT UR-MCNC: 15 MG/DL
PROTHROM AB SERPL-ACNC: 14.2 SEC — HIGH (ref 10.6–13.6)
RBC # BLD: 4.62 M/UL — SIGNIFICANT CHANGE UP (ref 3.8–5.2)
RBC # FLD: 15.5 % — HIGH (ref 10.3–14.5)
RBC CASTS # UR COMP ASSIST: SIGNIFICANT CHANGE UP /HPF (ref 0–4)
SALICYLATES SERPL-MCNC: <1.7 MG/DL — LOW (ref 2.8–20)
SAO2 % BLDA: 96 % — SIGNIFICANT CHANGE UP (ref 92–96)
SARS-COV-2 IGG SERPL QL IA: NEGATIVE — SIGNIFICANT CHANGE UP
SARS-COV-2 IGM SERPL IA-ACNC: <0.1 INDEX — SIGNIFICANT CHANGE UP
SARS-COV-2 RNA SPEC QL NAA+PROBE: SIGNIFICANT CHANGE UP
SODIUM SERPL-SCNC: 142 MMOL/L — SIGNIFICANT CHANGE UP (ref 135–145)
SP GR SPEC: 1.02 — SIGNIFICANT CHANGE UP (ref 1.01–1.02)
TROPONIN I SERPL-MCNC: <.015 NG/ML — SIGNIFICANT CHANGE UP (ref 0.01–0.04)
UROBILINOGEN FLD QL: 4 MG/DL
WBC # BLD: 10.5 K/UL — SIGNIFICANT CHANGE UP (ref 3.8–10.5)
WBC # FLD AUTO: 10.5 K/UL — SIGNIFICANT CHANGE UP (ref 3.8–10.5)
WBC UR QL: SIGNIFICANT CHANGE UP

## 2021-02-03 PROCEDURE — 93970 EXTREMITY STUDY: CPT | Mod: 26

## 2021-02-03 PROCEDURE — 71045 X-RAY EXAM CHEST 1 VIEW: CPT | Mod: 26

## 2021-02-03 PROCEDURE — 99285 EMERGENCY DEPT VISIT HI MDM: CPT

## 2021-02-03 PROCEDURE — 99223 1ST HOSP IP/OBS HIGH 75: CPT

## 2021-02-03 PROCEDURE — 93010 ELECTROCARDIOGRAM REPORT: CPT

## 2021-02-03 PROCEDURE — 99222 1ST HOSP IP/OBS MODERATE 55: CPT

## 2021-02-03 PROCEDURE — 71275 CT ANGIOGRAPHY CHEST: CPT | Mod: 26,MH

## 2021-02-03 PROCEDURE — 70450 CT HEAD/BRAIN W/O DYE: CPT | Mod: 26,MA

## 2021-02-03 RX ORDER — ALBUTEROL 90 UG/1
1 AEROSOL, METERED ORAL EVERY 4 HOURS
Refills: 0 | Status: COMPLETED | OUTPATIENT
Start: 2021-02-03 | End: 2022-01-02

## 2021-02-03 RX ORDER — ACETAMINOPHEN 500 MG
975 TABLET ORAL ONCE
Refills: 0 | Status: COMPLETED | OUTPATIENT
Start: 2021-02-03 | End: 2021-02-03

## 2021-02-03 RX ORDER — ACETAMINOPHEN WITH CODEINE 300MG-30MG
1 TABLET ORAL EVERY 4 HOURS
Refills: 0 | Status: DISCONTINUED | OUTPATIENT
Start: 2021-02-03 | End: 2021-02-04

## 2021-02-03 RX ORDER — SODIUM CHLORIDE 9 MG/ML
1000 INJECTION INTRAMUSCULAR; INTRAVENOUS; SUBCUTANEOUS
Refills: 0 | Status: DISCONTINUED | OUTPATIENT
Start: 2021-02-03 | End: 2021-02-05

## 2021-02-03 RX ORDER — ERGOCALCIFEROL 1.25 MG/1
1 CAPSULE ORAL
Qty: 0 | Refills: 0 | DISCHARGE

## 2021-02-03 RX ORDER — ALBUTEROL 90 UG/1
1 AEROSOL, METERED ORAL EVERY 4 HOURS
Refills: 0 | Status: DISCONTINUED | OUTPATIENT
Start: 2021-02-03 | End: 2021-02-09

## 2021-02-03 RX ORDER — HEPARIN SODIUM 5000 [USP'U]/ML
5000 INJECTION INTRAVENOUS; SUBCUTANEOUS EVERY 12 HOURS
Refills: 0 | Status: DISCONTINUED | OUTPATIENT
Start: 2021-02-03 | End: 2021-02-09

## 2021-02-03 RX ORDER — TIOTROPIUM BROMIDE 18 UG/1
1 CAPSULE ORAL; RESPIRATORY (INHALATION) DAILY
Refills: 0 | Status: DISCONTINUED | OUTPATIENT
Start: 2021-02-03 | End: 2021-02-09

## 2021-02-03 RX ORDER — GABAPENTIN 400 MG/1
1 CAPSULE ORAL
Qty: 0 | Refills: 0 | DISCHARGE

## 2021-02-03 RX ORDER — LISINOPRIL 2.5 MG/1
1 TABLET ORAL
Qty: 30 | Refills: 0

## 2021-02-03 RX ORDER — ASPIRIN/CALCIUM CARB/MAGNESIUM 324 MG
162 TABLET ORAL ONCE
Refills: 0 | Status: COMPLETED | OUTPATIENT
Start: 2021-02-03 | End: 2021-02-03

## 2021-02-03 RX ORDER — IPRATROPIUM/ALBUTEROL SULFATE 18-103MCG
3 AEROSOL WITH ADAPTER (GRAM) INHALATION EVERY 6 HOURS
Refills: 0 | Status: DISCONTINUED | OUTPATIENT
Start: 2021-02-03 | End: 2021-02-03

## 2021-02-03 RX ADMIN — Medication 40 MILLIGRAM(S): at 17:46

## 2021-02-03 RX ADMIN — Medication 1 TABLET(S): at 12:16

## 2021-02-03 RX ADMIN — HEPARIN SODIUM 5000 UNIT(S): 5000 INJECTION INTRAVENOUS; SUBCUTANEOUS at 17:47

## 2021-02-03 RX ADMIN — Medication 40 MILLIGRAM(S): at 12:16

## 2021-02-03 RX ADMIN — SODIUM CHLORIDE 80 MILLILITER(S): 9 INJECTION INTRAMUSCULAR; INTRAVENOUS; SUBCUTANEOUS at 17:50

## 2021-02-03 RX ADMIN — ALBUTEROL 1 PUFF(S): 90 AEROSOL, METERED ORAL at 21:08

## 2021-02-03 RX ADMIN — Medication 162 MILLIGRAM(S): at 09:00

## 2021-02-03 NOTE — H&P ADULT - HISTORY OF PRESENT ILLNESS
76 yo F hx COPD on home O2, HTN, HLD, chronic lymphedema BIBA for complaints of generalized weakness and shortness of breath. Patient lives with sister who was on her way to work and did not feel comfortable leaving patient alone at home today. Patient reports increasing SOB and weakness over the last few weeks. Increased swelling and pain to lower extremities. Denies focal numbness/tingling. No chest pain. No cough.

## 2021-02-03 NOTE — ED ADULT NURSE NOTE - NSIMPLEMENTINTERV_GEN_ALL_ED
Implemented All Fall Risk Interventions:  [place] to call system. Call bell, personal items and telephone within reach. Instruct patient to call for assistance. Room bathroom lighting operational. Non-slip footwear when patient is off stretcher. Physically safe environment: no spills, clutter or unnecessary equipment. Stretcher in lowest position, wheels locked, appropriate side rails in place. Provide visual cue, wrist band, yellow gown, etc. Monitor gait and stability. Monitor for mental status changes and reorient to person, place, and time. Review medications for side effects contributing to fall risk. Reinforce activity limits and safety measures with patient and family.

## 2021-02-03 NOTE — ED PROVIDER NOTE - PHYSICAL EXAMINATION
VITALS: reviewed  GEN: NAD, A & O x 4  HEAD/EYES: NCAT, EOMI, anicteric sclerae  ENT: mucus membranes moist, oropharynx WNL, trachea midline  RESP: decreased breath sounds b/l, chest wall nontender and atraumatic  CV: heart with reg rhythm S1, S2, distal pulses intact and symmetric bilaterally  ABDOMEN: normoactive bowel sounds, soft, nondistended, nontender, no palpable masses  : no CVAT  MSK: lymphedema b/l LE. the back is without midline or lateral tenderness, there is no spinal deformity or stepoff and the back is ranged painlessly. the neck has no midline tenderness, deformity, or stepoff, and is ranged painlessly.  SKIN: warm, dry, no rash, no bruising, no cyanosis. color appropriate for ethnicity  NEURO: alert, mentating appropriately, no facial asymmetry. gross sensation, motor, coordination are intact  PSYCH: Affect appropriate

## 2021-02-03 NOTE — PHYSICAL THERAPY INITIAL EVALUATION ADULT - ADDITIONAL COMMENTS
Pt states she lives in an apartment no stairs to negotiate. Pt lives with sister who works. Pt does not have an aide. Pt has a rolling walker and walks very short distances to the bathroom. Pt states it takes her a while to get up therefore she does end up sitting in urine for a while which increases risk for skin breakdown. Pt presents with IAD and MAD, and lymphedema. Pt is followed up by home care nurse every 2 days for dressing change. Pt's sister assists with ADLs when available.

## 2021-02-03 NOTE — ED ADULT TRIAGE NOTE - CHIEF COMPLAINT QUOTE
SOB x 1 year, sister called EMS felt she would fall today with weakness while she goes to work, non compliant with meds, legs grossly edematous H/O COPD, HTN

## 2021-02-03 NOTE — H&P ADULT - ASSESSMENT
75 years old female with emphysema with short of breath with lymphedema ulcer bilateral         IMPROVE VTE Individual Risk Assessment          RISK                                                          Points  [  ] Previous VTE                                                3  [  ] Thrombophilia                                             2  [  ] Lower limb paralysis                                   2        (unable to hold up >15 seconds)    [  ] Current Cancer                                             2         (within 6 months)  [  ] Immobilization > 24 hrs                              1  [  ] ICU/CCU stay > 24 hours                             1  [  ] Age > 60                                                         1    IMPROVE VTE Score: 2

## 2021-02-03 NOTE — ED ADULT TRIAGE NOTE - TEMPERATURE IN CELSIUS (DEGREES C)
36.4 Skin Substitute Text: The defect edges were debeveled with a #15 scalpel blade.  Given the location of the defect, shape of the defect and the proximity to free margins a skin substitute graft was deemed most appropriate.  The graft material was trimmed to fit the size of the defect. The graft was then placed in the primary defect and oriented appropriately.

## 2021-02-03 NOTE — ED PROVIDER NOTE - NS ED ROS FT
CONST: no fevers, no chills, no trauma  EYES: no pain, no visual disturbances  ENT: no sore throat, no epistaxis, no rhinorrhea, no hearing changes  CV: no chest pain, no palpitations, no orthopnea, no extremity pain or swelling  RESP: + shortness of breath, no cough, no sputum, no pleurisy, no wheezing  ABD: no abdominal pain, no nausea, no vomiting, no diarrhea, no black or bloody stool  : no dysuria, no hematuria, no frequency, no urgency  MSK: no back pain, no neck pain, no extremity pain  NEURO: no headache, no sensory disturbances, no focal weakness, no dizziness  HEME: no easy bleeding or bruising  SKIN: no diaphoresis, no rash

## 2021-02-03 NOTE — H&P ADULT - NSHPREVIEWOFSYSTEMS_GEN_ALL_CORE

## 2021-02-03 NOTE — H&P ADULT - NSHPPHYSICALEXAM_GEN_ALL_CORE
ICU Vital Signs Last 24 Hrs  T(C): 36.4 (03 Feb 2021 07:22), Max: 36.4 (03 Feb 2021 07:22)  T(F): 97.6 (03 Feb 2021 07:22), Max: 97.6 (03 Feb 2021 07:22)  HR: 114 (03 Feb 2021 07:22) (114 - 114)  BP: 159/106 (03 Feb 2021 07:54) (90/61 - 159/106)  BP(mean): --  ABP: --  ABP(mean): --  RR: 20 (03 Feb 2021 07:22) (20 - 20)  SpO2: -- ICU Vital Signs Last 24 Hrs  T(C): 36.4 (03 Feb 2021 07:22), Max: 36.4 (03 Feb 2021 07:22)  T(F): 97.6 (03 Feb 2021 07:22), Max: 97.6 (03 Feb 2021 07:22)  HR: 114 (03 Feb 2021 07:22) (114 - 114)  BP: 159/106 (03 Feb 2021 07:54) (90/61 - 159/106)  BP(mean): --  ABP: --  ABP(mean): --  RR: 20 (03 Feb 2021 07:22) (20 - 20)  SpO2: --    GENERAL: NAD well-developed  HEAD:  Atraumatic, Normocephalic  EYES: EOMI, PERRLA, conjunctiva and sclera clear  ENMT: No tonsillar erythema, exudates, or enlargement; Moist mucous membranes, Good dentition, No lesions  NECK: Supple, No JVD, Normal thyroid  NERVOUS SYSTEM:  Alert & Oriented X3, Good concentration; Motor Strength 5/5 B/L upper and lower extremities; DTRs 2+ intact and symmetric  CHEST/LUNG: Clear to percussion bilaterally; No rales, rhonchi, wheezing, or rubs  HEART: Regular rate and rhythm; No murmurs, rubs, or gallops  ABDOMEN: Soft, Nontender, Nondistended; Bowel sounds present  EXTREMITIES:  2+ Peripheral Pulses, No clubbing, cyanosis, or POS lymphedema  LYMPH: No lymphadenopathy   SKIN: No rashes or lesions

## 2021-02-03 NOTE — ED ADULT NURSE NOTE - SKIN INTEGRITY
EMERGENCY DEPARTMENT HISTORY AND PHYSICAL EXAM 
 
 
Date: 1/29/2019 Patient Name: Felipe Mcmillan History of Presenting Illness Chief Complaint Patient presents with  Cough  Shortness of Breath History Provided By: Patient HPI: Felipe Mcmillan, 35 y.o. female with no PMHx, presents ambulatory to the ED with cc of ongoing productive cough of yellow sputum x 3 days. Pt states today the cough worsened with some blood tinged sputum as well as sore throat that is exacerbated with coughing. Pt states she had a fever at onset of sx's, however this has since resolved. She reports feeling SOB at home prior to ED arrival, but states this has also improved. Pt does note one episode of diarrhea today. Pt denies any tobacco use or tobacco exposure. She does not have an inhaler at home. Pt states she works in [employer] and has still been able to go to work despite sx's. Pt is otherwise healthy and denies any long standing illnesses or medications. She specifically denies any recent nausea, vomiting, abd pain, CP, lightheadedness, dizziness, numbness, weakness, tingling, BLE swelling, HA, heart palpitations, urinary sxs, changes in PO intake, melena, hematochezia, or congestion. Allergies: none PMHx: Significant for none PSHx: Significant for none Social Hx: - tobacco, + EtOH, - Illicit Drugs There are no other complaints, changes, or physical findings at this time. PCP: Ping Franco MD 
 
Current Outpatient Medications Medication Sig Dispense Refill  guaiFENesin-codeine (ROBITUSSIN AC) 100-10 mg/5 mL solution Take 5 mL by mouth three (3) times daily as needed for Cough. Max Daily Amount: 15 mL. 120 mL 0  
 azithromycin (ZITHROMAX Z-JENISE) 250 mg tablet As directed 6 Tab 0  
 benzonatate (TESSALON PERLES) 100 mg capsule Take 1 Cap by mouth three (3) times daily as needed for Cough for up to 7 days.  30 Cap 0  
  albuterol (PROVENTIL VENTOLIN) 2.5 mg /3 mL (0.083 %) nebulizer solution 3 mL by Nebulization route every four (4) hours as needed for Wheezing. 24 Each 0  
 albuterol (PROVENTIL HFA, VENTOLIN HFA, PROAIR HFA) 90 mcg/actuation inhaler Take 2 Puffs by inhalation every four (4) hours as needed for Wheezing. 1 Inhaler 0  
 ethinyl estradiol-etonogestrel (NUVARING) 0.12-0.015 mg/24 hr vaginal ring Use one ring weekly for 3 weeks. No ring week 4 then restart cycle 3 Device 2 Past History Past Medical History: 
History reviewed. No pertinent past medical history. Past Surgical History: 
History reviewed. No pertinent surgical history. Family History: 
Family History Problem Relation Age of Onset  Hypertension Mother Social History: 
Social History Tobacco Use  Smoking status: Never Smoker  Smokeless tobacco: Never Used Substance Use Topics  Alcohol use: Yes  Drug use: Not on file Allergies: Allergies Allergen Reactions  Milk Other (comments) NOT LACTOSE ALLERGY. SENSITIVE TO ONLY MILK Review of Systems Review of Systems Constitutional: Negative. Negative for appetite change, chills and fever (resolved). HENT: Positive for sore throat. Negative for congestion. Eyes: Negative. Respiratory: Positive for cough and shortness of breath. Cardiovascular: Negative. Negative for chest pain, palpitations and leg swelling. Gastrointestinal: Positive for diarrhea. Negative for abdominal pain, blood in stool, constipation, nausea and vomiting. Genitourinary: Negative. Negative for dysuria and frequency. Musculoskeletal: Negative. Skin: Negative. Neurological: Negative. Negative for dizziness, weakness, light-headedness, numbness and headaches. Psychiatric/Behavioral: Negative. All other systems reviewed and are negative. Physical Exam  
General appearance - overweight, well appearing, and in no distress Eyes - pupils equal and reactive, extraocular eye movements intact ENT - mucous membranes moist, pharynx erythematous, no tonsillar hypertrophy or exudate Neck - supple, no significant adenopathy; non-tender to palpation Chest - clear to auscultation, no wheezes, rales or rhonchi; non-tender to palpation Heart - normal rate and regular rhythm, S1 and S2 normal, no murmurs noted Abdomen - soft, nontender, nondistended, no masses or organomegaly Musculoskeletal - no joint tenderness, deformity or swelling; normal ROM Extremities - peripheral pulses normal, no pedal edema Skin - normal coloration and turgor, no rashes Neurological - alert, oriented x3, normal speech, no focal findings or movement disorder noted Diagnostic Study Results Labs - Recent Results (from the past 12 hour(s)) EKG, 12 LEAD, INITIAL Collection Time: 01/29/19 12:24 AM  
Result Value Ref Range Ventricular Rate 82 BPM  
 Atrial Rate 82 BPM  
 P-R Interval 140 ms QRS Duration 74 ms Q-T Interval 372 ms QTC Calculation (Bezet) 434 ms Calculated P Axis 57 degrees Calculated R Axis 20 degrees Calculated T Axis 67 degrees Diagnosis Normal sinus rhythm Possible Left atrial enlargement No previous ECGs available CBC WITH AUTOMATED DIFF Collection Time: 01/29/19 12:34 AM  
Result Value Ref Range WBC 11.9 (H) 3.6 - 11.0 K/uL  
 RBC 4.01 3.80 - 5.20 M/uL  
 HGB 12.1 11.5 - 16.0 g/dL HCT 37.2 35.0 - 47.0 % MCV 92.8 80.0 - 99.0 FL  
 MCH 30.2 26.0 - 34.0 PG  
 MCHC 32.5 30.0 - 36.5 g/dL  
 RDW 13.2 11.5 - 14.5 % PLATELET 898 994 - 695 K/uL MPV 10.4 8.9 - 12.9 FL  
 NRBC 0.0 0  WBC ABSOLUTE NRBC 0.00 0.00 - 0.01 K/uL NEUTROPHILS 43 32 - 75 % LYMPHOCYTES 47 12 - 49 % MONOCYTES 8 5 - 13 % EOSINOPHILS 1 0 - 7 % BASOPHILS 0 0 - 1 % IMMATURE GRANULOCYTES 0 0.0 - 0.5 % ABS. NEUTROPHILS 5.1 1.8 - 8.0 K/UL  
 ABS. LYMPHOCYTES 5.7 (H) 0.8 - 3.5 K/UL ABS. MONOCYTES 0.9 0.0 - 1.0 K/UL  
 ABS. EOSINOPHILS 0.2 0.0 - 0.4 K/UL  
 ABS. BASOPHILS 0.0 0.0 - 0.1 K/UL  
 ABS. IMM. GRANS. 0.0 0.00 - 0.04 K/UL  
 DF AUTOMATED METABOLIC PANEL, COMPREHENSIVE Collection Time: 01/29/19 12:34 AM  
Result Value Ref Range Sodium 137 136 - 145 mmol/L Potassium 4.0 3.5 - 5.1 mmol/L Chloride 102 97 - 108 mmol/L  
 CO2 27 21 - 32 mmol/L Anion gap 8 5 - 15 mmol/L Glucose 94 65 - 100 mg/dL BUN 15 6 - 20 MG/DL Creatinine 1.20 (H) 0.55 - 1.02 MG/DL  
 BUN/Creatinine ratio 13 12 - 20 GFR est AA >60 >60 ml/min/1.73m2 GFR est non-AA 53 (L) >60 ml/min/1.73m2 Calcium 8.4 (L) 8.5 - 10.1 MG/DL Bilirubin, total 0.3 0.2 - 1.0 MG/DL  
 ALT (SGPT) 14 12 - 78 U/L  
 AST (SGOT) 15 15 - 37 U/L Alk. phosphatase 63 45 - 117 U/L Protein, total 7.7 6.4 - 8.2 g/dL Albumin 3.3 (L) 3.5 - 5.0 g/dL Globulin 4.4 (H) 2.0 - 4.0 g/dL A-G Ratio 0.8 (L) 1.1 - 2.2 CK W/ REFLX CKMB Collection Time: 01/29/19 12:34 AM  
Result Value Ref Range  (H) 26 - 192 U/L  
TROPONIN I Collection Time: 01/29/19 12:34 AM  
Result Value Ref Range Troponin-I, Qt. <0.05 <0.05 ng/mL CK-MB,QUANT. Collection Time: 01/29/19 12:34 AM  
Result Value Ref Range CK - MB 1.8 <3.6 NG/ML  
 CK-MB Index 0.5 0 - 2.5 HCG URINE, QL. - POC Collection Time: 01/29/19  1:01 AM  
Result Value Ref Range Pregnancy test,urine (POC) NEGATIVE  NEG Radiologic Studies - CXR Results  (Last 48 hours) 01/29/19 0142  XR CHEST PA LAT Final result Impression:  IMPRESSION:   
Clear lungs. Narrative:  PA AND LATERAL CHEST RADIOGRAPHS: 1/29/2019 1:42 AM  
   
INDICATION: Cough, dyspnea. COMPARISON: None. TECHNIQUE: Frontal and lateral radiographs of the chest.  
   
FINDINGS:  
The lungs are clear. The central airways are patent. The heart size is normal.  
No pneumothorax or pleural effusion. Medical Decision Making I am the first provider for this patient. I reviewed the vital signs, available nursing notes, past medical history, past surgical history, family history and social history. Vital Signs-Reviewed the patient's vital signs. Patient Vitals for the past 12 hrs: 
 Temp Pulse Resp BP SpO2  
01/29/19 0019 98.3 °F (36.8 °C) 79 14 (!) 136/96 98 % Records Reviewed: Nursing Notes and Old Medical Records EKG interpretation: (Preliminary) 0024 Rhythm: normal sinus rhythm; and regular . Rate (approx.): 82; Axis: normal; VT interval: normal; QRS interval: normal ; QTc: normal; ST/T wave: nonspecific ST changes Written by Nancylee Spatz, ED scribe, as dictated by Selam Sigala MD 
 
Provider Notes (Medical Decision Making): DDx: bronchitis, PNA, viral syndrome, URI, ACS 
 
ED Course:  
Initial assessment performed. The patients presenting problems have been discussed, and they are in agreement with the care plan formulated and outlined with them. I have encouraged them to ask questions as they arise throughout their visit. Medications Given in the ED: 
Medications - No data to display Progress note: 
3:00 AM 
Pt noted to be feeling better, ready for discharge. Updated pt and/or family on all final lab and imaging findings. Will follow up as instructed. All questions have been answered, pt voiced understanding and agreement with plan. Narcotics were prescribed, pt was advised not to drive or operate heavy machinery. Abx were prescribed, pt advised that diarrhea and rash are possible side effects of the medications. Specific return precautions provided as well as instructions to return to the ED should sx worsen at any time. Vital signs stable for discharge. Critical Care Time:  
none Disposition: 
DISCHARGE NOTE: 
3:01 AM 
The patient is ready for discharge.  The patients signs, symptoms, diagnosis, and instructions for discharge have been discussed and the pt has conveyed their understanding. The patient is to follow up as recommended with PCP or return to the ER should their symptoms worsen. Plan has been discussed and patient has conveyed their agreement. PLAN: 
1. Discharge home Current Discharge Medication List  
  
START taking these medications Details  
guaiFENesin-codeine (ROBITUSSIN AC) 100-10 mg/5 mL solution Take 5 mL by mouth three (3) times daily as needed for Cough. Max Daily Amount: 15 mL. Qty: 120 mL, Refills: 0 Associated Diagnoses: Acute bronchitis, unspecified organism  
  
azithromycin (ZITHROMAX Z-JENISE) 250 mg tablet As directed Qty: 6 Tab, Refills: 0  
  
benzonatate (TESSALON PERLES) 100 mg capsule Take 1 Cap by mouth three (3) times daily as needed for Cough for up to 7 days. Qty: 30 Cap, Refills: 0  
  
albuterol (PROVENTIL VENTOLIN) 2.5 mg /3 mL (0.083 %) nebulizer solution 3 mL by Nebulization route every four (4) hours as needed for Wheezing. Qty: 24 Each, Refills: 0  
  
albuterol (PROVENTIL HFA, VENTOLIN HFA, PROAIR HFA) 90 mcg/actuation inhaler Take 2 Puffs by inhalation every four (4) hours as needed for Wheezing. Qty: 1 Inhaler, Refills: 0  
  
  
 
2. Follow-up Information Follow up With Specialties Details Why Contact Info Kain Klein MD Internal Medicine   [address] [place] [address] [address] 
[phone] [facility] EMERGENCY DEPT Emergency Medicine  If symptoms worsen [address] [facility] 
[phone] Return to ED if worse Diagnosis Clinical Impression: 1. Acute bronchitis, unspecified organism Attestations: This note is prepared by Sabine Dao, acting as Scribe for Zoey Rivas MD. Rhett Manriquez MD: The scribe's documentation has been prepared under my direction and personally reviewed by me in its entirety.  I confirm that the note above accurately reflects all work, treatment, procedures, and medical decision making performed by me. This note will not be viewable in [address] intact

## 2021-02-03 NOTE — ED ADULT NURSE NOTE - OBJECTIVE STATEMENT
pt c/o SOB, has hx of COPD, CHF. Pt admits to non-compliance with Lasix at home. Pt denies CP, fever. Pt c/o of bilateral leg pain, pt has +3 pitting edema in both lower ext, pt has hx of lymphoedema. Pt a&ox3 GCS 15

## 2021-02-03 NOTE — ED PROVIDER NOTE - CLINICAL SUMMARY MEDICAL DECISION MAKING FREE TEXT BOX
76 yo F presenting with SOB, leg pain, leg swelling. r/o COPD exacerbation vs CHF exacerbation vs ACS vs PE/DVT. No signs of fluid overload on exam. EKG without KITTY/STD. Pt tba as unable to ambulate at home, generalized weakness. CT head ordered as patient c/o hallucinations, otherwise A&Ox3.

## 2021-02-04 LAB
ANION GAP SERPL CALC-SCNC: 6 MMOL/L — SIGNIFICANT CHANGE UP (ref 5–17)
BUN SERPL-MCNC: 92 MG/DL — HIGH (ref 7–23)
CALCIUM SERPL-MCNC: 8.9 MG/DL — SIGNIFICANT CHANGE UP (ref 8.5–10.1)
CHLORIDE SERPL-SCNC: 116 MMOL/L — HIGH (ref 96–108)
CO2 SERPL-SCNC: 23 MMOL/L — SIGNIFICANT CHANGE UP (ref 22–31)
CREAT SERPL-MCNC: 1.76 MG/DL — HIGH (ref 0.5–1.3)
CULTURE RESULTS: NO GROWTH — SIGNIFICANT CHANGE UP
GLUCOSE SERPL-MCNC: 137 MG/DL — HIGH (ref 70–99)
HCT VFR BLD CALC: 33.3 % — LOW (ref 34.5–45)
HGB BLD-MCNC: 10.3 G/DL — LOW (ref 11.5–15.5)
MCHC RBC-ENTMCNC: 27.8 PG — SIGNIFICANT CHANGE UP (ref 27–34)
MCHC RBC-ENTMCNC: 30.9 GM/DL — LOW (ref 32–36)
MCV RBC AUTO: 89.8 FL — SIGNIFICANT CHANGE UP (ref 80–100)
NRBC # BLD: 0 /100 WBCS — SIGNIFICANT CHANGE UP (ref 0–0)
PLATELET # BLD AUTO: 148 K/UL — LOW (ref 150–400)
POTASSIUM SERPL-MCNC: 4.8 MMOL/L — SIGNIFICANT CHANGE UP (ref 3.5–5.3)
POTASSIUM SERPL-SCNC: 4.8 MMOL/L — SIGNIFICANT CHANGE UP (ref 3.5–5.3)
PROCALCITONIN SERPL-MCNC: 0.46 NG/ML — HIGH (ref 0.02–0.1)
RBC # BLD: 3.71 M/UL — LOW (ref 3.8–5.2)
RBC # FLD: 15.1 % — HIGH (ref 10.3–14.5)
SODIUM SERPL-SCNC: 145 MMOL/L — SIGNIFICANT CHANGE UP (ref 135–145)
SPECIMEN SOURCE: SIGNIFICANT CHANGE UP
WBC # BLD: 11.58 K/UL — HIGH (ref 3.8–10.5)
WBC # FLD AUTO: 11.58 K/UL — HIGH (ref 3.8–10.5)

## 2021-02-04 PROCEDURE — 99232 SBSQ HOSP IP/OBS MODERATE 35: CPT

## 2021-02-04 PROCEDURE — 99233 SBSQ HOSP IP/OBS HIGH 50: CPT

## 2021-02-04 PROCEDURE — 93306 TTE W/DOPPLER COMPLETE: CPT | Mod: 26

## 2021-02-04 PROCEDURE — 76775 US EXAM ABDO BACK WALL LIM: CPT | Mod: 26

## 2021-02-04 RX ORDER — NYSTATIN CREAM 100000 [USP'U]/G
1 CREAM TOPICAL
Refills: 0 | Status: DISCONTINUED | OUTPATIENT
Start: 2021-02-04 | End: 2021-02-09

## 2021-02-04 RX ORDER — MORPHINE SULFATE 50 MG/1
2 CAPSULE, EXTENDED RELEASE ORAL EVERY 6 HOURS
Refills: 0 | Status: DISCONTINUED | OUTPATIENT
Start: 2021-02-04 | End: 2021-02-09

## 2021-02-04 RX ORDER — SENNA PLUS 8.6 MG/1
2 TABLET ORAL
Refills: 0 | Status: DISCONTINUED | OUTPATIENT
Start: 2021-02-04 | End: 2021-02-09

## 2021-02-04 RX ORDER — OXYCODONE AND ACETAMINOPHEN 5; 325 MG/1; MG/1
1 TABLET ORAL EVERY 6 HOURS
Refills: 0 | Status: DISCONTINUED | OUTPATIENT
Start: 2021-02-04 | End: 2021-02-09

## 2021-02-04 RX ORDER — TRAMADOL HYDROCHLORIDE 50 MG/1
25 TABLET ORAL ONCE
Refills: 0 | Status: DISCONTINUED | OUTPATIENT
Start: 2021-02-04 | End: 2021-02-04

## 2021-02-04 RX ORDER — BUDESONIDE AND FORMOTEROL FUMARATE DIHYDRATE 160; 4.5 UG/1; UG/1
2 AEROSOL RESPIRATORY (INHALATION)
Refills: 0 | Status: DISCONTINUED | OUTPATIENT
Start: 2021-02-04 | End: 2021-02-09

## 2021-02-04 RX ORDER — AZITHROMYCIN 500 MG/1
TABLET, FILM COATED ORAL
Refills: 0 | Status: DISCONTINUED | OUTPATIENT
Start: 2021-02-04 | End: 2021-02-09

## 2021-02-04 RX ORDER — AZITHROMYCIN 500 MG/1
500 TABLET, FILM COATED ORAL ONCE
Refills: 0 | Status: COMPLETED | OUTPATIENT
Start: 2021-02-04 | End: 2021-02-04

## 2021-02-04 RX ORDER — AZITHROMYCIN 500 MG/1
500 TABLET, FILM COATED ORAL EVERY 24 HOURS
Refills: 0 | Status: DISCONTINUED | OUTPATIENT
Start: 2021-02-05 | End: 2021-02-09

## 2021-02-04 RX ADMIN — HEPARIN SODIUM 5000 UNIT(S): 5000 INJECTION INTRAVENOUS; SUBCUTANEOUS at 16:14

## 2021-02-04 RX ADMIN — NYSTATIN CREAM 1 APPLICATION(S): 100000 CREAM TOPICAL at 16:16

## 2021-02-04 RX ADMIN — Medication 40 MILLIGRAM(S): at 10:44

## 2021-02-04 RX ADMIN — Medication 40 MILLIGRAM(S): at 22:08

## 2021-02-04 RX ADMIN — Medication 40 MILLIGRAM(S): at 06:17

## 2021-02-04 RX ADMIN — BUDESONIDE AND FORMOTEROL FUMARATE DIHYDRATE 2 PUFF(S): 160; 4.5 AEROSOL RESPIRATORY (INHALATION) at 18:09

## 2021-02-04 RX ADMIN — SENNA PLUS 2 TABLET(S): 8.6 TABLET ORAL at 10:42

## 2021-02-04 RX ADMIN — Medication 1 TABLET(S): at 10:42

## 2021-02-04 RX ADMIN — AZITHROMYCIN 255 MILLIGRAM(S): 500 TABLET, FILM COATED ORAL at 15:18

## 2021-02-04 RX ADMIN — ALBUTEROL 1 PUFF(S): 90 AEROSOL, METERED ORAL at 06:17

## 2021-02-04 RX ADMIN — ALBUTEROL 1 PUFF(S): 90 AEROSOL, METERED ORAL at 01:18

## 2021-02-04 RX ADMIN — SENNA PLUS 2 TABLET(S): 8.6 TABLET ORAL at 16:14

## 2021-02-04 RX ADMIN — SODIUM CHLORIDE 80 MILLILITER(S): 9 INJECTION INTRAMUSCULAR; INTRAVENOUS; SUBCUTANEOUS at 22:08

## 2021-02-04 RX ADMIN — HEPARIN SODIUM 5000 UNIT(S): 5000 INJECTION INTRAVENOUS; SUBCUTANEOUS at 06:17

## 2021-02-04 RX ADMIN — Medication 1 TABLET(S): at 16:14

## 2021-02-04 RX ADMIN — Medication 40 MILLIGRAM(S): at 01:18

## 2021-02-04 RX ADMIN — OXYCODONE AND ACETAMINOPHEN 1 TABLET(S): 5; 325 TABLET ORAL at 21:53

## 2021-02-04 NOTE — PROGRESS NOTE ADULT - SUBJECTIVE AND OBJECTIVE BOX
Subjective: remains on droplet isolation. She tested NEG for COVID yest.       MEDICATIONS  (STANDING):  ALBUTerol    90 MICROgram(s) HFA Inhaler 1 Puff(s) Inhalation every 4 hours  heparin   Injectable 5000 Unit(s) SubCutaneous every 12 hours  methylPREDNISolone sodium succinate Injectable 40 milliGRAM(s) IV Push every 6 hours  sodium chloride 0.9%. 1000 milliLiter(s) (80 mL/Hr) IV Continuous <Continuous>  tiotropium 18 MICROgram(s) Capsule 1 Capsule(s) Inhalation daily    MEDICATIONS  (PRN):  acetaminophen 300 mG/codeine 30 mG 1 Tablet(s) Oral every 4 hours PRN Moderate Pain (4 - 6)          T(C): 36.5 (21 @ 05:51), Max: 36.9 (21 @ 17:06)  HR: 96 (21 @ 05:51) (95 - 120)  BP: 104/55 (21 @ 05:51) (80/44 - 114/51)  RR: 19 (21 @ 05:51) (17 - 19)  SpO2: 98% (21 @ 05:51) (95% - 109%)  Wt(kg): --    ABG - ( 2021 07:51 )  pH, Arterial: x     pH, Blood: 7.39  /  pCO2: 32    /  pO2: 77    / HCO3: 19    / Base Excess: -4.6  /  SaO2: 96                  I&O's Detail    2021 07:01  -  2021 07:00  --------------------------------------------------------  IN:  Total IN: 0 mL    OUT:    Voided (mL): 1000 mL  Total OUT: 1000 mL    Total NET: -1000 mL               PHYSICAL EXAM:    GENERAL: mild dyspnea at rest.   NECK: Supple, no inc in JVP  CHEST/LUNG: dec BS  HEART: S1S2  ABDOMEN: Soft, obese  EXTREMITIES:  pos edema  NEURO: no asterixis      LABS:  CBC Full  -  ( 2021 08:40 )  WBC Count : 11.58 K/uL  RBC Count : 3.71 M/uL  Hemoglobin : 10.3 g/dL  Hematocrit : 33.3 %  Platelet Count - Automated : 148 K/uL  Mean Cell Volume : 89.8 fl  Mean Cell Hemoglobin : 27.8 pg  Mean Cell Hemoglobin Concentration : 30.9 gm/dL  Auto Neutrophil # : x  Auto Lymphocyte # : x  Auto Monocyte # : x  Auto Eosinophil # : x  Auto Basophil # : x  Auto Neutrophil % : x  Auto Lymphocyte % : x  Auto Monocyte % : x  Auto Eosinophil % : x  Auto Basophil % : x        145  |  116<H>  |  92<H>  ----------------------------<  137<H>  4.8   |  23  |  1.76<H>    Ca    8.9      2021 08:40  Mg     2.7         TPro  7.1  /  Alb  3.0<L>  /  TBili  0.8  /  DBili  x   /  AST  28  /  ALT  42  /  AlkPhos  79  -    PT/INR - ( 2021 07:49 )   PT: 14.2 sec;   INR: 1.24 ratio         PTT - ( 2021 07:49 )  PTT:27.1 sec  Urinalysis Basic - ( 2021 08:43 )    Color: Basilia / Appearance: Clear / S.020 / pH: x  Gluc: x / Ketone: Trace  / Bili: Moderate / Urobili: 4 mg/dL   Blood: x / Protein: 15 mg/dL / Nitrite: Negative   Leuk Esterase: Trace / RBC: 0-2 /HPF / WBC 3-5   Sq Epi: x / Non Sq Epi: Few / Bacteria: Moderate      Culture Results:   No growth ( @ 11:21)        Assessment:  BARBARA. No hydro on US. Pre-renal azotemia.   No evidence of contrast ATN pot CT angio yest.       Recommendations:  Cont to hold diuretic  Taper IVFs over next 24h  BMP daily      Thank you!

## 2021-02-04 NOTE — PROGRESS NOTE ADULT - SUBJECTIVE AND OBJECTIVE BOX
Patient is a 75y old  Female who presents with a chief complaint of short of breath (2021 09:52)      OVERNIGHT EVENTS:      REVIEW OF SYSTEMS: denies chest pain/SOB, diaphoresis, no F/C, cough, dizziness, headache, blurry vision, nausea, vomiting, abdominal pain. Rest unremarkable     MEDICATIONS  (STANDING):  ALBUTerol    90 MICROgram(s) HFA Inhaler 1 Puff(s) Inhalation every 4 hours  heparin   Injectable 5000 Unit(s) SubCutaneous every 12 hours  methylPREDNISolone sodium succinate Injectable 40 milliGRAM(s) IV Push every 6 hours  nystatin Cream 1 Application(s) Topical two times a day  senna 2 Tablet(s) Oral two times a day  sodium chloride 0.9%. 1000 milliLiter(s) (80 mL/Hr) IV Continuous <Continuous>  tiotropium 18 MICROgram(s) Capsule 1 Capsule(s) Inhalation daily    MEDICATIONS  (PRN):  acetaminophen 300 mG/codeine 30 mG 1 Tablet(s) Oral every 4 hours PRN Moderate Pain (4 - 6)      Allergies    No Known Allergies    Intolerances        SUBJECTIVE: in bed in NAD, no acute events overnight     T(F): 97.9 (21 @ 11:58), Max: 98.4 (21 @ 17:06)  HR: 106 (21 @ 11:58) (95 - 120)  BP: 110/67 (21 @ 11:58) (95/53 - 114/51)  RR: 18 (21 @ 11:58) (17 - 19)  SpO2: 96% (21 @ 11:58) (95% - 100%)  Wt(kg): --    PHYSICAL EXAM:  GENERAL: NAD, well-groomed, well-developed  HEAD:  Atraumatic, Normocephalic  EYES: EOMI, PERRLA, conjunctiva and sclera clear  ENMT: No tonsillar erythema, exudates, or enlargement; Moist mucous membranes, Good dentition, No lesions  NECK: Supple, No JVD, Normal thyroid  CHEST/LUNG: Clear to  auscultation bilaterally; No rales, rhonchi, wheezing, or rubs  bilaterally  HEART: Regular rate and rhythm; No murmurs, rubs, or gallops  ABDOMEN: Soft, Nontender, Nondistended; Bowel sounds present  EXTREMITIES:  2+ Peripheral Pulses, No clubbing, cyanosis, or edema BL LE  LYMPH: No lymphadenopathy noted  SKIN: No rashes or lesions  NERVOUS SYSTEM:  Alert & Oriented X3, Good concentration; Motor Strength 5/5 B/L upper and lower extremities;   DTRs 2+ intact and symmetric, sensation intact BL    LABS:                        10.3   11.58 )-----------( 148      ( 2021 08:40 )             33.3     02-04    145  |  116<H>  |  92<H>  ----------------------------<  137<H>  4.8   |  23  |  1.76<H>    Ca    8.9      2021 08:40  Mg     2.7     02-03    TPro  7.1  /  Alb  3.0<L>  /  TBili  0.8  /  DBili  x   /  AST  28  /  ALT  42  /  AlkPhos  79  02-03    PT/INR - ( 2021 07:49 )   PT: 14.2 sec;   INR: 1.24 ratio         PTT - ( 2021 07:49 )  PTT:27.1 sec  Urinalysis Basic - ( 2021 08:43 )    Color: Basilia / Appearance: Clear / S.020 / pH: x  Gluc: x / Ketone: Trace  / Bili: Moderate / Urobili: 4 mg/dL   Blood: x / Protein: 15 mg/dL / Nitrite: Negative   Leuk Esterase: Trace / RBC: 0-2 /HPF / WBC 3-5   Sq Epi: x / Non Sq Epi: Few / Bacteria: Moderate      Cultures;   CAPILLARY BLOOD GLUCOSE        Lipid panel:     CARDIAC MARKERS ( 2021 07:49 )  <.015 ng/mL / x     / x     / x     / x            RADIOLOGY & ADDITIONAL TESTS:      Imaging Personally Reviewed:  [ x] YES      Consultant(s) Notes Reviewed:  [x ] YES     Care Discussed with [x ] Consultants [X ] Patient [x ] Family  [x ]    [x ]  Other; RN Patient is a 75y old  Female who presents with a chief complaint of short of breath (2021 09:52)      OVERNIGHT EVENTS:  none    MEDICATIONS  (STANDING):  ALBUTerol    90 MICROgram(s) HFA Inhaler 1 Puff(s) Inhalation every 4 hours  heparin   Injectable 5000 Unit(s) SubCutaneous every 12 hours  methylPREDNISolone sodium succinate Injectable 40 milliGRAM(s) IV Push every 6 hours  nystatin Cream 1 Application(s) Topical two times a day  senna 2 Tablet(s) Oral two times a day  sodium chloride 0.9%. 1000 milliLiter(s) (80 mL/Hr) IV Continuous <Continuous>  tiotropium 18 MICROgram(s) Capsule 1 Capsule(s) Inhalation daily    MEDICATIONS  (PRN):  acetaminophen 300 mG/codeine 30 mG 1 Tablet(s) Oral every 4 hours PRN Moderate Pain (4 - 6)      Allergies    No Known Allergies    Intolerances        SUBJECTIVE: in bed in NAD, no acute events overnight     T(F): 97.9 (21 @ 11:58), Max: 98.4 (21 @ 17:06)  HR: 106 (21 @ 11:58) (95 - 120)  BP: 110/67 (21 @ 11:58) (95/53 - 114/51)  RR: 18 (21 @ 11:58) (17 - 19)  SpO2: 96% (21 @ 11:58) (95% - 100%)  Wt(kg): --    PHYSICAL EXAM:  GENERAL: NAD, well-groomed, well-developed, morbidly obese  HEAD:  Atraumatic, Normocephalic  EYES: EOMI, PERRLA, conjunctiva and sclera clear  ENMT: No tonsillar erythema, exudates, or enlargement; Moist mucous membranes, Good dentition, No lesions  NECK: Supple, No JVD, Normal thyroid  CHEST/LUNG: Clear to  auscultation bilaterally; No rales, rhonchi, wheezing, or rubs  bilaterally  HEART: Regular rate and rhythm; No murmurs, rubs, or gallops  ABDOMEN: Soft, Nontender, Nondistended; Bowel sounds present  EXTREMITIES:  2+ Peripheral Pulses, No clubbing, cyanosis, or edema BL LE  SKIN: No rashes or lesions  NERVOUS SYSTEM:  Alert & Oriented X3, Good concentration; Motor Strength 5/5 B/L upper and 3-/5  lower extremities; lymphedema with weeping lesion on the sandra of legs   DTRs 2+ intact and symmetric, sensation intact BL    LABS:                        10.3   11.58 )-----------( 148      ( 2021 08:40 )             33.3     02-04    145  |  116<H>  |  92<H>  ----------------------------<  137<H>  4.8   |  23  |  1.76<H>    Ca    8.9      2021 08:40  Mg     2.7     02-03    TPro  7.1  /  Alb  3.0<L>  /  TBili  0.8  /  DBili  x   /  AST  28  /  ALT  42  /  AlkPhos  79  02-03    PT/INR - ( 2021 07:49 )   PT: 14.2 sec;   INR: 1.24 ratio         PTT - ( 2021 07:49 )  PTT:27.1 sec  Urinalysis Basic - ( 2021 08:43 )    Color: Basilia / Appearance: Clear / S.020 / pH: x  Gluc: x / Ketone: Trace  / Bili: Moderate / Urobili: 4 mg/dL   Blood: x / Protein: 15 mg/dL / Nitrite: Negative   Leuk Esterase: Trace / RBC: 0-2 /HPF / WBC 3-5   Sq Epi: x / Non Sq Epi: Few / Bacteria: Moderate      Cultures;   CAPILLARY BLOOD GLUCOSE        Lipid panel:     CARDIAC MARKERS ( 2021 07:49 )  <.015 ng/mL / x     / x     / x     / x            RADIOLOGY & ADDITIONAL TESTS:      Imaging Personally Reviewed:  [ x] YES      Consultant(s) Notes Reviewed:  [x ] YES     Care Discussed with [x ] Consultants [X ] Patient [x ] Family  [x ]    [x ]  Other; RN

## 2021-02-04 NOTE — PROGRESS NOTE ADULT - SUBJECTIVE AND OBJECTIVE BOX
JA[facility] [place]    [address]    Patient is a 75y old  Female who presents with a chief complaint of short of breath (2021 21:54)       Allergies    No Known Allergies    Intolerances        HPI:  74 yo F hx COPD on home O2, HTN, HLD, chronic lymphedema BIBA for complaints of generalized weakness and shortness of breath. Patient lives with sister who was on her way to work and did not feel comfortable leaving patient alone at home today. Patient reports increasing SOB and weakness over the last few weeks. Increased swelling and pain to lower extremities. Denies focal numbness/tingling. No chest pain. No cough.       (2021 10:38)      PAST MEDICAL & SURGICAL HISTORY:  COPD (chronic obstructive pulmonary disease)    HTN (hypertension)        FAMILY HISTORY:        MEDICATIONS   acetaminophen 300 mG/codeine 30 mG 1 Tablet(s) Oral every 4 hours PRN  ALBUTerol    90 MICROgram(s) HFA Inhaler 1 Puff(s) Inhalation every 4 hours  heparin   Injectable 5000 Unit(s) SubCutaneous every 12 hours  methylPREDNISolone sodium succinate Injectable 40 milliGRAM(s) IV Push every 6 hours  sodium chloride 0.9%. 1000 milliLiter(s) IV Continuous <Continuous>  tiotropium 18 MICROgram(s) Capsule 1 Capsule(s) Inhalation daily      Vital Signs Last 24 Hrs  T(C): 36.5 (2021 05:51), Max: 36.9 (2021 17:06)  T(F): 97.7 (2021 05:51), Max: 98.4 (2021 17:06)  HR: 96 (2021 05:51) (95 - 120)  BP: 104/55 (2021 05:51) (80/44 - 114/51)  BP(mean): 67 (2021 20:34) (67 - 73)  RR: 19 (2021 05:51) (17 - 19)  SpO2: 98% (2021 05:51) (95% - 109%)      21 @ 07:01  -  21 @ 07:00  --------------------------------------------------------  IN: 0 mL / OUT: 1000 mL / NET: -1000 mL            LABS:                        10.3   11.58 )-----------( 148      ( 2021 08:40 )             33.3     02    145  |  116<H>  |  92<H>  ----------------------------<  137<H>  4.8   |  23  |  1.76<H>    Ca    8.9      2021 08:40  Mg     2.7     -    TPro  7.1  /  Alb  3.0<L>  /  TBili  0.8  /  DBili  x   /  AST  28  /  ALT  42  /  AlkPhos  79      PT/INR - ( 2021 07:49 )   PT: 14.2 sec;   INR: 1.24 ratio         PTT - ( 2021 07:49 )  PTT:27.1 sec  Urinalysis Basic - ( 2021 08:43 )    Color: Basilia / Appearance: Clear / S.020 / pH: x  Gluc: x / Ketone: Trace  / Bili: Moderate / Urobili: 4 mg/dL   Blood: x / Protein: 15 mg/dL / Nitrite: Negative   Leuk Esterase: Trace / RBC: 0-2 /HPF / WBC 3-5   Sq Epi: x / Non Sq Epi: Few / Bacteria: Moderate        ABG - ( 2021 07:51 )  pH, Arterial: x     pH, Blood: 7.39  /  pCO2: 32    /  pO2: 77    / HCO3: 19    / Base Excess: -4.6  /  SaO2: 96                  WBC:  WBC Count: 11.58 K/uL ( @ 08:40)  WBC Count: 10.50 K/uL ( @ 07:49)      MICROBIOLOGY:  RECENT CULTURES:   .Urine Catheterized XXXX XXXX   No growth          CARDIAC MARKERS ( 2021 07:49 )  <.015 ng/mL / x     / x     / x     / x            PT/INR - ( 2021 07:49 )   PT: 14.2 sec;   INR: 1.24 ratio         PTT - ( 2021 07:49 )  PTT:27.1 sec    Sodium:  Sodium, Serum: 145 mmol/L ( @ 08:40)  Sodium, Serum: 142 mmol/L ( @ 07:49)      1.76 mg/dL  @ 08:40  2.29 mg/dL  @ 07:49      Hemoglobin:  Hemoglobin: 10.3 g/dL ( @ 08:40)  Hemoglobin: 12.8 g/dL ( @ 07:49)      Platelets: Platelet Count - Automated: 148 K/uL ( @ 08:40)  Platelet Count - Automated: 191 K/uL ( @ 07:49)      LIVER FUNCTIONS - ( 2021 07:49 )  Alb: 3.0 g/dL / Pro: 7.1 gm/dL / ALK PHOS: 79 U/L / ALT: 42 U/L / AST: 28 U/L / GGT: x             Urinalysis Basic - ( 2021 08:43 )    Color: Basilia / Appearance: Clear / S.020 / pH: x  Gluc: x / Ketone: Trace  / Bili: Moderate / Urobili: 4 mg/dL   Blood: x / Protein: 15 mg/dL / Nitrite: Negative   Leuk Esterase: Trace / RBC: 0-2 /HPF / WBC 3-5   Sq Epi: x / Non Sq Epi: Few / Bacteria: Moderate        RADIOLOGY & ADDITIONAL STUDIES:

## 2021-02-05 LAB
ANION GAP SERPL CALC-SCNC: 6 MMOL/L — SIGNIFICANT CHANGE UP (ref 5–17)
BUN SERPL-MCNC: 71 MG/DL — HIGH (ref 7–23)
CALCIUM SERPL-MCNC: 8.8 MG/DL — SIGNIFICANT CHANGE UP (ref 8.5–10.1)
CHLORIDE SERPL-SCNC: 113 MMOL/L — HIGH (ref 96–108)
CO2 SERPL-SCNC: 24 MMOL/L — SIGNIFICANT CHANGE UP (ref 22–31)
CREAT SERPL-MCNC: 1.45 MG/DL — HIGH (ref 0.5–1.3)
GLUCOSE SERPL-MCNC: 148 MG/DL — HIGH (ref 70–99)
HCT VFR BLD CALC: 34 % — LOW (ref 34.5–45)
HGB BLD-MCNC: 10.6 G/DL — LOW (ref 11.5–15.5)
MAGNESIUM SERPL-MCNC: 2.3 MG/DL — SIGNIFICANT CHANGE UP (ref 1.6–2.6)
MCHC RBC-ENTMCNC: 28.1 PG — SIGNIFICANT CHANGE UP (ref 27–34)
MCHC RBC-ENTMCNC: 31.2 GM/DL — LOW (ref 32–36)
MCV RBC AUTO: 90.2 FL — SIGNIFICANT CHANGE UP (ref 80–100)
NRBC # BLD: 0 /100 WBCS — SIGNIFICANT CHANGE UP (ref 0–0)
PHOSPHATE SERPL-MCNC: 2.8 MG/DL — SIGNIFICANT CHANGE UP (ref 2.5–4.5)
PLATELET # BLD AUTO: 151 K/UL — SIGNIFICANT CHANGE UP (ref 150–400)
POTASSIUM SERPL-MCNC: 4.8 MMOL/L — SIGNIFICANT CHANGE UP (ref 3.5–5.3)
POTASSIUM SERPL-SCNC: 4.8 MMOL/L — SIGNIFICANT CHANGE UP (ref 3.5–5.3)
RBC # BLD: 3.77 M/UL — LOW (ref 3.8–5.2)
RBC # FLD: 15.1 % — HIGH (ref 10.3–14.5)
SODIUM SERPL-SCNC: 143 MMOL/L — SIGNIFICANT CHANGE UP (ref 135–145)
WBC # BLD: 14.94 K/UL — HIGH (ref 3.8–10.5)
WBC # FLD AUTO: 14.94 K/UL — HIGH (ref 3.8–10.5)

## 2021-02-05 PROCEDURE — 99232 SBSQ HOSP IP/OBS MODERATE 35: CPT

## 2021-02-05 PROCEDURE — 99233 SBSQ HOSP IP/OBS HIGH 50: CPT

## 2021-02-05 RX ORDER — SODIUM CHLORIDE 9 MG/ML
1000 INJECTION, SOLUTION INTRAVENOUS
Refills: 0 | Status: DISCONTINUED | OUTPATIENT
Start: 2021-02-05 | End: 2021-02-06

## 2021-02-05 RX ORDER — IPRATROPIUM/ALBUTEROL SULFATE 18-103MCG
3 AEROSOL WITH ADAPTER (GRAM) INHALATION EVERY 6 HOURS
Refills: 0 | Status: DISCONTINUED | OUTPATIENT
Start: 2021-02-05 | End: 2021-02-09

## 2021-02-05 RX ADMIN — HEPARIN SODIUM 5000 UNIT(S): 5000 INJECTION INTRAVENOUS; SUBCUTANEOUS at 17:11

## 2021-02-05 RX ADMIN — ALBUTEROL 1 PUFF(S): 90 AEROSOL, METERED ORAL at 13:02

## 2021-02-05 RX ADMIN — MORPHINE SULFATE 2 MILLIGRAM(S): 50 CAPSULE, EXTENDED RELEASE ORAL at 21:13

## 2021-02-05 RX ADMIN — NYSTATIN CREAM 1 APPLICATION(S): 100000 CREAM TOPICAL at 17:28

## 2021-02-05 RX ADMIN — SODIUM CHLORIDE 50 MILLILITER(S): 9 INJECTION, SOLUTION INTRAVENOUS at 19:47

## 2021-02-05 RX ADMIN — OXYCODONE AND ACETAMINOPHEN 1 TABLET(S): 5; 325 TABLET ORAL at 05:30

## 2021-02-05 RX ADMIN — HEPARIN SODIUM 5000 UNIT(S): 5000 INJECTION INTRAVENOUS; SUBCUTANEOUS at 05:34

## 2021-02-05 RX ADMIN — Medication 40 MILLIGRAM(S): at 21:13

## 2021-02-05 RX ADMIN — ALBUTEROL 1 PUFF(S): 90 AEROSOL, METERED ORAL at 17:14

## 2021-02-05 RX ADMIN — ALBUTEROL 1 PUFF(S): 90 AEROSOL, METERED ORAL at 05:33

## 2021-02-05 RX ADMIN — NYSTATIN CREAM 1 APPLICATION(S): 100000 CREAM TOPICAL at 05:35

## 2021-02-05 RX ADMIN — SENNA PLUS 2 TABLET(S): 8.6 TABLET ORAL at 05:29

## 2021-02-05 RX ADMIN — AZITHROMYCIN 255 MILLIGRAM(S): 500 TABLET, FILM COATED ORAL at 13:00

## 2021-02-05 RX ADMIN — BUDESONIDE AND FORMOTEROL FUMARATE DIHYDRATE 2 PUFF(S): 160; 4.5 AEROSOL RESPIRATORY (INHALATION) at 17:12

## 2021-02-05 RX ADMIN — ALBUTEROL 1 PUFF(S): 90 AEROSOL, METERED ORAL at 21:17

## 2021-02-05 RX ADMIN — SENNA PLUS 2 TABLET(S): 8.6 TABLET ORAL at 17:11

## 2021-02-05 RX ADMIN — Medication 3 MILLILITER(S): at 17:13

## 2021-02-05 RX ADMIN — BUDESONIDE AND FORMOTEROL FUMARATE DIHYDRATE 2 PUFF(S): 160; 4.5 AEROSOL RESPIRATORY (INHALATION) at 05:33

## 2021-02-05 RX ADMIN — ALBUTEROL 1 PUFF(S): 90 AEROSOL, METERED ORAL at 11:39

## 2021-02-05 RX ADMIN — OXYCODONE AND ACETAMINOPHEN 1 TABLET(S): 5; 325 TABLET ORAL at 15:02

## 2021-02-05 NOTE — DIETITIAN INITIAL EVALUATION ADULT. - PERTINENT MEDS FT
MEDICATIONS  (STANDING):  ALBUTerol    90 MICROgram(s) HFA Inhaler 1 Puff(s) Inhalation every 4 hours  azithromycin  IVPB      azithromycin  IVPB 500 milliGRAM(s) IV Intermittent every 24 hours  budesonide 160 MICROgram(s)/formoterol 4.5 MICROgram(s) Inhaler 2 Puff(s) Inhalation two times a day  heparin   Injectable 5000 Unit(s) SubCutaneous every 12 hours  methylPREDNISolone sodium succinate Injectable 40 milliGRAM(s) IV Push daily  nystatin Cream 1 Application(s) Topical two times a day  senna 2 Tablet(s) Oral two times a day  sodium chloride 0.9%. 1000 milliLiter(s) (80 mL/Hr) IV Continuous <Continuous>  tiotropium 18 MICROgram(s) Capsule 1 Capsule(s) Inhalation daily    MEDICATIONS  (PRN):  morphine  - Injectable 2 milliGRAM(s) IV Push every 6 hours PRN Severe Pain (7 - 10)  oxycodone    5 mG/acetaminophen 325 mG 1 Tablet(s) Oral every 6 hours PRN Moderate Pain (4 - 6)

## 2021-02-05 NOTE — PROGRESS NOTE ADULT - SUBJECTIVE AND OBJECTIVE BOX
Patient is a 75y old  Female who presents with a chief complaint of short of breath (2021 09:52)      OVERNIGHT EVENTS:  none    MEDICATIONS  (STANDING):  ALBUTerol    90 MICROgram(s) HFA Inhaler 1 Puff(s) Inhalation every 4 hours  azithromycin  IVPB      azithromycin  IVPB 500 milliGRAM(s) IV Intermittent every 24 hours  budesonide 160 MICROgram(s)/formoterol 4.5 MICROgram(s) Inhaler 2 Puff(s) Inhalation two times a day  heparin   Injectable 5000 Unit(s) SubCutaneous every 12 hours  methylPREDNISolone sodium succinate Injectable 40 milliGRAM(s) IV Push daily  nystatin Cream 1 Application(s) Topical two times a day  senna 2 Tablet(s) Oral two times a day  sodium chloride 0.9%. 1000 milliLiter(s) (80 mL/Hr) IV Continuous <Continuous>  tiotropium 18 MICROgram(s) Capsule 1 Capsule(s) Inhalation daily    MEDICATIONS  (PRN):  morphine  - Injectable 2 milliGRAM(s) IV Push every 6 hours PRN Severe Pain (7 - 10)  oxycodone    5 mG/acetaminophen 325 mG 1 Tablet(s) Oral every 6 hours PRN Moderate Pain (4 - 6)      Allergies    No Known Allergies    Intolerances        SUBJECTIVE: in bed in NAD, no acute events overnight     Vital Signs Last 24 Hrs  T(C): 36.6 (2021 05:27), Max: 37 (2021 18:56)  T(F): 97.9 (2021 05:27), Max: 98.6 (2021 18:56)  HR: 108 (2021 10:50) (71 - 108)  BP: 118/65 (2021 10:50) (92/52 - 127/68)  BP(mean): --  RR: 18 (2021 10:50) (18 - 20)  SpO2: 95% (2021 10:50) (95% - 99%)    PHYSICAL EXAM:  GENERAL: NAD, well-groomed, well-developed, morbidly obese  HEAD:  Atraumatic, Normocephalic  EYES: EOMI, PERRLA, conjunctiva and sclera clear  ENMT: No tonsillar erythema, exudates, or enlargement; Moist mucous membranes, Good dentition, No lesions  NECK: Supple, No JVD, Normal thyroid  CHEST/LUNG: Clear to  auscultation bilaterally; No rales, rhonchi, wheezing, or rubs  bilaterally  HEART: Regular rate and rhythm; No murmurs, rubs, or gallops  ABDOMEN: Soft, Nontender, Nondistended; Bowel sounds present  EXTREMITIES:  2+ Peripheral Pulses, No clubbing, cyanosis, or edema BL LE  SKIN: No rashes or lesions  NERVOUS SYSTEM:  Alert & Oriented X3, Good concentration; Motor Strength 5/5 B/L upper and 3-/5  lower extremities; lymphedema with weeping lesion on the sandra of legs   DTRs 2+ intact and symmetric, sensation intact BL    LABS:                                     10.6   14.94 )-----------( 151      ( 2021 06:29 )             34.0   02-05    143  |  113<H>  |  71<H>  ----------------------------<  148<H>  4.8   |  24  |  1.45<H>    Ca    8.8      2021 06:29  Phos  2.8     02-05  Mg     2.3     02-05      Urinalysis Basic - ( 2021 08:43 )    Color: Basilia / Appearance: Clear / S.020 / pH: x  Gluc: x / Ketone: Trace  / Bili: Moderate / Urobili: 4 mg/dL   Blood: x / Protein: 15 mg/dL / Nitrite: Negative   Leuk Esterase: Trace / RBC: 0-2 /HPF / WBC 3-5   Sq Epi: x / Non Sq Epi: Few / Bacteria: Moderate      Cultures;   CAPILLARY BLOOD GLUCOSE        Lipid panel:     CARDIAC MARKERS ( 2021 07:49 )  <.015 ng/mL / x     / x     / x     / x            RADIOLOGY & ADDITIONAL TESTS:      Imaging Personally Reviewed:  [ x] YES      Consultant(s) Notes Reviewed:  [x ] YES     Care Discussed with [x ] Consultants [X ] Patient [x ] Family  [x ]    [x ]  Other; RN Patient is a 75y old  Female who presents with a chief complaint of short of breath (2021 09:52)      OVERNIGHT EVENTS:  none    MEDICATIONS  (STANDING):  ALBUTerol    90 MICROgram(s) HFA Inhaler 1 Puff(s) Inhalation every 4 hours  azithromycin  IVPB      azithromycin  IVPB 500 milliGRAM(s) IV Intermittent every 24 hours  budesonide 160 MICROgram(s)/formoterol 4.5 MICROgram(s) Inhaler 2 Puff(s) Inhalation two times a day  heparin   Injectable 5000 Unit(s) SubCutaneous every 12 hours  methylPREDNISolone sodium succinate Injectable 40 milliGRAM(s) IV Push daily  nystatin Cream 1 Application(s) Topical two times a day  senna 2 Tablet(s) Oral two times a day  sodium chloride 0.9%. 1000 milliLiter(s) (80 mL/Hr) IV Continuous <Continuous>  tiotropium 18 MICROgram(s) Capsule 1 Capsule(s) Inhalation daily    MEDICATIONS  (PRN):  morphine  - Injectable 2 milliGRAM(s) IV Push every 6 hours PRN Severe Pain (7 - 10)  oxycodone    5 mG/acetaminophen 325 mG 1 Tablet(s) Oral every 6 hours PRN Moderate Pain (4 - 6)      Allergies    No Known Allergies    Intolerances        SUBJECTIVE: in bed in NAD, no acute events overnight     Vital Signs Last 24 Hrs  T(C): 36.6 (2021 05:27), Max: 37 (2021 18:56)  T(F): 97.9 (2021 05:27), Max: 98.6 (2021 18:56)  HR: 108 (2021 10:50) (71 - 108)  BP: 118/65 (2021 10:50) (92/52 - 127/68)  BP(mean): --  RR: 18 (2021 10:50) (18 - 20)  SpO2: 95% (2021 10:50) (95% - 99%)    PHYSICAL EXAM:  GENERAL: NAD, well-groomed, well-developed, morbidly obese  HEAD:  Atraumatic, Normocephalic  EYES: EOMI, PERRLA, conjunctiva and sclera clear  ENMT: No tonsillar erythema, exudates, or enlargement; Moist mucous membranes, Good dentition, No lesions  NECK: Supple, No JVD, Normal thyroid  CHEST/LUNG: mild scattered wheezing   HEART: Regular rate and rhythm; No murmurs, rubs, or gallops  ABDOMEN: Soft, Nontender, Nondistended; Bowel sounds present  EXTREMITIES:  2+ Peripheral Pulses, No clubbing, cyanosis, or edema BL LE  SKIN: No rashes or lesions  NERVOUS SYSTEM:  Alert & Oriented X3, Good concentration; Motor Strength 5/5 B/L upper and 3-/5  lower extremities; lymphedema with weeping lesion on the sandra of legs   DTRs 2+ intact and symmetric, sensation intact BL    LABS:                                     10.6   14.94 )-----------( 151      ( 2021 06:29 )             34.0   02-05    143  |  113<H>  |  71<H>  ----------------------------<  148<H>  4.8   |  24  |  1.45<H>    Ca    8.8      2021 06:29  Phos  2.8     02-05  Mg     2.3     02-05      Urinalysis Basic - ( 2021 08:43 )    Color: Basilia / Appearance: Clear / S.020 / pH: x  Gluc: x / Ketone: Trace  / Bili: Moderate / Urobili: 4 mg/dL   Blood: x / Protein: 15 mg/dL / Nitrite: Negative   Leuk Esterase: Trace / RBC: 0-2 /HPF / WBC 3-5   Sq Epi: x / Non Sq Epi: Few / Bacteria: Moderate      Cultures;   CAPILLARY BLOOD GLUCOSE        Lipid panel:     CARDIAC MARKERS ( 2021 07:49 )  <.015 ng/mL / x     / x     / x     / x            RADIOLOGY & ADDITIONAL TESTS:      Imaging Personally Reviewed:  [ x] YES      Consultant(s) Notes Reviewed:  [x ] YES     Care Discussed with [x ] Consultants [X ] Patient [x ] Family  [x ]    [x ]  Other; RN

## 2021-02-05 NOTE — CONSULT NOTE ADULT - ASSESSMENT
[facility] [phone] [id number] 2/3/2021 DR MEGAN DURAN      Initial evaluation/Pulmonary Critical Care consultation requested on 2/3/2021   by Dr Duran   from Dr Foster   Patient examined chart reviewed    HOSPITAL ADMISSION   PATIENT CAME  FROM (if information available)      [facility]  [phone] 2/3/2021 DR MEGAN DURAN      REVIEW OF SYMPTOMS      Able to give ROS  Yes     RELIABLE No   CONSTITUTIONAL Weakness Yes  Chills No Vision changes No  ENDOCRINE No unexplained hair loss No heat or cold intolerance    ALLERGY No hives  Sore throat No   RESP Coughing blood no  Shortness of breath YES   NEURO No Headache  Confusion Pain neck No   CARDIAC No Chest pain No Palpitations   GI No Pain abdomen NO   Vomiting NO     PHYSICAL EXAM    HEENT Unremarkable PERRLA atraumatic   RESP Fair air entry EXP prolonged    Harsh breath sound Resp distres mild   CARDIAC S1 S2 No S3     NO JVD    ABDOMEN SOFT BS PRESENT NOT DISTENDED No hepatosplenomegaly PEDAL EDEMA present No calf tenderness  NO rash     PATIENT SUMMARY  74 yo F hx COPD on home O2, HTN, HLD, chronic lymphedema BIBA for complaints of generalized weakness and shortness of breath. Patient lives with sister who was on her way to work and did not feel comfortable leaving patient alone at home today. Patient reports increasing SOB and weakness over the last few weeks. Increased swelling and pain to lower extremities. Denies focal numbness/tingling. No chest pain. No cough  Pulm consulted 2/3/2021    PATIENT DATA/BEST PRACTICE ISSUES   ALLERGY. nka  WT.              2/3/2021 140  BMI.              2/3/2021 56  ADVANCED DIRECTIVE.     HEAD OF BED ELEVATION. Yes      DVT PROPHYLAXIS.          hpsc (2/3/2021)                                                                PRYOR PROPHYLAXIS.        DIET.        Dash (2/3/2021)                                                                   IV.    ns 80 (2/3/2021)   DYSPHAGIA EVAL.      PATIENT DATA  OXYGENATION. 4l 100%  HEMODYNAMICS.    VITALS/IO/VENT/DRIPS.  2/3/2021 afeb 106 100/50     ASSESSMENT PLAN   DYSPNEA  multifactorial sec obesity copd   RESP GAS EXCHANGE  ABG 2/3/2021 2l 739/32/77   Gas exchange ok   Target po 90-95%  COPD   albuterol.6 (2/3/78411   solumed 40.4 (2/3/2021)   spiriva (2/3/2021)   Taper steroids in am   RO VTE  d-dimr 2/3/2021 d-dimr 1020   ve duplx 2/3/2021 v duplx negv   cta ch 2/3/2021 cta ch negv pe   hpsc (2/3/2021)   vte ruled out  RO MI   tr 2/3/2021 Tr negv   check echo   INFECTION  w 2/3/2021 w 10.5  ua 2/3/2021 ua w 3-5 bact mod l estr tr  cxr 2/3/2021 cxr cm clear lungs    doubt infection   defer abio   check rvp   OBESITY   inc martha (2/3/2021)   HEMODYNAMICS   target map 65 (+)   BARBARA  cr 2/3/2021 cr 2.2   iv fluids  monitor   AMS   ct h 2/3/2021 ct h negv     TIME SPENT   Over 55 minutes aggregate care time spent on encounter; activities included   direct patient care, counseling and/or coordinating care reviewing notes, lab data/ imaging , discussion with multidisciplinary team/ patient  /family and explaining in detail risks, benefits, alternatives  of the recommendations     [facility] [phone] [id number] 2/3/2021 DR MEGAN DURAN     
2-5-21 The right and left leg ulcers are evaluated and agree with above , pt with lymphedema and venous stasis ulcers, no sig cellulitis and no infn, chronic non healing venous stasis ulcers and there may be issue with pressure as weel as ulcers are on post calf and the pt is bed bound with large heavy edematous legs.  plan As above , and off loading,if possible

## 2021-02-05 NOTE — PROGRESS NOTE ADULT - SUBJECTIVE AND OBJECTIVE BOX
JAZMIN IRELAND    LVS 1B [address]    Patient is a 75y old  Female who presents with a chief complaint of short of breath (2021 14:00)       Allergies    No Known Allergies    Intolerances        HPI:  74 yo F hx COPD on home O2, HTN, HLD, chronic lymphedema BIBA for complaints of generalized weakness and shortness of breath. Patient lives with sister who was on her way to work and did not feel comfortable leaving patient alone at home today. Patient reports increasing SOB and weakness over the last few weeks. Increased swelling and pain to lower extremities. Denies focal numbness/tingling. No chest pain. No cough.       (2021 10:38)      PAST MEDICAL & SURGICAL HISTORY:  COPD (chronic obstructive pulmonary disease)    HTN (hypertension)        FAMILY HISTORY:        MEDICATIONS   ALBUTerol    90 MICROgram(s) HFA Inhaler 1 Puff(s) Inhalation every 4 hours  azithromycin  IVPB      azithromycin  IVPB 500 milliGRAM(s) IV Intermittent every 24 hours  budesonide 160 MICROgram(s)/formoterol 4.5 MICROgram(s) Inhaler 2 Puff(s) Inhalation two times a day  heparin   Injectable 5000 Unit(s) SubCutaneous every 12 hours  methylPREDNISolone sodium succinate Injectable 40 milliGRAM(s) IV Push daily  morphine  - Injectable 2 milliGRAM(s) IV Push every 6 hours PRN  nystatin Cream 1 Application(s) Topical two times a day  oxycodone    5 mG/acetaminophen 325 mG 1 Tablet(s) Oral every 6 hours PRN  senna 2 Tablet(s) Oral two times a day  sodium chloride 0.9%. 1000 milliLiter(s) IV Continuous <Continuous>  tiotropium 18 MICROgram(s) Capsule 1 Capsule(s) Inhalation daily      Vital Signs Last 24 Hrs  T(C): 36.6 (2021 05:27), Max: 37 (2021 18:56)  T(F): 97.9 (2021 05:27), Max: 98.6 (2021 18:56)  HR: 96 (2021 05:27) (71 - 106)  BP: 127/68 (2021 05:27) (92/52 - 127/68)  BP(mean): --  RR: 18 (2021 05:27) (18 - 20)  SpO2: 96% (2021 05:27) (96% - 99%)      21 @ 07:01  -  21 @ 07:00  --------------------------------------------------------  IN: 960 mL / OUT: 600 mL / NET: 360 mL            LABS:                        10.6   14.94 )-----------( 151      ( 2021 06:29 )             34.0         143  |  113<H>  |  71<H>  ----------------------------<  148<H>  4.8   |  24  |  1.45<H>    Ca    8.8      2021 06:29  Phos  2.8       Mg     2.3             Urinalysis Basic - ( 2021 08:43 )    Color: Basilia / Appearance: Clear / S.020 / pH: x  Gluc: x / Ketone: Trace  / Bili: Moderate / Urobili: 4 mg/dL   Blood: x / Protein: 15 mg/dL / Nitrite: Negative   Leuk Esterase: Trace / RBC: 0-2 /HPF / WBC 3-5   Sq Epi: x / Non Sq Epi: Few / Bacteria: Moderate            WBC:  WBC Count: 14.94 K/uL ( @ 06:29)  WBC Count: 11.58 K/uL ( @ 08:40)  WBC Count: 10.50 K/uL ( @ 07:49)      MICROBIOLOGY:  RECENT CULTURES:   .Urine Catheterized XXXX XXXX   No growth                    Sodium:  Sodium, Serum: 143 mmol/L ( @ 06:29)  Sodium, Serum: 145 mmol/L ( @ 08:40)  Sodium, Serum: 142 mmol/L ( 07:49)      1.45 mg/dL  @ :29  1.76 mg/dL  08:40  2.29 mg/dL  @ 07:49      Hemoglobin:  Hemoglobin: 10.6 g/dL ( @ 06:29)  Hemoglobin: 10.3 g/dL ( @ 08:40)  Hemoglobin: 12.8 g/dL ( @ 07:49)      Platelets: Platelet Count - Automated: 151 K/uL ( @ 06:29)  Platelet Count - Automated: 148 K/uL ( @ 08:40)  Platelet Count - Automated: 191 K/uL ( @ 07:49)          Urinalysis Basic - ( 2021 08:43 )    Color: Basilia / Appearance: Clear / S.020 / pH: x  Gluc: x / Ketone: Trace  / Bili: Moderate / Urobili: 4 mg/dL   Blood: x / Protein: 15 mg/dL / Nitrite: Negative   Leuk Esterase: Trace / RBC: 0-2 /HPF / WBC 3-5   Sq Epi: x / Non Sq Epi: Few / Bacteria: Moderate        RADIOLOGY & ADDITIONAL STUDIES:

## 2021-02-05 NOTE — DIETITIAN INITIAL EVALUATION ADULT. - OTHER INFO
Pt reports her appetite is improving since admission (02/03). States she consumed ~50% breakfast this morning (02/05). Pt amenable to receiving Ensure Enlive x 1/day (provides 350 kcal, 20 g protein)- states she also wants to trial Vital Cuisine x 1/day (provides 520 kcal, 22 g protein)- notified diet office to send trial today (02/05). Denies difficulty chewing/swallowing; states she has her dentures with her- observed them on bedside table. Pt denies nausea, vomiting, diarrhea, or constipation. Reports she is unsure of recent weight changes as she has chronic lymphedema. States her UBW as of 6 months ago was 185 pounds. ?accuracy of stated UBW as previous RD note from 04/10/2019 has pt's weight at 339.5 pounds [facility]. Pt currently with edema as noted below.  Discussed with pt how to optimize PO intake and importance of protein intake. Weight loss education inappropriate at this time as pt still with poor PO intake. Education on follow-up as appropriate. Preferences taken and relayed to diet office. Pt made aware RD remains available.

## 2021-02-05 NOTE — DIETITIAN INITIAL EVALUATION ADULT. - PERTINENT LABORATORY DATA
02-05 Na143 mmol/L Glu 148 mg/dL<H> K+ 4.8 mmol/L Cr  1.45 mg/dL<H> BUN 71 mg/dL<H> 02-05 Phos 2.8 mg/dL 02-03 Alb 3.0 g/dL<L>

## 2021-02-05 NOTE — PROGRESS NOTE ADULT - SUBJECTIVE AND OBJECTIVE BOX
[facility] NEPHROLOGY SERVICES, [facility]  NEPHROLOGY AND HYPERTENSION  [address]  [address]  [place] [address]  [phone]    MD ROME KOROMA, MD VERONICA LE MD YELENA ROSENBERG, MD DECLAN PALOMINO, MD CHAYITO BENJAMIN MD          Patient events noted; no distress    MEDICATIONS  (STANDING):  ALBUTerol    90 MICROgram(s) HFA Inhaler 1 Puff(s) Inhalation every 4 hours  albuterol/ipratropium for Nebulization 3 milliLiter(s) Nebulizer every 6 hours  azithromycin  IVPB      azithromycin  IVPB 500 milliGRAM(s) IV Intermittent every 24 hours  budesonide 160 MICROgram(s)/formoterol 4.5 MICROgram(s) Inhaler 2 Puff(s) Inhalation two times a day  dextrose 5%. 1000 milliLiter(s) (50 mL/Hr) IV Continuous <Continuous>  heparin   Injectable 5000 Unit(s) SubCutaneous every 12 hours  methylPREDNISolone sodium succinate Injectable 40 milliGRAM(s) IV Push daily  nystatin Cream 1 Application(s) Topical two times a day  senna 2 Tablet(s) Oral two times a day  tiotropium 18 MICROgram(s) Capsule 1 Capsule(s) Inhalation daily    MEDICATIONS  (PRN):  morphine  - Injectable 2 milliGRAM(s) IV Push every 6 hours PRN Severe Pain (7 - 10)  oxycodone    5 mG/acetaminophen 325 mG 1 Tablet(s) Oral every 6 hours PRN Moderate Pain (4 - 6)      02-04-21 @ 07:01  -  02-05-21 @ 07:00  --------------------------------------------------------  IN: 960 mL / OUT: 600 mL / NET: 360 mL    02-05-21 @ 07:01  -  02-05-21 @ 21:01  --------------------------------------------------------  IN: 0 mL / OUT: 800 mL / NET: -800 mL      PHYSICAL EXAM:      T(C): 36.8 (02-05-21 @ 13:43), Max: 36.8 (02-05-21 @ 13:43)  HR: 88 (02-05-21 @ 13:43) (87 - 108)  BP: 125/77 (02-05-21 @ 13:43) (93/58 - 127/68)  RR: 18 (02-05-21 @ 13:43) (18 - 20)  SpO2: 94% (02-05-21 @ 13:43) (94% - 96%)  Wt(kg): --  Lungs clear  Heart S1S2  Abd soft NT ND  Extremities:   lymphedema                                    10.6   14.94 )-----------( 151      ( 05 Feb 2021 06:29 )             34.0     02-05    143  |  113<H>  |  71<H>  ----------------------------<  148<H>  4.8   |  24  |  1.45<H>    Ca    8.8      05 Feb 2021 06:29  Phos  2.8     02-05  Mg     2.3     02-05          Creatinine Trend: 1.45<--, 1.76<--, 2.29<--      Assessment:  BARBARA. No hydro on US. Pre-renal azotemia.   Improving       Recommendations:  Cont to hold diuretic  DC IVF  BMP daily        Marc Alcantar MD

## 2021-02-05 NOTE — DIETITIAN INITIAL EVALUATION ADULT. - OTHER CALCULATIONS
Ht (cm):   157.5cm Wt (kg): 140.6kg (dosing weight 02/04)   BMI: 56.7     IBW: 49.8kg +/- 10% %IBW: 282% UBW: 135.6kg %UBW: 104%

## 2021-02-05 NOTE — DIETITIAN INITIAL EVALUATION ADULT. - EDUCATION DIETARY MODIFICATIONS
Discussed with pt how to optimize PO intake and importance of protein intake. Weight loss education inappropriate at this time as pt still with poor PO intake. Education on follow-up as appropriate./(1) partially meets; needs review/practice

## 2021-02-05 NOTE — DIETITIAN INITIAL EVALUATION ADULT. - ORAL INTAKE PTA/DIET HISTORY
Pt reports poor appetite and PO intake x 1 week PTA due to not feeling well; reports she was not able to eat at all. States she lives with her sister who assists in ADLs and cooks PTA. Per diet recall pt reports she eats one large meal per day consisting of meat, potatoes, and non-starchy vegetable; states she likes to drink different types of juices and water PTA. Denies use of vitamins and states she occasionally drinks Boost nutritional supplement PTA.

## 2021-02-05 NOTE — CONSULT NOTE ADULT - SUBJECTIVE AND OBJECTIVE BOX
[facility] NEPHROLOGY SERVICES, [facility]  NEPHROLOGY AND HYPERTENSION  [address]  [address]  [place] [address]  [phone]    MD ROME KOROMA MD ANDREY GONCHARUK, MD MADHU KORRAPATI, MD YELENA ROSENBERG, MD BINNY KOSHY, MD CHRISTOPHER CAPUTO, MD EDWARD BOVER, MD      Information from chart:  "Patient is a 75y old  Female who presents with a chief complaint of short of breath (2021 18:35)    HPI:  74 yo F hx COPD on home O2, HTN, HLD, chronic lymphedema BIBA for complaints of generalized weakness and shortness of breath. Patient lives with sister who was on her way to work and did not feel comfortable leaving patient alone at home today. Patient reports increasing SOB and weakness over the last few weeks. Increased swelling and pain to lower extremities. Denies focal numbness/tingling. No chest pain. No cough.      Denies fever, chills or cough  Hx emphysema   BARBARA noted on labs. Patient on maintenance diuretic regimen; no recent change; no new medications;      (2021 10:38)   "    PAST MEDICAL & SURGICAL HISTORY:  COPD (chronic obstructive pulmonary disease)    HTN (hypertension)      FAMILY HISTORY:    Allergies    No Known Allergies    Intolerances      Home Medications:  Eliquis 5 mg oral tablet: 1 tab(s) orally 2 times a day (2021 12:29)  furosemide 20 mg oral tablet: 1 tab(s) orally once a day (2021 12:29)  gabapentin 100 mg oral capsule: 1 cap(s) orally 3 times a day (2021 12:29)  Metoprolol Succinate ER 25 mg oral tablet, extended release: 1 tab(s) orally once a day (2021 12:29)  Vitamin D2 50,000 intl units (1.25 mg) oral capsule: 1 cap(s) orally once a week (2021 12:29)    MEDICATIONS  (STANDING):  ALBUTerol    90 MICROgram(s) HFA Inhaler 1 Puff(s) Inhalation every 4 hours  heparin   Injectable 5000 Unit(s) SubCutaneous every 12 hours  methylPREDNISolone sodium succinate Injectable 40 milliGRAM(s) IV Push every 6 hours  sodium chloride 0.9%. 1000 milliLiter(s) (80 mL/Hr) IV Continuous <Continuous>  tiotropium 18 MICROgram(s) Capsule 1 Capsule(s) Inhalation daily    MEDICATIONS  (PRN):  acetaminophen 300 mG/codeine 30 mG 1 Tablet(s) Oral every 4 hours PRN Moderate Pain (4 - 6)    Vital Signs Last 24 Hrs  T(C): 36.8 (2021 20:34), Max: 36.9 (2021 17:06)  T(F): 98.2 (2021 20:34), Max: 98.4 (2021 17:06)  HR: 110 (2021 20:34) (102 - 120)  BP: 95/53 (2021 20:34) (80/44 - 159/106)  BP(mean): 67 (2021 20:34) (67 - 73)  RR: 17 (2021 20:34) (17 - 20)  SpO2: 100% (2021 20:34) (95% - 109%)    Daily Height in cm: 157.48 (2021 07:22)    Daily     CAPILLARY BLOOD GLUCOSE        PHYSICAL EXAM:      T(C): 36.8 (21 @ 20:34), Max: 36.9 (21 @ 17:06)  HR: 110 (21 @ 20:34) (102 - 120)  BP: 95/53 (21 @ 20:34) (80/44 - 159/106)  RR: 17 (21 @ 20:34) (17 - 20)  SpO2: 100% (21 @ 20:34) (95% - 109%)  Wt(kg): --  Lungs clear decreased BS bases  Heart S1S2  Abd soft NT ND  Extremities:   3+ lymphedema                  142  |  111<H>  |  102<H>  ----------------------------<  124<H>  4.6   |  23  |  2.29<H>    Ca    9.4      2021 07:49  Mg     2.7         TPro  7.1  /  Alb  3.0<L>  /  TBili  0.8  /  DBili  x   /  AST  28  /  ALT  42  /  AlkPhos  79                            12.8   10.50 )-----------( 191      ( 2021 07:49 )             41.2     Creatinine Trend: 2.29<--  Urinalysis Basic - ( 2021 08:43 )    Color: Basilia / Appearance: Clear / S.020 / pH: x  Gluc: x / Ketone: Trace  / Bili: Moderate / Urobili: 4 mg/dL   Blood: x / Protein: 15 mg/dL / Nitrite: Negative   Leuk Esterase: Trace / RBC: 0-2 /HPF / WBC 3-5   Sq Epi: x / Non Sq Epi: Few / Bacteria: Moderate      ABG - ( 2021 07:51 )  pH, Arterial: x     pH, Blood: 7.39  /  pCO2: 32    /  pO2: 77    / HCO3: 19    / Base Excess: -4.6  /  SaO2: 96            Trend Bun/Cr  21 @ 07:49  BUN/CR -  102<H> / 2.29<H>  19 @ 07:37  BUN/CR -  25<H> / 0.82  19 @ 08:54  BUN/CR -  29<H> / 0.88  04-10-19 @ 08:27  BUN/CR -  30<H> / 1.00  19 @ 07:58  BUN/CR -  14 / 0.70  19 @ 15:08  BUN/CR -  15 / 0.77              Assessment   BARBARA; pre renal azotemia;   Urine relatively bland  Post IV contrast on admission    Plan  Hold diuretic rx  Judicious IVF  Renal US  Will follow course.    Marc Alcantar MD
    JAZMIN IRELAND    [facility] TELE 04    Patient is a 75y old  Female who presents with a chief complaint of short of breath (2021 10:38)       Allergies    No Known Allergies    Intolerances        HPI:  74 yo F hx COPD on home O2, HTN, HLD, chronic lymphedema BIBA for complaints of generalized weakness and shortness of breath. Patient lives with sister who was on her way to work and did not feel comfortable leaving patient alone at home today. Patient reports increasing SOB and weakness over the last few weeks. Increased swelling and pain to lower extremities. Denies focal numbness/tingling. No chest pain. No cough.       (2021 10:38)      PAST MEDICAL & SURGICAL HISTORY:  COPD (chronic obstructive pulmonary disease)    HTN (hypertension)        FAMILY HISTORY:        MEDICATIONS   acetaminophen 300 mG/codeine 30 mG 1 Tablet(s) Oral every 4 hours PRN  ALBUTerol    90 MICROgram(s) HFA Inhaler 1 Puff(s) Inhalation every 4 hours  heparin   Injectable 5000 Unit(s) SubCutaneous every 12 hours  methylPREDNISolone sodium succinate Injectable 40 milliGRAM(s) IV Push every 6 hours  sodium chloride 0.9%. 1000 milliLiter(s) IV Continuous <Continuous>  tiotropium 18 MICROgram(s) Capsule 1 Capsule(s) Inhalation daily      Vital Signs Last 24 Hrs  T(C): 36.9 (2021 17:06), Max: 36.9 (2021 17:06)  T(F): 98.4 (2021 17:06), Max: 98.4 (2021 17:06)  HR: 106 (2021 17:06) (102 - 120)  BP: 100/59 (2021 17:06) (80/44 - 159/106)  BP(mean): 73 (2021 17:06) (67 - 73)  RR: 17 (2021 17:06) (17 - 20)  SpO2: 100% (2021 17:06) (95% - 109%)            LABS:                        12.8   10.50 )-----------( 191      ( 2021 07:49 )             41.2     02-03    142  |  111<H>  |  102<H>  ----------------------------<  124<H>  4.6   |  23  |  2.29<H>    Ca    9.4      2021 07:49  Mg     2.7     -    TPro  7.1  /  Alb  3.0<L>  /  TBili  0.8  /  DBili  x   /  AST  28  /  ALT  42  /  AlkPhos  79  -    PT/INR - ( 2021 07:49 )   PT: 14.2 sec;   INR: 1.24 ratio         PTT - ( 2021 07:49 )  PTT:27.1 sec  Urinalysis Basic - ( 2021 08:43 )    Color: Basilia / Appearance: Clear / S.020 / pH: x  Gluc: x / Ketone: Trace  / Bili: Moderate / Urobili: 4 mg/dL   Blood: x / Protein: 15 mg/dL / Nitrite: Negative   Leuk Esterase: Trace / RBC: 0-2 /HPF / WBC 3-5   Sq Epi: x / Non Sq Epi: Few / Bacteria: Moderate        ABG - ( 2021 07:51 )  pH, Arterial: x     pH, Blood: 7.39  /  pCO2: 32    /  pO2: 77    / HCO3: 19    / Base Excess: -4.6  /  SaO2: 96                  WBC:  WBC Count: 10.50 K/uL ( @ 07:49)      MICROBIOLOGY:  RECENT CULTURES:        CARDIAC MARKERS ( 2021 07:49 )  <.015 ng/mL / x     / x     / x     / x            PT/INR - ( 2021 07:49 )   PT: 14.2 sec;   INR: 1.24 ratio         PTT - ( 2021 07:49 )  PTT:27.1 sec    Sodium:  Sodium, Serum: 142 mmol/L ( @ 07:49)      2.29 mg/dL  @ 07:49      Hemoglobin:  Hemoglobin: 12.8 g/dL ( @ 07:49)      Platelets: Platelet Count - Automated: 191 K/uL ( @ 07:49)      LIVER FUNCTIONS - ( 2021 07:49 )  Alb: 3.0 g/dL / Pro: 7.1 gm/dL / ALK PHOS: 79 U/L / ALT: 42 U/L / AST: 28 U/L / GGT: x             Urinalysis Basic - ( 2021 08:43 )    Color: Basilia / Appearance: Clear / S.020 / pH: x  Gluc: x / Ketone: Trace  / Bili: Moderate / Urobili: 4 mg/dL   Blood: x / Protein: 15 mg/dL / Nitrite: Negative   Leuk Esterase: Trace / RBC: 0-2 /HPF / WBC 3-5   Sq Epi: x / Non Sq Epi: Few / Bacteria: Moderate        RADIOLOGY & ADDITIONAL STUDIES:  
Vascular Attending:      HPI:  76 yo F hx COPD on home O2, HTN, HLD, chronic lymphedema BIBA for complaints of generalized weakness and shortness of breath. Patient lives with sister who was on her way to work and did not feel comfortable leaving patient alone at home today. Patient reports increasing SOB and weakness over the last few weeks. Increased swelling and pain to lower extremities. Denies focal numbness/tingling. No chest pain. No cough.       (03 Feb 2021 10:38)        PAST MEDICAL & SURGICAL HISTORY:  COPD (chronic obstructive pulmonary disease)    HTN (hypertension)    Allergies  No Known Allergies      Vital Signs Last 24 Hrs  T(C): 36.8 (05 Feb 2021 13:43), Max: 37 (04 Feb 2021 18:56)  T(F): 98.2 (05 Feb 2021 13:43), Max: 98.6 (04 Feb 2021 18:56)  HR: 88 (05 Feb 2021 13:43) (71 - 108)  BP: 125/77 (05 Feb 2021 13:43) (92/52 - 127/68)  BP(mean): --  RR: 18 (05 Feb 2021 13:43) (18 - 20)  SpO2: 94% (05 Feb 2021 13:43) (94% - 99%)    Gen: NAD   HEENT: NCAT  CV: +S1S2  Lung: nonlabored  Abd: obese  Extremities: B/l feet warm and well perfused. Peripheral pulses intact bilaterally. Irregularly shaped ulcers to b/l posterior calves: L approximately 4x4cm, R approximately 6x6cm, clean with no gross purulence, erythema, drainage, or bleeding                                              LABS:                        10.6   14.94 )-----------( 151      ( 05 Feb 2021 06:29 )             34.0     02-05    143  |  113<H>  |  71<H>  ----------------------------<  148<H>  4.8   |  24  |  1.45<H>    Ca    8.8      05 Feb 2021 06:29  Phos  2.8     02-05  Mg     2.3     02-05      Culture Results:   No growth (02-03-21 @ 11:21)      Impression and Plan: 76yo female with morbid obesity seen with lower extremity lymphedema and posterior calf ulcers venous stasis vs. decubitus ulcers  Appear clean, no antibiotics indicated at present time per Dr Ruby  Recommend local wound care with xeroform gauze, cling and ACE wrap QOD  Continue medical management and supportive care  May Follow up as OP with wound care

## 2021-02-06 LAB
ALBUMIN SERPL ELPH-MCNC: 2.3 G/DL — LOW (ref 3.3–5)
ALP SERPL-CCNC: 63 U/L — SIGNIFICANT CHANGE UP (ref 40–120)
ALT FLD-CCNC: 44 U/L — SIGNIFICANT CHANGE UP (ref 12–78)
ANION GAP SERPL CALC-SCNC: 5 MMOL/L — SIGNIFICANT CHANGE UP (ref 5–17)
AST SERPL-CCNC: 33 U/L — SIGNIFICANT CHANGE UP (ref 15–37)
BILIRUB SERPL-MCNC: 0.2 MG/DL — SIGNIFICANT CHANGE UP (ref 0.2–1.2)
BUN SERPL-MCNC: 51 MG/DL — HIGH (ref 7–23)
CALCIUM SERPL-MCNC: 8.6 MG/DL — SIGNIFICANT CHANGE UP (ref 8.5–10.1)
CHLORIDE SERPL-SCNC: 114 MMOL/L — HIGH (ref 96–108)
CO2 SERPL-SCNC: 26 MMOL/L — SIGNIFICANT CHANGE UP (ref 22–31)
CREAT SERPL-MCNC: 1.18 MG/DL — SIGNIFICANT CHANGE UP (ref 0.5–1.3)
GLUCOSE SERPL-MCNC: 171 MG/DL — HIGH (ref 70–99)
HCT VFR BLD CALC: 32.4 % — LOW (ref 34.5–45)
HGB BLD-MCNC: 10 G/DL — LOW (ref 11.5–15.5)
MCHC RBC-ENTMCNC: 28 PG — SIGNIFICANT CHANGE UP (ref 27–34)
MCHC RBC-ENTMCNC: 30.9 GM/DL — LOW (ref 32–36)
MCV RBC AUTO: 90.8 FL — SIGNIFICANT CHANGE UP (ref 80–100)
NRBC # BLD: 0 /100 WBCS — SIGNIFICANT CHANGE UP (ref 0–0)
PLATELET # BLD AUTO: 153 K/UL — SIGNIFICANT CHANGE UP (ref 150–400)
POTASSIUM SERPL-MCNC: 4.9 MMOL/L — SIGNIFICANT CHANGE UP (ref 3.5–5.3)
POTASSIUM SERPL-SCNC: 4.9 MMOL/L — SIGNIFICANT CHANGE UP (ref 3.5–5.3)
PROT SERPL-MCNC: 5.6 GM/DL — LOW (ref 6–8.3)
RBC # BLD: 3.57 M/UL — LOW (ref 3.8–5.2)
RBC # FLD: 14.9 % — HIGH (ref 10.3–14.5)
SODIUM SERPL-SCNC: 145 MMOL/L — SIGNIFICANT CHANGE UP (ref 135–145)
WBC # BLD: 11.44 K/UL — HIGH (ref 3.8–10.5)
WBC # FLD AUTO: 11.44 K/UL — HIGH (ref 3.8–10.5)

## 2021-02-06 PROCEDURE — 99232 SBSQ HOSP IP/OBS MODERATE 35: CPT

## 2021-02-06 PROCEDURE — 99233 SBSQ HOSP IP/OBS HIGH 50: CPT

## 2021-02-06 RX ORDER — OXYCODONE AND ACETAMINOPHEN 5; 325 MG/1; MG/1
2 TABLET ORAL EVERY 6 HOURS
Refills: 0 | Status: DISCONTINUED | OUTPATIENT
Start: 2021-02-06 | End: 2021-02-06

## 2021-02-06 RX ORDER — HYDROXYZINE HCL 10 MG
25 TABLET ORAL ONCE
Refills: 0 | Status: DISCONTINUED | OUTPATIENT
Start: 2021-02-06 | End: 2021-02-09

## 2021-02-06 RX ORDER — SODIUM CHLORIDE 9 MG/ML
1000 INJECTION, SOLUTION INTRAVENOUS
Refills: 0 | Status: DISCONTINUED | OUTPATIENT
Start: 2021-02-06 | End: 2021-02-07

## 2021-02-06 RX ORDER — SODIUM CHLORIDE 0.65 %
1 AEROSOL, SPRAY (ML) NASAL
Refills: 0 | Status: DISCONTINUED | OUTPATIENT
Start: 2021-02-06 | End: 2021-02-09

## 2021-02-06 RX ORDER — FLUTICASONE PROPIONATE 50 MCG
1 SPRAY, SUSPENSION NASAL
Refills: 0 | Status: DISCONTINUED | OUTPATIENT
Start: 2021-02-06 | End: 2021-02-09

## 2021-02-06 RX ADMIN — ALBUTEROL 1 PUFF(S): 90 AEROSOL, METERED ORAL at 01:54

## 2021-02-06 RX ADMIN — OXYCODONE AND ACETAMINOPHEN 1 TABLET(S): 5; 325 TABLET ORAL at 08:40

## 2021-02-06 RX ADMIN — HEPARIN SODIUM 5000 UNIT(S): 5000 INJECTION INTRAVENOUS; SUBCUTANEOUS at 17:09

## 2021-02-06 RX ADMIN — ALBUTEROL 1 PUFF(S): 90 AEROSOL, METERED ORAL at 21:53

## 2021-02-06 RX ADMIN — Medication 3 MILLILITER(S): at 05:35

## 2021-02-06 RX ADMIN — AZITHROMYCIN 255 MILLIGRAM(S): 500 TABLET, FILM COATED ORAL at 14:17

## 2021-02-06 RX ADMIN — ALBUTEROL 1 PUFF(S): 90 AEROSOL, METERED ORAL at 08:41

## 2021-02-06 RX ADMIN — Medication 3 MILLILITER(S): at 01:03

## 2021-02-06 RX ADMIN — BUDESONIDE AND FORMOTEROL FUMARATE DIHYDRATE 2 PUFF(S): 160; 4.5 AEROSOL RESPIRATORY (INHALATION) at 17:12

## 2021-02-06 RX ADMIN — NYSTATIN CREAM 1 APPLICATION(S): 100000 CREAM TOPICAL at 17:09

## 2021-02-06 RX ADMIN — OXYCODONE AND ACETAMINOPHEN 1 TABLET(S): 5; 325 TABLET ORAL at 15:16

## 2021-02-06 RX ADMIN — OXYCODONE AND ACETAMINOPHEN 1 TABLET(S): 5; 325 TABLET ORAL at 02:36

## 2021-02-06 RX ADMIN — SODIUM CHLORIDE 50 MILLILITER(S): 9 INJECTION, SOLUTION INTRAVENOUS at 17:10

## 2021-02-06 RX ADMIN — OXYCODONE AND ACETAMINOPHEN 1 TABLET(S): 5; 325 TABLET ORAL at 21:53

## 2021-02-06 RX ADMIN — Medication 1 SPRAY(S): at 22:56

## 2021-02-06 RX ADMIN — ALBUTEROL 1 PUFF(S): 90 AEROSOL, METERED ORAL at 05:24

## 2021-02-06 RX ADMIN — Medication 3 MILLILITER(S): at 12:33

## 2021-02-06 RX ADMIN — Medication 3 MILLILITER(S): at 17:53

## 2021-02-06 RX ADMIN — NYSTATIN CREAM 1 APPLICATION(S): 100000 CREAM TOPICAL at 05:22

## 2021-02-06 RX ADMIN — HEPARIN SODIUM 5000 UNIT(S): 5000 INJECTION INTRAVENOUS; SUBCUTANEOUS at 05:22

## 2021-02-06 RX ADMIN — Medication 40 MILLIGRAM(S): at 21:52

## 2021-02-06 RX ADMIN — MORPHINE SULFATE 2 MILLIGRAM(S): 50 CAPSULE, EXTENDED RELEASE ORAL at 18:23

## 2021-02-06 RX ADMIN — SENNA PLUS 2 TABLET(S): 8.6 TABLET ORAL at 05:22

## 2021-02-06 RX ADMIN — SENNA PLUS 2 TABLET(S): 8.6 TABLET ORAL at 17:09

## 2021-02-06 RX ADMIN — Medication 1 SPRAY(S): at 22:55

## 2021-02-06 RX ADMIN — BUDESONIDE AND FORMOTEROL FUMARATE DIHYDRATE 2 PUFF(S): 160; 4.5 AEROSOL RESPIRATORY (INHALATION) at 05:23

## 2021-02-06 NOTE — PROGRESS NOTE ADULT - SUBJECTIVE AND OBJECTIVE BOX
JAZMIN IRELAND    LVS 1B 123 W    Patient is a 75y old  Female who presents with a chief complaint of short of breath (05 Feb 2021 21:00)       Allergies    No Known Allergies    Intolerances        HPI:  76 yo F hx COPD on home O2, HTN, HLD, chronic lymphedema BIBA for complaints of generalized weakness and shortness of breath. Patient lives with sister who was on her way to work and did not feel comfortable leaving patient alone at home today. Patient reports increasing SOB and weakness over the last few weeks. Increased swelling and pain to lower extremities. Denies focal numbness/tingling. No chest pain. No cough.       (03 Feb 2021 10:38)      PAST MEDICAL & SURGICAL HISTORY:  COPD (chronic obstructive pulmonary disease)    HTN (hypertension)        FAMILY HISTORY:        MEDICATIONS   ALBUTerol    90 MICROgram(s) HFA Inhaler 1 Puff(s) Inhalation every 4 hours  albuterol/ipratropium for Nebulization 3 milliLiter(s) Nebulizer every 6 hours  azithromycin  IVPB      azithromycin  IVPB 500 milliGRAM(s) IV Intermittent every 24 hours  budesonide 160 MICROgram(s)/formoterol 4.5 MICROgram(s) Inhaler 2 Puff(s) Inhalation two times a day  dextrose 5%. 1000 milliLiter(s) IV Continuous <Continuous>  heparin   Injectable 5000 Unit(s) SubCutaneous every 12 hours  hydrOXYzine hydrochloride 25 milliGRAM(s) Oral once PRN  methylPREDNISolone sodium succinate Injectable 40 milliGRAM(s) IV Push daily  morphine  - Injectable 2 milliGRAM(s) IV Push every 6 hours PRN  nystatin Cream 1 Application(s) Topical two times a day  oxycodone    5 mG/acetaminophen 325 mG 1 Tablet(s) Oral every 6 hours PRN  senna 2 Tablet(s) Oral two times a day  tiotropium 18 MICROgram(s) Capsule 1 Capsule(s) Inhalation daily      Vital Signs Last 24 Hrs  T(C): 37.7 (06 Feb 2021 07:09), Max: 37.7 (06 Feb 2021 07:09)  T(F): 99.8 (06 Feb 2021 07:09), Max: 99.8 (06 Feb 2021 07:09)  HR: 96 (06 Feb 2021 07:09) (84 - 108)  BP: 115/71 (06 Feb 2021 07:09) (105/65 - 125/77)  BP(mean): --  RR: 17 (06 Feb 2021 07:09) (17 - 18)  SpO2: 96% (06 Feb 2021 07:09) (94% - 98%)      02-05-21 @ 07:01  -  02-06-21 @ 07:00  --------------------------------------------------------  IN: 0 mL / OUT: 800 mL / NET: -800 mL            LABS:                        10.0   11.44 )-----------( 153      ( 06 Feb 2021 07:41 )             32.4     02-06    145  |  114<H>  |  51<H>  ----------------------------<  171<H>  4.9   |  26  |  1.18    Ca    8.6      06 Feb 2021 07:41  Phos  2.8     02-05  Mg     2.3     02-05    TPro  5.6<L>  /  Alb  2.3<L>  /  TBili  0.2  /  DBili  x   /  AST  33  /  ALT  44  /  AlkPhos  63  02-06              WBC:  WBC Count: 11.44 K/uL (02-06 @ 07:41)  WBC Count: 14.94 K/uL (02-05 @ 06:29)  WBC Count: 11.58 K/uL (02-04 @ 08:40)  WBC Count: 10.50 K/uL (02-03 @ 07:49)      MICROBIOLOGY:  RECENT CULTURES:  02-03 .Urine Catheterized XXXX XXXX   No growth                    Sodium:  Sodium, Serum: 145 mmol/L (02-06 @ 07:41)  Sodium, Serum: 143 mmol/L (02-05 @ 06:29)  Sodium, Serum: 145 mmol/L (02-04 @ 08:40)  Sodium, Serum: 142 mmol/L (02-03 @ 07:49)      1.18 mg/dL 02-06 @ 07:41  1.45 mg/dL 02-05 @ 06:29  1.76 mg/dL 02-04 @ 08:40  2.29 mg/dL 02-03 @ 07:49      Hemoglobin:  Hemoglobin: 10.0 g/dL (02-06 @ 07:41)  Hemoglobin: 10.6 g/dL (02-05 @ 06:29)  Hemoglobin: 10.3 g/dL (02-04 @ 08:40)  Hemoglobin: 12.8 g/dL (02-03 @ 07:49)      Platelets: Platelet Count - Automated: 153 K/uL (02-06 @ 07:41)  Platelet Count - Automated: 151 K/uL (02-05 @ 06:29)  Platelet Count - Automated: 148 K/uL (02-04 @ 08:40)  Platelet Count - Automated: 191 K/uL (02-03 @ 07:49)      LIVER FUNCTIONS - ( 06 Feb 2021 07:41 )  Alb: 2.3 g/dL / Pro: 5.6 gm/dL / ALK PHOS: 63 U/L / ALT: 44 U/L / AST: 33 U/L / GGT: x                 RADIOLOGY & ADDITIONAL STUDIES:

## 2021-02-06 NOTE — PROGRESS NOTE ADULT - SUBJECTIVE AND OBJECTIVE BOX
Patient is a 75y old  Female who presents with a chief complaint of short of breath (2021 09:52)      OVERNIGHT EVENTS:  none    MEDICATIONS  (STANDING):  ALBUTerol    90 MICROgram(s) HFA Inhaler 1 Puff(s) Inhalation every 4 hours  albuterol/ipratropium for Nebulization 3 milliLiter(s) Nebulizer every 6 hours  azithromycin  IVPB      azithromycin  IVPB 500 milliGRAM(s) IV Intermittent every 24 hours  budesonide 160 MICROgram(s)/formoterol 4.5 MICROgram(s) Inhaler 2 Puff(s) Inhalation two times a day  dextrose 5%. 1000 milliLiter(s) (50 mL/Hr) IV Continuous <Continuous>  heparin   Injectable 5000 Unit(s) SubCutaneous every 12 hours  methylPREDNISolone sodium succinate Injectable 40 milliGRAM(s) IV Push daily  nystatin Cream 1 Application(s) Topical two times a day  senna 2 Tablet(s) Oral two times a day  tiotropium 18 MICROgram(s) Capsule 1 Capsule(s) Inhalation daily    MEDICATIONS  (PRN):  hydrOXYzine hydrochloride 25 milliGRAM(s) Oral once PRN Itching  morphine  - Injectable 2 milliGRAM(s) IV Push every 6 hours PRN Severe Pain (7 - 10)  oxycodone    5 mG/acetaminophen 325 mG 1 Tablet(s) Oral every 6 hours PRN Moderate Pain (4 - 6)    Allergies    No Known Allergies    Intolerances        SUBJECTIVE: in bed in NAD, no acute events overnight     Vital Signs Last 24 Hrs  T(C): 37.7 (2021 07:09), Max: 37.7 (2021 07:09)  T(F): 99.8 (2021 07:09), Max: 99.8 (2021 07:09)  HR: 96 (2021 07:09) (84 - 108)  BP: 115/71 (2021 07:09) (105/65 - 125/77)  BP(mean): --  RR: 17 (2021 07:09) (17 - 18)  SpO2: 96% (2021 07:09) (94% - 98%)  PHYSICAL EXAM:  GENERAL: NAD, well-groomed, well-developed, morbidly obese  HEAD:  Atraumatic, Normocephalic  EYES: EOMI, PERRLA, conjunctiva and sclera clear  ENMT: No tonsillar erythema, exudates, or enlargement; Moist mucous membranes, Good dentition, No lesions  NECK: Supple, No JVD, Normal thyroid  CHEST/LUNG: mild scattered wheezing   HEART: Regular rate and rhythm; No murmurs, rubs, or gallops  ABDOMEN: Soft, Nontender, Nondistended; Bowel sounds present  EXTREMITIES:  2+ Peripheral Pulses, No clubbing, cyanosis, or edema BL LE  SKIN: No rashes or lesions  NERVOUS SYSTEM:  Alert & Oriented X3, Good concentration; Motor Strength 5/5 B/L upper and 3-/5  lower extremities; lymphedema with weeping lesion on the sandra of legs   DTRs 2+ intact and symmetric, sensation intact BL    LABS:                                   10.0   11.44 )-----------( 153      ( 2021 07:41 )             32.4   02-06    145  |  114<H>  |  51<H>  ----------------------------<  171<H>  4.9   |  26  |  1.18    Ca    8.6      2021 07:41  Phos  2.8     02-05  Mg     2.3     02-05    TPro  5.6<L>  /  Alb  2.3<L>  /  TBili  0.2  /  DBili  x   /  AST  33  /  ALT  44  /  AlkPhos  63  02-06      Urinalysis Basic - ( 2021 08:43 )    Color: Basilia / Appearance: Clear / S.020 / pH: x  Gluc: x / Ketone: Trace  / Bili: Moderate / Urobili: 4 mg/dL   Blood: x / Protein: 15 mg/dL / Nitrite: Negative   Leuk Esterase: Trace / RBC: 0-2 /HPF / WBC 3-5   Sq Epi: x / Non Sq Epi: Few / Bacteria: Moderate      Cultures;   CAPILLARY BLOOD GLUCOSE        Lipid panel:     CARDIAC MARKERS ( 2021 07:49 )  <.015 ng/mL / x     / x     / x     / x            RADIOLOGY & ADDITIONAL TESTS:      Imaging Personally Reviewed:  [ x] YES      Consultant(s) Notes Reviewed:  [x ] YES     Care Discussed with [x ] Consultants [X ] Patient [x ] Family  [x ]    [x ]  Other; RN Patient is a 75y old  Female who presents with a chief complaint of short of breath (2021 09:52)      OVERNIGHT EVENTS:  none    MEDICATIONS  (STANDING):  ALBUTerol    90 MICROgram(s) HFA Inhaler 1 Puff(s) Inhalation every 4 hours  albuterol/ipratropium for Nebulization 3 milliLiter(s) Nebulizer every 6 hours  azithromycin  IVPB      azithromycin  IVPB 500 milliGRAM(s) IV Intermittent every 24 hours  budesonide 160 MICROgram(s)/formoterol 4.5 MICROgram(s) Inhaler 2 Puff(s) Inhalation two times a day  dextrose 5%. 1000 milliLiter(s) (50 mL/Hr) IV Continuous <Continuous>  heparin   Injectable 5000 Unit(s) SubCutaneous every 12 hours  methylPREDNISolone sodium succinate Injectable 40 milliGRAM(s) IV Push daily  nystatin Cream 1 Application(s) Topical two times a day  senna 2 Tablet(s) Oral two times a day  tiotropium 18 MICROgram(s) Capsule 1 Capsule(s) Inhalation daily    MEDICATIONS  (PRN):  hydrOXYzine hydrochloride 25 milliGRAM(s) Oral once PRN Itching  morphine  - Injectable 2 milliGRAM(s) IV Push every 6 hours PRN Severe Pain (7 - 10)  oxycodone    5 mG/acetaminophen 325 mG 1 Tablet(s) Oral every 6 hours PRN Moderate Pain (4 - 6)    Allergies    No Known Allergies    Intolerances        SUBJECTIVE: in bed in NAD, no acute events overnight     Vital Signs Last 24 Hrs  T(C): 37.7 (2021 07:09), Max: 37.7 (2021 07:09)  T(F): 99.8 (2021 07:09), Max: 99.8 (2021 07:09)  HR: 96 (2021 07:09) (84 - 108)  BP: 115/71 (2021 07:09) (105/65 - 125/77)  BP(mean): --  RR: 17 (2021 07:09) (17 - 18)  SpO2: 96% (2021 07:09) (94% - 98%)  PHYSICAL EXAM:  GENERAL: NAD, well-groomed, well-developed, morbidly obese  HEAD:  Atraumatic, Normocephalic  EYES: EOMI, PERRLA, conjunctiva and sclera clear  ENMT: No tonsillar erythema, exudates, or enlargement; Moist mucous membranes, Good dentition, No lesions  NECK: Supple, No JVD, Normal thyroid  CHEST/LUNG: improved wheezing   HEART: Regular rate and rhythm; No murmurs, rubs, or gallops  ABDOMEN: Soft, Nontender, Nondistended; Bowel sounds present  EXTREMITIES:  2+ Peripheral Pulses, No clubbing, cyanosis, or edema BL LE  SKIN: No rashes or lesions  NERVOUS SYSTEM:  Alert & Oriented X3, Good concentration; Motor Strength 5/5 B/L upper and 3-/5  lower extremities; lymphedema with weeping lesion on the sandra of legs   DTRs 2+ intact and symmetric, sensation intact BL    LABS:                                   10.0   11.44 )-----------( 153      ( 2021 07:41 )             32.4   02-06    145  |  114<H>  |  51<H>  ----------------------------<  171<H>  4.9   |  26  |  1.18    Ca    8.6      2021 07:41  Phos  2.8     02-05  Mg     2.3     02-05    TPro  5.6<L>  /  Alb  2.3<L>  /  TBili  0.2  /  DBili  x   /  AST  33  /  ALT  44  /  AlkPhos  63  02-06      Urinalysis Basic - ( 2021 08:43 )    Color: Basilia / Appearance: Clear / S.020 / pH: x  Gluc: x / Ketone: Trace  / Bili: Moderate / Urobili: 4 mg/dL   Blood: x / Protein: 15 mg/dL / Nitrite: Negative   Leuk Esterase: Trace / RBC: 0-2 /HPF / WBC 3-5   Sq Epi: x / Non Sq Epi: Few / Bacteria: Moderate      Cultures;   CAPILLARY BLOOD GLUCOSE        Lipid panel:     CARDIAC MARKERS ( 2021 07:49 )  <.015 ng/mL / x     / x     / x     / x            RADIOLOGY & ADDITIONAL TESTS:      Imaging Personally Reviewed:  [ x] YES      Consultant(s) Notes Reviewed:  [x ] YES     Care Discussed with [x ] Consultants [X ] Patient [x ] Family  [x ]    [x ]  Other; RN

## 2021-02-07 PROCEDURE — 99232 SBSQ HOSP IP/OBS MODERATE 35: CPT

## 2021-02-07 PROCEDURE — 99233 SBSQ HOSP IP/OBS HIGH 50: CPT

## 2021-02-07 RX ADMIN — Medication 3 MILLILITER(S): at 23:53

## 2021-02-07 RX ADMIN — OXYCODONE AND ACETAMINOPHEN 1 TABLET(S): 5; 325 TABLET ORAL at 08:47

## 2021-02-07 RX ADMIN — SENNA PLUS 2 TABLET(S): 8.6 TABLET ORAL at 05:16

## 2021-02-07 RX ADMIN — MORPHINE SULFATE 2 MILLIGRAM(S): 50 CAPSULE, EXTENDED RELEASE ORAL at 22:08

## 2021-02-07 RX ADMIN — HEPARIN SODIUM 5000 UNIT(S): 5000 INJECTION INTRAVENOUS; SUBCUTANEOUS at 18:07

## 2021-02-07 RX ADMIN — Medication 1 SPRAY(S): at 05:13

## 2021-02-07 RX ADMIN — Medication 3 MILLILITER(S): at 05:47

## 2021-02-07 RX ADMIN — HEPARIN SODIUM 5000 UNIT(S): 5000 INJECTION INTRAVENOUS; SUBCUTANEOUS at 05:16

## 2021-02-07 RX ADMIN — Medication 1 SPRAY(S): at 05:17

## 2021-02-07 RX ADMIN — ALBUTEROL 1 PUFF(S): 90 AEROSOL, METERED ORAL at 08:49

## 2021-02-07 RX ADMIN — ALBUTEROL 1 PUFF(S): 90 AEROSOL, METERED ORAL at 12:26

## 2021-02-07 RX ADMIN — ALBUTEROL 1 PUFF(S): 90 AEROSOL, METERED ORAL at 21:58

## 2021-02-07 RX ADMIN — NYSTATIN CREAM 1 APPLICATION(S): 100000 CREAM TOPICAL at 05:11

## 2021-02-07 RX ADMIN — Medication 1 SPRAY(S): at 18:08

## 2021-02-07 RX ADMIN — BUDESONIDE AND FORMOTEROL FUMARATE DIHYDRATE 2 PUFF(S): 160; 4.5 AEROSOL RESPIRATORY (INHALATION) at 18:07

## 2021-02-07 RX ADMIN — MORPHINE SULFATE 2 MILLIGRAM(S): 50 CAPSULE, EXTENDED RELEASE ORAL at 12:58

## 2021-02-07 RX ADMIN — Medication 3 MILLILITER(S): at 11:15

## 2021-02-07 RX ADMIN — Medication 1 SPRAY(S): at 23:05

## 2021-02-07 RX ADMIN — BUDESONIDE AND FORMOTEROL FUMARATE DIHYDRATE 2 PUFF(S): 160; 4.5 AEROSOL RESPIRATORY (INHALATION) at 05:11

## 2021-02-07 RX ADMIN — AZITHROMYCIN 255 MILLIGRAM(S): 500 TABLET, FILM COATED ORAL at 14:04

## 2021-02-07 RX ADMIN — MORPHINE SULFATE 2 MILLIGRAM(S): 50 CAPSULE, EXTENDED RELEASE ORAL at 02:48

## 2021-02-07 RX ADMIN — NYSTATIN CREAM 1 APPLICATION(S): 100000 CREAM TOPICAL at 18:08

## 2021-02-07 RX ADMIN — ALBUTEROL 1 PUFF(S): 90 AEROSOL, METERED ORAL at 18:07

## 2021-02-07 RX ADMIN — Medication 3 MILLILITER(S): at 00:37

## 2021-02-07 RX ADMIN — SENNA PLUS 2 TABLET(S): 8.6 TABLET ORAL at 18:04

## 2021-02-07 RX ADMIN — ALBUTEROL 1 PUFF(S): 90 AEROSOL, METERED ORAL at 05:11

## 2021-02-07 RX ADMIN — Medication 40 MILLIGRAM(S): at 21:59

## 2021-02-07 RX ADMIN — Medication 5 MILLIGRAM(S): at 21:59

## 2021-02-07 RX ADMIN — Medication 1 SPRAY(S): at 12:40

## 2021-02-07 RX ADMIN — Medication 3 MILLILITER(S): at 18:31

## 2021-02-07 RX ADMIN — OXYCODONE AND ACETAMINOPHEN 1 TABLET(S): 5; 325 TABLET ORAL at 18:05

## 2021-02-07 NOTE — PROGRESS NOTE ADULT - SUBJECTIVE AND OBJECTIVE BOX
Patient is a 75y old  Female who presents with a chief complaint of short of breath (2021 09:52)      OVERNIGHT EVENTS:  none      MEDICATIONS  (STANDING):  ALBUTerol    90 MICROgram(s) HFA Inhaler 1 Puff(s) Inhalation every 4 hours  albuterol/ipratropium for Nebulization 3 milliLiter(s) Nebulizer every 6 hours  azithromycin  IVPB      azithromycin  IVPB 500 milliGRAM(s) IV Intermittent every 24 hours  budesonide 160 MICROgram(s)/formoterol 4.5 MICROgram(s) Inhaler 2 Puff(s) Inhalation two times a day  fluticasone propionate 50 MICROgram(s)/spray Nasal Spray 1 Spray(s) Both Nostrils two times a day  heparin   Injectable 5000 Unit(s) SubCutaneous every 12 hours  methylPREDNISolone sodium succinate Injectable 40 milliGRAM(s) IV Push daily  nystatin Cream 1 Application(s) Topical two times a day  senna 2 Tablet(s) Oral two times a day  sodium chloride 0.65% Nasal 1 Spray(s) Both Nostrils four times a day  tiotropium 18 MICROgram(s) Capsule 1 Capsule(s) Inhalation daily    MEDICATIONS  (PRN):  hydrOXYzine hydrochloride 25 milliGRAM(s) Oral once PRN Itching  morphine  - Injectable 2 milliGRAM(s) IV Push every 6 hours PRN Severe Pain (7 - 10)  oxycodone    5 mG/acetaminophen 325 mG 1 Tablet(s) Oral every 6 hours PRN Moderate Pain (4 - 6)  Allergies    No Known Allergies    Intolerances        SUBJECTIVE: in bed in NAD, no acute events overnight     Vital Signs Last 24 Hrs  T(C): 36.8 (2021 05:56), Max: 36.8 (2021 00:07)  T(F): 98.2 (2021 05:56), Max: 98.2 (2021 00:07)  HR: 101 (2021 05:56) (89 - 114)  BP: 103/55 (2021 05:56) (100/65 - 141/68)  BP(mean): --  RR: 18 (2021 05:56) (18 - 18)  SpO2: 99% (2021 05:56) (96% - 99%)  PHYSICAL EXAM:  GENERAL: NAD, well-groomed, well-developed, morbidly obese  HEAD:  Atraumatic, Normocephalic  EYES: EOMI, PERRLA, conjunctiva and sclera clear  ENMT: No tonsillar erythema, exudates, or enlargement; Moist mucous membranes, Good dentition, No lesions  NECK: Supple, No JVD, Normal thyroid  CHEST/LUNG: improved wheezing   HEART: Regular rate and rhythm; No murmurs, rubs, or gallops  ABDOMEN: Soft, Nontender, Nondistended; Bowel sounds present  EXTREMITIES:  2+ Peripheral Pulses, No clubbing, cyanosis, or edema BL LE  SKIN: No rashes or lesions  NERVOUS SYSTEM:  Alert & Oriented X3, Good concentration; Motor Strength 5/5 B/L upper and 3-/5  lower extremities; lymphedema with weeping lesion on the sandra of legs   DTRs 2+ intact and symmetric, sensation intact BL    LABS:                                            10.0   11.44 )-----------( 153      ( 2021 07:41 )             32.4   02-06    145  |  114<H>  |  51<H>  ----------------------------<  171<H>  4.9   |  26  |  1.18    Ca    8.6      2021 07:41    TPro  5.6<L>  /  Alb  2.3<L>  /  TBili  0.2  /  DBili  x   /  AST  33  /  ALT  44  /  AlkPhos  63  02-06        Urinalysis Basic - ( 2021 08:43 )    Color: Basilia / Appearance: Clear / S.020 / pH: x  Gluc: x / Ketone: Trace  / Bili: Moderate / Urobili: 4 mg/dL   Blood: x / Protein: 15 mg/dL / Nitrite: Negative   Leuk Esterase: Trace / RBC: 0-2 /HPF / WBC 3-5   Sq Epi: x / Non Sq Epi: Few / Bacteria: Moderate      Cultures;   CAPILLARY BLOOD GLUCOSE        Lipid panel:     CARDIAC MARKERS ( 2021 07:49 )  <.015 ng/mL / x     / x     / x     / x            RADIOLOGY & ADDITIONAL TESTS:      Imaging Personally Reviewed:  [ x] YES      Consultant(s) Notes Reviewed:  [x ] YES     Care Discussed with [x ] Consultants [X ] Patient [x ] Family  [x ]    [x ]  Other; RN Patient is a 75y old  Female who presents with a chief complaint of short of breath (2021 09:52)      OVERNIGHT EVENTS:  none      MEDICATIONS  (STANDING):  ALBUTerol    90 MICROgram(s) HFA Inhaler 1 Puff(s) Inhalation every 4 hours  albuterol/ipratropium for Nebulization 3 milliLiter(s) Nebulizer every 6 hours  azithromycin  IVPB      azithromycin  IVPB 500 milliGRAM(s) IV Intermittent every 24 hours  budesonide 160 MICROgram(s)/formoterol 4.5 MICROgram(s) Inhaler 2 Puff(s) Inhalation two times a day  fluticasone propionate 50 MICROgram(s)/spray Nasal Spray 1 Spray(s) Both Nostrils two times a day  heparin   Injectable 5000 Unit(s) SubCutaneous every 12 hours  methylPREDNISolone sodium succinate Injectable 40 milliGRAM(s) IV Push daily  nystatin Cream 1 Application(s) Topical two times a day  senna 2 Tablet(s) Oral two times a day  sodium chloride 0.65% Nasal 1 Spray(s) Both Nostrils four times a day  tiotropium 18 MICROgram(s) Capsule 1 Capsule(s) Inhalation daily    MEDICATIONS  (PRN):  hydrOXYzine hydrochloride 25 milliGRAM(s) Oral once PRN Itching  morphine  - Injectable 2 milliGRAM(s) IV Push every 6 hours PRN Severe Pain (7 - 10)  oxycodone    5 mG/acetaminophen 325 mG 1 Tablet(s) Oral every 6 hours PRN Moderate Pain (4 - 6)  Allergies    No Known Allergies    Intolerances        SUBJECTIVE: in bed in NAD, no acute events overnight     Vital Signs Last 24 Hrs  T(C): 36.8 (2021 05:56), Max: 36.8 (2021 00:07)  T(F): 98.2 (2021 05:56), Max: 98.2 (2021 00:07)  HR: 101 (2021 05:56) (89 - 114)  BP: 103/55 (2021 05:56) (100/65 - 141/68)  BP(mean): --  RR: 18 (2021 05:56) (18 - 18)  SpO2: 99% (2021 05:56) (96% - 99%)  PHYSICAL EXAM:  GENERAL: NAD, well-groomed, well-developed, morbidly obese  HEAD:  Atraumatic, Normocephalic  EYES: EOMI, PERRLA, conjunctiva and sclera clear  ENMT: No tonsillar erythema, exudates, or enlargement; Moist mucous membranes, Good dentition, No lesions  NECK: Supple, No JVD, Normal thyroid  CHEST/LUNG: no wheezing   HEART: Regular rate and rhythm; No murmurs, rubs, or gallops  ABDOMEN: Soft, Nontender, Nondistended; Bowel sounds present  EXTREMITIES:  2+ Peripheral Pulses, No clubbing, cyanosis, or edema BL LE  SKIN: No rashes or lesions  NERVOUS SYSTEM:  Alert & Oriented X3, Good concentration; Motor Strength 5/5 B/L upper and 3-/5  lower extremities; lymphedema with weeping lesion on the sandra of legs   DTRs 2+ intact and symmetric, sensation intact BL    LABS:                                            10.0   11.44 )-----------( 153      ( 2021 07:41 )             32.4   02-06    145  |  114<H>  |  51<H>  ----------------------------<  171<H>  4.9   |  26  |  1.18    Ca    8.6      2021 07:41    TPro  5.6<L>  /  Alb  2.3<L>  /  TBili  0.2  /  DBili  x   /  AST  33  /  ALT  44  /  AlkPhos  63  02-06        Urinalysis Basic - ( 2021 08:43 )    Color: Basilia / Appearance: Clear / S.020 / pH: x  Gluc: x / Ketone: Trace  / Bili: Moderate / Urobili: 4 mg/dL   Blood: x / Protein: 15 mg/dL / Nitrite: Negative   Leuk Esterase: Trace / RBC: 0-2 /HPF / WBC 3-5   Sq Epi: x / Non Sq Epi: Few / Bacteria: Moderate      Cultures;   CAPILLARY BLOOD GLUCOSE        Lipid panel:     CARDIAC MARKERS ( 2021 07:49 )  <.015 ng/mL / x     / x     / x     / x            RADIOLOGY & ADDITIONAL TESTS:      Imaging Personally Reviewed:  [ x] YES      Consultant(s) Notes Reviewed:  [x ] YES     Care Discussed with [x ] Consultants [X ] Patient [x ] Family  [x ]    [x ]  Other; RN

## 2021-02-07 NOTE — PROGRESS NOTE ADULT - ATTENDING COMMENTS
updated sister at   dispo  2/7/2021 patient  is very resistant to participating with improving her medical state.  This is corroborated by her sister who lives with her.  The pt refuses to walk ,  she doesn't want her legs wrapped.  I have encouraged her and d/w her an d her sister  that if she chooses not to participate in PT or improve her lymphedema she will either go home or will need long term nursing home . updated sister at   dispo  2/7/2021 patient  is very resistant to participating with improving her medical state.  This is corroborated by her sister who lives with her.  The pt refuses to walk ,  she doesn't want her legs wrapped.  I have encouraged her and d/w her an d her sister  that if she chooses not to participate in PT or improve her lymphedema she will either go home or will need long term nursing home .  2/7/2021 pt is motivated to go rehab after long d/w her

## 2021-02-07 NOTE — PROGRESS NOTE ADULT - PROBLEM SELECTOR PLAN 2
steroids  duoneb   cta chest negative for infiltrate and PE  2/3/2021 pulm consult noted  2/7/2021 titrate steroids steroids  duoneb   cta chest negative for infiltrate and PE  2/3/2021 pulm consult noted  2/7/2021  change to oral steroids  in am

## 2021-02-07 NOTE — PROGRESS NOTE ADULT - SUBJECTIVE AND OBJECTIVE BOX
JAZMIN [facility]    [address]    Patient is a 75y old  Female who presents with a chief complaint of short of breath (07 Feb 2021 10:59)       Allergies    No Known Allergies    Intolerances        HPI:  76 yo F hx COPD on home O2, HTN, HLD, chronic lymphedema BIBA for complaints of generalized weakness and shortness of breath. Patient lives with sister who was on her way to work and did not feel comfortable leaving patient alone at home today. Patient reports increasing SOB and weakness over the last few weeks. Increased swelling and pain to lower extremities. Denies focal numbness/tingling. No chest pain. No cough.       (03 Feb 2021 10:38)      PAST MEDICAL & SURGICAL HISTORY:  COPD (chronic obstructive pulmonary disease)    HTN (hypertension)        FAMILY HISTORY:        MEDICATIONS   ALBUTerol    90 MICROgram(s) HFA Inhaler 1 Puff(s) Inhalation every 4 hours  albuterol/ipratropium for Nebulization 3 milliLiter(s) Nebulizer every 6 hours  azithromycin  IVPB      azithromycin  IVPB 500 milliGRAM(s) IV Intermittent every 24 hours  budesonide 160 MICROgram(s)/formoterol 4.5 MICROgram(s) Inhaler 2 Puff(s) Inhalation two times a day  fluticasone propionate 50 MICROgram(s)/spray Nasal Spray 1 Spray(s) Both Nostrils two times a day  heparin   Injectable 5000 Unit(s) SubCutaneous every 12 hours  hydrOXYzine hydrochloride 25 milliGRAM(s) Oral once PRN  methylPREDNISolone sodium succinate Injectable 40 milliGRAM(s) IV Push daily  morphine  - Injectable 2 milliGRAM(s) IV Push every 6 hours PRN  nystatin Cream 1 Application(s) Topical two times a day  oxycodone    5 mG/acetaminophen 325 mG 1 Tablet(s) Oral every 6 hours PRN  senna 2 Tablet(s) Oral two times a day  sodium chloride 0.65% Nasal 1 Spray(s) Both Nostrils four times a day  tiotropium 18 MICROgram(s) Capsule 1 Capsule(s) Inhalation daily      Vital Signs Last 24 Hrs  T(C): 36.6 (07 Feb 2021 12:00), Max: 36.8 (07 Feb 2021 00:07)  T(F): 97.8 (07 Feb 2021 12:00), Max: 98.2 (07 Feb 2021 00:07)  HR: 107 (07 Feb 2021 12:00) (101 - 114)  BP: 114/55 (07 Feb 2021 12:00) (100/65 - 114/55)  BP(mean): --  RR: 18 (07 Feb 2021 12:00) (18 - 18)  SpO2: 97% (07 Feb 2021 12:00) (96% - 99%)      02-06-21 @ 07:01  -  02-07-21 @ 07:00  --------------------------------------------------------  IN: 250 mL / OUT: 1500 mL / NET: -1250 mL            LABS:                        10.0   11.44 )-----------( 153      ( 06 Feb 2021 07:41 )             32.4     02-06    145  |  114<H>  |  51<H>  ----------------------------<  171<H>  4.9   |  26  |  1.18    Ca    8.6      06 Feb 2021 07:41    TPro  5.6<L>  /  Alb  2.3<L>  /  TBili  0.2  /  DBili  x   /  AST  33  /  ALT  44  /  AlkPhos  63  02-06              WBC:  WBC Count: 11.44 K/uL (02-06 @ 07:41)  WBC Count: 14.94 K/uL (02-05 @ 06:29)  WBC Count: 11.58 K/uL (02-04 @ 08:40)      MICROBIOLOGY:  RECENT CULTURES:  02-03 .Urine Catheterized XXXX XXXX   No growth                    Sodium:  Sodium, Serum: 145 mmol/L (02-06 @ 07:41)  Sodium, Serum: 143 mmol/L (02-05 @ 06:29)  Sodium, Serum: 145 mmol/L (02-04 @ 08:40)      1.18 mg/dL 02-06 @ 07:41  1.45 mg/dL 02-05 @ 06:29  1.76 mg/dL 02-04 @ 08:40      Hemoglobin:  Hemoglobin: 10.0 g/dL (02-06 @ 07:41)  Hemoglobin: 10.6 g/dL (02-05 @ 06:29)  Hemoglobin: 10.3 g/dL (02-04 @ 08:40)      Platelets: Platelet Count - Automated: 153 K/uL (02-06 @ 07:41)  Platelet Count - Automated: 151 K/uL (02-05 @ 06:29)  Platelet Count - Automated: 148 K/uL (02-04 @ 08:40)      LIVER FUNCTIONS - ( 06 Feb 2021 07:41 )  Alb: 2.3 g/dL / Pro: 5.6 gm/dL / ALK PHOS: 63 U/L / ALT: 44 U/L / AST: 33 U/L / GGT: x                 RADIOLOGY & ADDITIONAL STUDIES:

## 2021-02-08 LAB
ANION GAP SERPL CALC-SCNC: 2 MMOL/L — LOW (ref 5–17)
BUN SERPL-MCNC: 23 MG/DL — SIGNIFICANT CHANGE UP (ref 7–23)
CALCIUM SERPL-MCNC: 8.4 MG/DL — LOW (ref 8.5–10.1)
CHLORIDE SERPL-SCNC: 104 MMOL/L — SIGNIFICANT CHANGE UP (ref 96–108)
CO2 SERPL-SCNC: 32 MMOL/L — HIGH (ref 22–31)
CREAT SERPL-MCNC: 0.85 MG/DL — SIGNIFICANT CHANGE UP (ref 0.5–1.3)
FLUAV AG NPH QL: SIGNIFICANT CHANGE UP
FLUBV AG NPH QL: SIGNIFICANT CHANGE UP
GLUCOSE SERPL-MCNC: 165 MG/DL — HIGH (ref 70–99)
HCT VFR BLD CALC: 33.8 % — LOW (ref 34.5–45)
HGB BLD-MCNC: 10.5 G/DL — LOW (ref 11.5–15.5)
MAGNESIUM SERPL-MCNC: 1.6 MG/DL — SIGNIFICANT CHANGE UP (ref 1.6–2.6)
MCHC RBC-ENTMCNC: 27.5 PG — SIGNIFICANT CHANGE UP (ref 27–34)
MCHC RBC-ENTMCNC: 31.1 GM/DL — LOW (ref 32–36)
MCV RBC AUTO: 88.5 FL — SIGNIFICANT CHANGE UP (ref 80–100)
NRBC # BLD: 0 /100 WBCS — SIGNIFICANT CHANGE UP (ref 0–0)
PHOSPHATE SERPL-MCNC: 2.4 MG/DL — LOW (ref 2.5–4.5)
PLATELET # BLD AUTO: 157 K/UL — SIGNIFICANT CHANGE UP (ref 150–400)
POTASSIUM SERPL-MCNC: 5.1 MMOL/L — SIGNIFICANT CHANGE UP (ref 3.5–5.3)
POTASSIUM SERPL-SCNC: 5.1 MMOL/L — SIGNIFICANT CHANGE UP (ref 3.5–5.3)
RBC # BLD: 3.82 M/UL — SIGNIFICANT CHANGE UP (ref 3.8–5.2)
RBC # FLD: 14.5 % — SIGNIFICANT CHANGE UP (ref 10.3–14.5)
SARS-COV-2 RNA SPEC QL NAA+PROBE: SIGNIFICANT CHANGE UP
SODIUM SERPL-SCNC: 138 MMOL/L — SIGNIFICANT CHANGE UP (ref 135–145)
WBC # BLD: 10.74 K/UL — HIGH (ref 3.8–10.5)
WBC # FLD AUTO: 10.74 K/UL — HIGH (ref 3.8–10.5)

## 2021-02-08 PROCEDURE — 99232 SBSQ HOSP IP/OBS MODERATE 35: CPT

## 2021-02-08 RX ORDER — POLYETHYLENE GLYCOL 3350 17 G/17G
17 POWDER, FOR SOLUTION ORAL DAILY
Refills: 0 | Status: DISCONTINUED | OUTPATIENT
Start: 2021-02-08 | End: 2021-02-09

## 2021-02-08 RX ORDER — MAGNESIUM SULFATE 500 MG/ML
2 VIAL (ML) INJECTION ONCE
Refills: 0 | Status: DISCONTINUED | OUTPATIENT
Start: 2021-02-08 | End: 2021-02-08

## 2021-02-08 RX ORDER — LACTULOSE 10 G/15ML
10 SOLUTION ORAL ONCE
Refills: 0 | Status: COMPLETED | OUTPATIENT
Start: 2021-02-08 | End: 2021-02-08

## 2021-02-08 RX ORDER — MAGNESIUM OXIDE 400 MG ORAL TABLET 241.3 MG
400 TABLET ORAL
Refills: 0 | Status: DISCONTINUED | OUTPATIENT
Start: 2021-02-08 | End: 2021-02-09

## 2021-02-08 RX ADMIN — Medication 3 MILLILITER(S): at 22:46

## 2021-02-08 RX ADMIN — ALBUTEROL 1 PUFF(S): 90 AEROSOL, METERED ORAL at 18:13

## 2021-02-08 RX ADMIN — ALBUTEROL 1 PUFF(S): 90 AEROSOL, METERED ORAL at 03:06

## 2021-02-08 RX ADMIN — OXYCODONE AND ACETAMINOPHEN 1 TABLET(S): 5; 325 TABLET ORAL at 05:18

## 2021-02-08 RX ADMIN — Medication 1 SPRAY(S): at 05:15

## 2021-02-08 RX ADMIN — Medication 3 MILLILITER(S): at 06:56

## 2021-02-08 RX ADMIN — NYSTATIN CREAM 1 APPLICATION(S): 100000 CREAM TOPICAL at 05:15

## 2021-02-08 RX ADMIN — AZITHROMYCIN 255 MILLIGRAM(S): 500 TABLET, FILM COATED ORAL at 14:11

## 2021-02-08 RX ADMIN — ALBUTEROL 1 PUFF(S): 90 AEROSOL, METERED ORAL at 14:11

## 2021-02-08 RX ADMIN — HEPARIN SODIUM 5000 UNIT(S): 5000 INJECTION INTRAVENOUS; SUBCUTANEOUS at 18:13

## 2021-02-08 RX ADMIN — Medication 1 SPRAY(S): at 18:11

## 2021-02-08 RX ADMIN — MAGNESIUM OXIDE 400 MG ORAL TABLET 400 MILLIGRAM(S): 241.3 TABLET ORAL at 21:36

## 2021-02-08 RX ADMIN — ALBUTEROL 1 PUFF(S): 90 AEROSOL, METERED ORAL at 21:38

## 2021-02-08 RX ADMIN — SENNA PLUS 2 TABLET(S): 8.6 TABLET ORAL at 05:18

## 2021-02-08 RX ADMIN — POLYETHYLENE GLYCOL 3350 17 GRAM(S): 17 POWDER, FOR SOLUTION ORAL at 21:36

## 2021-02-08 RX ADMIN — BUDESONIDE AND FORMOTEROL FUMARATE DIHYDRATE 2 PUFF(S): 160; 4.5 AEROSOL RESPIRATORY (INHALATION) at 05:15

## 2021-02-08 RX ADMIN — Medication 1 SPRAY(S): at 05:14

## 2021-02-08 RX ADMIN — LACTULOSE 10 GRAM(S): 10 SOLUTION ORAL at 21:39

## 2021-02-08 RX ADMIN — HEPARIN SODIUM 5000 UNIT(S): 5000 INJECTION INTRAVENOUS; SUBCUTANEOUS at 05:18

## 2021-02-08 RX ADMIN — SENNA PLUS 2 TABLET(S): 8.6 TABLET ORAL at 18:10

## 2021-02-08 RX ADMIN — BUDESONIDE AND FORMOTEROL FUMARATE DIHYDRATE 2 PUFF(S): 160; 4.5 AEROSOL RESPIRATORY (INHALATION) at 18:13

## 2021-02-08 RX ADMIN — Medication 1 SPRAY(S): at 22:58

## 2021-02-08 RX ADMIN — Medication 3 MILLILITER(S): at 11:30

## 2021-02-08 RX ADMIN — ALBUTEROL 1 PUFF(S): 90 AEROSOL, METERED ORAL at 11:55

## 2021-02-08 RX ADMIN — Medication 5 MILLIGRAM(S): at 22:57

## 2021-02-08 RX ADMIN — ALBUTEROL 1 PUFF(S): 90 AEROSOL, METERED ORAL at 05:15

## 2021-02-08 RX ADMIN — NYSTATIN CREAM 1 APPLICATION(S): 100000 CREAM TOPICAL at 18:14

## 2021-02-08 RX ADMIN — Medication 1 SPRAY(S): at 18:12

## 2021-02-08 RX ADMIN — Medication 1 SPRAY(S): at 11:54

## 2021-02-08 NOTE — PROGRESS NOTE ADULT - PROBLEM SELECTOR PROBLEM 2
Chronic obstructive pulmonary disease with acute exacerbation

## 2021-02-08 NOTE — PROGRESS NOTE ADULT - PROBLEM SELECTOR PLAN 3
physical therapy eval   wound team   supportive care  obtained matias for consult  2/6/201 vascular consult completed and order written  2/7/2021 patient  is very resistant to participating this is corroborated by her sister who lives with her. pt refuses to walk , she doesn't want her legs wrapped , I have encouraged her and d/w her an d her sister  that if she chooses not to participate in PT or improve her lymphedema she will either go home or will need long term nursing home .
physical therapy eval   wound team   supportive [place]
physical therapy eval   wound team   supportive care  obtained matias for consult
physical therapy eval   wound team   supportive care  obtained matias for consult  2/6/201 vascular consult completed and order written  2/7/2021 patient  is very resistant to participating this is corroborated by her sister who lives with her. pt refuses to walk , she doesn't want her legs wrapped , I have encouraged her and d/w her an d her sister  that if she chooses not to participate in PT or improve her lymphedema she will either go home or will need long term nursing home .
physical therapy eval   wound team   supportive care  obtained matias for consult  2/6/201 vascular consult completed and order written

## 2021-02-08 NOTE — PROGRESS NOTE ADULT - ATTENDING COMMENTS
updated sister at   dispo  2/7/2021 patient  is very resistant to participating with improving her medical state.  This is corroborated by her sister who lives with her.  The pt refuses to walk ,  she doesn't want her legs wrapped.  I have encouraged her and d/w her an d her sister  that if she chooses not to participate in PT or improve her lymphedema she will either go home or will need long term nursing home .  2/7/2021 pt is motivated to go rehab after long d/w her  2/8/2021 for reha,  medically ready. await authorization .

## 2021-02-08 NOTE — PROGRESS NOTE ADULT - PROBLEM SELECTOR PLAN 1
continue home meds except lisinopril because of acute kidney injury  2/4/2021 bp stable , creatinine is improving

## 2021-02-08 NOTE — PROGRESS NOTE ADULT - SUBJECTIVE AND OBJECTIVE BOX
JAZMIN [place]    S 1[address]    Patient is a 75y old  Female who presents with a chief complaint of short of breath (07 Feb 2021 13:35)       Allergies    No Known Allergies    Intolerances        HPI:  74 yo F hx COPD on home O2, HTN, HLD, chronic lymphedema BIBA for complaints of generalized weakness and shortness of breath. Patient lives with sister who was on her way to work and did not feel comfortable leaving patient alone at home today. Patient reports increasing SOB and weakness over the last few weeks. Increased swelling and pain to lower extremities. Denies focal numbness/tingling. No chest pain. No cough.       (03 Feb 2021 10:38)      PAST MEDICAL & SURGICAL HISTORY:  COPD (chronic obstructive pulmonary disease)    HTN (hypertension)        FAMILY HISTORY:        MEDICATIONS   ALBUTerol    90 MICROgram(s) HFA Inhaler 1 Puff(s) Inhalation every 4 hours  albuterol/ipratropium for Nebulization 3 milliLiter(s) Nebulizer every 6 hours  azithromycin  IVPB 500 milliGRAM(s) IV Intermittent every 24 hours  azithromycin  IVPB      bisacodyl 5 milliGRAM(s) Oral every 12 hours PRN  budesonide 160 MICROgram(s)/formoterol 4.5 MICROgram(s) Inhaler 2 Puff(s) Inhalation two times a day  fluticasone propionate 50 MICROgram(s)/spray Nasal Spray 1 Spray(s) Both Nostrils two times a day  heparin   Injectable 5000 Unit(s) SubCutaneous every 12 hours  hydrOXYzine hydrochloride 25 milliGRAM(s) Oral once PRN  methylPREDNISolone sodium succinate Injectable 40 milliGRAM(s) IV Push daily  morphine  - Injectable 2 milliGRAM(s) IV Push every 6 hours PRN  nystatin Cream 1 Application(s) Topical two times a day  oxycodone    5 mG/acetaminophen 325 mG 1 Tablet(s) Oral every 6 hours PRN  senna 2 Tablet(s) Oral two times a day  sodium chloride 0.65% Nasal 1 Spray(s) Both Nostrils four times a day  tiotropium 18 MICROgram(s) Capsule 1 Capsule(s) Inhalation daily      Vital Signs Last 24 Hrs  T(C): 36.7 (08 Feb 2021 04:45), Max: 36.7 (08 Feb 2021 04:45)  T(F): 98.1 (08 Feb 2021 04:45), Max: 98.1 (08 Feb 2021 04:45)  HR: 84 (08 Feb 2021 06:10) (80 - 107)  BP: 127/74 (08 Feb 2021 04:45) (106/69 - 127/74)  BP(mean): --  RR: 18 (08 Feb 2021 04:45) (18 - 18)  SpO2: 96% (08 Feb 2021 06:10) (95% - 98%)      02-07-21 @ 07:01  -  02-08-21 @ 07:00  --------------------------------------------------------  IN: 250 mL / OUT: 2575 mL / NET: -2325 mL            LABS:                        10.5   10.74 )-----------( 157      ( 08 Feb 2021 06:45 )             33.8     02-08    138  |  104  |  23  ----------------------------<  165<H>  5.1   |  32<H>  |  0.85    Ca    8.4<L>      08 Feb 2021 06:45  Phos  2.4     02-08  Mg     1.6     02-08                WBC:  WBC Count: 10.74 K/uL (02-08 @ 06:45)  WBC Count: 11.44 K/uL (02-06 @ 07:41)  WBC Count: 14.94 K/uL (02-05 @ 06:29)      MICROBIOLOGY:  RECENT CULTURES:  02-03 .Urine Catheterized XXXX XXXX   No growth                    Sodium:  Sodium, Serum: 138 mmol/L (02-08 @ 06:45)  Sodium, Serum: 145 mmol/L (02-06 @ 07:41)  Sodium, Serum: 143 mmol/L (02-05 @ 06:29)      0.85 mg/dL 02-08 @ 06:45  1.18 mg/dL 02-06 @ 07:41  1.45 mg/dL 02-05 @ 06:29      Hemoglobin:  Hemoglobin: 10.5 g/dL (02-08 @ 06:45)  Hemoglobin: 10.0 g/dL (02-06 @ 07:41)  Hemoglobin: 10.6 g/dL (02-05 @ 06:29)      Platelets: Platelet Count - Automated: 157 K/uL (02-08 @ 06:45)  Platelet Count - Automated: 153 K/uL (02-06 @ 07:41)  Platelet Count - Automated: 151 K/uL (02-05 @ 06:29)              RADIOLOGY & ADDITIONAL STUDIES:

## 2021-02-08 NOTE — PROGRESS NOTE ADULT - SUBJECTIVE AND OBJECTIVE BOX
Patient is a 75y old  Female who presents with a chief complaint of short of breath (2021 09:52)      OVERNIGHT EVENTS:  none    MEDICATIONS  (STANDING):  ALBUTerol    90 MICROgram(s) HFA Inhaler 1 Puff(s) Inhalation every 4 hours  albuterol/ipratropium for Nebulization 3 milliLiter(s) Nebulizer every 6 hours  azithromycin  IVPB 500 milliGRAM(s) IV Intermittent every 24 hours  azithromycin  IVPB      budesonide 160 MICROgram(s)/formoterol 4.5 MICROgram(s) Inhaler 2 Puff(s) Inhalation two times a day  fluticasone propionate 50 MICROgram(s)/spray Nasal Spray 1 Spray(s) Both Nostrils two times a day  heparin   Injectable 5000 Unit(s) SubCutaneous every 12 hours  nystatin Cream 1 Application(s) Topical two times a day  senna 2 Tablet(s) Oral two times a day  sodium chloride 0.65% Nasal 1 Spray(s) Both Nostrils four times a day  tiotropium 18 MICROgram(s) Capsule 1 Capsule(s) Inhalation daily    MEDICATIONS  (PRN):  bisacodyl 5 milliGRAM(s) Oral every 12 hours PRN Constipation  hydrOXYzine hydrochloride 25 milliGRAM(s) Oral once PRN Itching  morphine  - Injectable 2 milliGRAM(s) IV Push every 6 hours PRN Severe Pain (7 - 10)  oxycodone    5 mG/acetaminophen 325 mG 1 Tablet(s) Oral every 6 hours PRN Moderate Pain (4 - 6)        Allergies    No Known Allergies    Intolerances        SUBJECTIVE: in bed in NAD, no acute events overnight       Vital Signs Last 24 Hrs  T(C): 36.7 (2021 11:41), Max: 36.7 (2021 04:45)  T(F): 98.1 (2021 11:41), Max: 98.1 (2021 04:45)  HR: 105 (2021 11:41) (84 - 118)  BP: 110/74 (2021 11:41) (106/69 - 136/80)  BP(mean): --  RR: 18 (2021 11:41) (18 - 18)  SpO2: 99% (2021 11:41) (96% - 99%)    PHYSICAL EXAM:  GENERAL: NAD, well-groomed, well-developed, morbidly obese  HEAD:  Atraumatic, Normocephalic  EYES: EOMI, PERRLA, conjunctiva and sclera clear  ENMT: No tonsillar erythema, exudates, or enlargement; Moist mucous membranes, Good dentition, No lesions  NECK: Supple, No JVD, Normal thyroid  CHEST/LUNG: no wheezing   HEART: Regular rate and rhythm; No murmurs, rubs, or gallops  ABDOMEN: Soft, Nontender, Nondistended; Bowel sounds present  EXTREMITIES:  2+ Peripheral Pulses, No clubbing, cyanosis, or edema BL LE  SKIN: No rashes or lesions  NERVOUS SYSTEM:  Alert & Oriented X3, Good concentration; Motor Strength 5/5 B/L upper and 3-/5  lower extremities; lymphedema with weeping lesion on the sandra of legs   DTRs 2+ intact and symmetric, sensation intact BL    LABS:                                   10.5   10.74 )-----------( 157      ( 2021 06:45 )             33.8   02-08    138  |  104  |  23  ----------------------------<  165<H>  5.1   |  32<H>  |  0.85    Ca    8.4<L>      2021 06:45  Phos  2.4     02-08  Mg     1.6     02-08            Urinalysis Basic - ( 2021 08:43 )    Color: Basilia / Appearance: Clear / S.020 / pH: x  Gluc: x / Ketone: Trace  / Bili: Moderate / Urobili: 4 mg/dL   Blood: x / Protein: 15 mg/dL / Nitrite: Negative   Leuk Esterase: Trace / RBC: 0-2 /HPF / WBC 3-5   Sq Epi: x / Non Sq Epi: Few / Bacteria: Moderate      Cultures;   CAPILLARY BLOOD GLUCOSE        Lipid panel:     CARDIAC MARKERS ( 2021 07:49 )  <.015 ng/mL / x     / x     / x     / x            RADIOLOGY & ADDITIONAL TESTS:      Imaging Personally Reviewed:  [ x] YES      Consultant(s) Notes Reviewed:  [x ] YES     Care Discussed with [x ] Consultants [X ] Patient [x ] Family  [x ]    [x ]  Other; RN

## 2021-02-08 NOTE — STUDENT SIGN OFF DOCUMENT - DOCUMENTS STUDENTS ARE SIGNED OFF ON
Vital Signs/Plan of Care/Assessment and Intervention
Plan of Care
Vital Signs/Plan of Care/Assessment and Intervention

## 2021-02-08 NOTE — PROGRESS NOTE ADULT - PROBLEM SELECTOR PLAN 4
intravenous hydration   renal consult - noted  2/4/2021 creatinine is improving
intravenous hydration   renal consult - noted  2/4/2021 creatinine is improving  2/7/2021 stop IVF   2/5/2021 continued improvement
intravenous hydration   renal consult - noted  2/4/2021 creatinine is improving  2/7/2021 stop IVF   2/5/2021 continued improvement  2/8/2021 resolved
intravenous hydration   renal consult - noted  2/4/2021 creatinine is improving  2/5/2021 continued improvement
intravenous hydration   renal consult - noted  2/4/2021 creatinine is improving  2/5/2021 continued improvement

## 2021-02-08 NOTE — PROGRESS NOTE ADULT - PROBLEM SELECTOR PLAN 2
steroids  duoneb   cta chest negative for infiltrate and PE  2/3/2021 pulm consult noted  2/7/2021  change to oral steroids  in am  2/8/2021 oral steroids to begin

## 2021-02-08 NOTE — PROGRESS NOTE ADULT - ASSESSMENT
[facility] [phone] 1945 2/3/2021 DR MEGAN ARREOLA      REVIEW OF SYMPTOMS      Able to give ROS  Yes     RELIABLE No   CONSTITUTIONAL Weakness Yes  Chills No Vision changes No  ENDOCRINE No unexplained hair loss No heat or cold intolerance    ALLERGY No hives  Sore throat No   RESP Coughing blood no  Shortness of breath YES   NEURO No Headache  Confusion Pain neck No   CARDIAC No Chest pain No Palpitations   GI No Pain abdomen NO   Vomiting NO     PHYSICAL EXAM    HEENT Unremarkable  atraumatic   RESP Fair air entry EXP prolonged    Harsh breath sound Resp distres mild   CARDIAC S1 S2 No S3     NO JVD    ABDOMEN SOFT BS PRESENT NOT DISTENDED No hepatosplenomegaly PEDAL EDEMA present No calf tenderness  NO rash     PATIENT SUMMARY  74 yo F hx COPD on home O2, HTN, HLD, chronic lymphedema BIBA for complaints of generalized weakness and shortness of breath. Patient lives with sister who was on her way to work and did not feel comfortable leaving patient alone at home today. Patient reports increasing SOB and weakness over the last few weeks. Increased swelling and pain to lower extremities. Denies focal numbness/tingling. No chest pain. No cough  Pulm consulted 2/3/2021    PATIENT DATA/BEST PRACTICE ISSUES   ALLERGY. nka  WT.              2/3/2021 140  BMI.              2/3/2021 56  ADVANCED DIRECTIVE.     HEAD OF BED ELEVATION. Yes      DVT PROPHYLAXIS.          [facility] (2/3/2021)                                                                PRYOR PROPHYLAXIS.    PATIENT DATA  OXYGENATION. 2/3-2/4-2/6/2021 4l 100% - 4l 98% -  nc 97%  HEMODYNAMICS.    VITALS/IO/VENT/DRIPS.  2/6/2021 afeb 96 115/70     DISPOSITION PROBLEM ASSESSMENT RECOMMENDATION   DVT P [facility] (2/3/2021)    DIET.        Dash (2/3/2021)                                 RESP FAILURE 2/3/2021 2l 739/32/77  Oxygenation failure sec obesity copd Target PO 90-95% Requiring lfnc Has home O2   DYSPNEA multifactorial sec obesity copd  HEMODYNAICS Stable   RESP INFECTION 2/4/2021 pr .46 2/3 cxr clear lungs rvp ordered 2/3 pending collection Azithro added 2/4/2021   COVID 2/3 pcr negv 2/3 igg negv AR COVID naive   COPD ex 2/4/2021 solumed decr to 40 (24) symnicort (2/4) added cont spiriva   RO PE 2/3 d-dimr elevated at 1020 BUT 2/3 cta negv and 2/3 v duplx negv VTE ruled out Cont dvt pplx   RO CHFawait echo ordered 2/3   LYMPHEDEMA POSSIBLE CELLULITIS   ANEMIA   2/5/2021 Hb 8.5   BARBARA cr 2/3-2/4-2/6/2021 cr 2.2 - 1.7-1.1   us renal 2/4/2021 us renal sammi hydro  1/2 ns 50 (2/6)  iv fluids  Improving on iv fluids   LEG WOUNDS On ns xeroform and ace (2/5/2021) 2/6/2021 seen Dr Ruby le lymphedema and post calf ulcers venous stasis v decub lcers appear clean no abio needed rx with xeroform cling and ace wrap qod     PLAN MANAGE AS COPD EX AND PRERENAL BARBARA SEC DEHYDRATION      TIME SPENT   Over 25 minutes aggregate care time spent on encounter; activities included   direct patient care, counseling and/or coordinating care reviewing notes, lab data/ imaging , discussion with multidisciplinary team/ patient  /family and explaining in detail risks, benefits, alternatives  of the recommendations     [facility] [phone] 1945 2/3/2021 DR MEGAN ARREOLA     
    [facility] [phone] 1945 2/3/2021 DR MEGAN ARREOLA      REVIEW OF SYMPTOMS      Able to give ROS  Yes     RELIABLE No   CONSTITUTIONAL Weakness Yes  Chills No Vision changes No  ENDOCRINE No unexplained hair loss No heat or cold intolerance    ALLERGY No hives  Sore throat No   RESP Coughing blood no  Shortness of breath YES   NEURO No Headache  Confusion Pain neck No   CARDIAC No Chest pain No Palpitations   GI No Pain abdomen NO   Vomiting NO     PHYSICAL EXAM    HEENT Unremarkable  atraumatic   RESP Fair air entry EXP prolonged    Harsh breath sound Resp distres mild   CARDIAC S1 S2 No S3     NO JVD    ABDOMEN SOFT BS PRESENT NOT DISTENDED No hepatosplenomegaly PEDAL EDEMA present No calf tenderness  NO rash     PATIENT SUMMARY  74 yo F hx COPD on home O2, HTN, HLD, chronic lymphedema BIBA for complaints of generalized weakness and shortness of breath. Patient lives with sister who was on her way to work and did not feel comfortable leaving patient alone at home today. Patient reports increasing SOB and weakness over the last few weeks. Increased swelling and pain to lower extremities. Denies focal numbness/tingling. No chest pain. No cough  Pulm consulted 2/3/2021        HOSPITAL COURSE.  2/4 solumed 40.4 (2/3/2021)--> solumed 40 (2/4)    2/5/2021 Na 152   2/5/2021 iv changed to d5 50   2/6/2021 1/2 ns 50 (2/6) dced 2/7      DISPOSITION PROBLEM ASSESSMENT RECOMMENDATION   DVT P [facility] (2/3/2021)    DIET.        Dash (2/3/2021)                                 RESP FAILURE 2/3/2021 2l 739/32/77  Oxygenation failure sec obesity copd Target PO 90-95% Requiring lfnc Has home O2   DYSPNEA multifactorial sec obesity copd  HEMODYNAICS Stable   RESP INFECTION 2/4/2021 pr .46 2/3 cxr clear lungs rvp ordered 2/3 pending collection Azithro added 2/4/2021   COVID 2/3 pcr negv 2/3 igg negv AR COVID naive   COPD ex 2/4/2021 solumed decr to 40 (24) symnicort (2/4) added cont spiriva   RO PE 2/3 d-dimr elevated at 1020 BUT 2/3 cta negv and 2/3 v duplx negv VTE ruled out Cont dvt pplx   RO CHFawait echo ordered 2/3   LYMPHEDEMA POSSIBLE CELLULITIS   ANEMIA   2/5/2021 Hb 8.5   BARBARA cr 2/3-2/4-2/6/2021 cr 2.2 - 1.7-1.1   us renal 2/4/2021 us renal sammi hydro  Improvd   LEG WOUNDS On ns xeroform and ace (2/5/2021) 2/6/2021 seen Dr Ruby le lymphedema and post calf ulcers venous stasis v decub lcers appear clean no abio needed rx with xeroform cling and ace wrap qod     PLAN MANAGE AS COPD EX    PRERENAL BARBARA SEC DEHYDRATION IMPROVD   DC PLANNIN     TIME SPENT   Over 25 minutes aggregate care time spent on encounter; activities included   direct patient care, counseling and/or coordinating care reviewing notes, lab data/ imaging , discussion with multidisciplinary team/ patient  /family and explaining in detail risks, benefits, alternatives  of the recommendations     [facility] [phone] 1945 2/3/2021 DR MEGAN ARREOLA     
    [facility] [phone] 1945 2/3/2021 DR MEGAN ARREOLA      REVIEW OF SYMPTOMS      Able to give ROS  Yes     RELIABLE No   CONSTITUTIONAL Weakness Yes  Chills No Vision changes No  ENDOCRINE No unexplained hair loss No heat or cold intolerance    ALLERGY No hives  Sore throat No   RESP Coughing blood no  Shortness of breath YES   NEURO No Headache  Confusion Pain neck No   CARDIAC No Chest pain No Palpitations   GI No Pain abdomen NO   Vomiting NO     PHYSICAL EXAM    HEENT Unremarkable  atraumatic   RESP Fair air entry EXP prolonged    Harsh breath sound Resp distres mild   CARDIAC S1 S2 No S3     NO JVD    ABDOMEN SOFT BS PRESENT NOT DISTENDED No hepatosplenomegaly PEDAL EDEMA present No calf tenderness  NO rash     PATIENT SUMMARY  74 yo F hx COPD on home O2, HTN, HLD, chronic lymphedema BIBA for complaints of generalized weakness and shortness of breath. Patient lives with sister who was on her way to work and did not feel comfortable leaving patient alone at home today. Patient reports increasing SOB and weakness over the last few weeks. Increased swelling and pain to lower extremities. Denies focal numbness/tingling. No chest pain. No cough  Pulm consulted 2/3/2021      PATIENT DATA  OXYGENATION. 2/3-2/4-2/6/2021 4l 100% - 4l 98% -  nc 97%  HEMODYNAMICS.    VITALS/IO/VENT/DRIPS.  2/8/2021 afeb 100 110/70     DISPOSITION PROBLEM ASSESSMENT RECOMMENDATION   DVT P [facility]ps[facility] (2/3/2021)    DIET.        Dash (2/3/2021)                                 RESP FAILURE 2/3/2021 2l 739/32/77  Oxygenation failure sec obesity copd Target PO 90-95% Requiring lfnc Has home O2   DYSPNEA multifactorial sec obesity copd  HEMODYNAICS Stable   RESP INFECTION 2/4/2021 pr .46 2/3 cxr clear lungs rvp ordered 2/3 pending collection Azithro added 2/4/2021   COVID 2/3 pcr negv 2/3 igg negv AR COVID naive   COPD ex 2/4/2021 solumed decr to 40 (24) symnicort (2/4) added cont spiriva   RO PE 2/3 d-dimr elevated at 1020 BUT 2/3 cta negv and 2/3 v duplx negv VTE ruled out Cont dvt pplx   RO CHFawait echo ordered 2/3   LYMPHEDEMA POSSIBLE CELLULITIS   ANEMIA   2/5/2021 Hb 8.5   BARBARA cr 2/3-2/4-2/6/2021 cr 2.2 - 1.7-1.1   us renal 2/4/2021 us renal sammi hydro  Improvd   LEG WOUNDS On ns xeroform and ace (2/5/2021) 2/6/2021 seen Dr Ruby le lymphedema and post calf ulcers venous stasis v decub lcers appear clean no abio needed rx with xeroform cling and ace wrap qod     PLAN MANAGE AS COPD EX    PRERENAL BARBARA SEC DEHYDRATION IMPROVD   DC PLANNIN       TIME SPENT   Over 25 minutes aggregate care time spent on encounter; activities included   direct patient care, counseling and/or coordinating care reviewing notes, lab data/ imaging , discussion with multidisciplinary team/ patient  /family and explaining in detail risks, benefits, alternatives  of the recommendations     [facility] [phone] 1945 2/3/2021 DR MEGAN ARREOLA     
    [facility] [phone] 1945 2/3/2021 DR MEGAN ARREOLA      REVIEW OF SYMPTOMS      Able to give ROS  Yes     RELIABLE No   CONSTITUTIONAL Weakness Yes  Chills No Vision changes No  ENDOCRINE No unexplained hair loss No heat or cold intolerance    ALLERGY No hives  Sore throat No   RESP Coughing blood no  Shortness of breath YES   NEURO No Headache  Confusion Pain neck No   CARDIAC No Chest pain No Palpitations   GI No Pain abdomen NO   Vomiting NO     PHYSICAL EXAM    HEENT Unremarkable PERRLA atraumatic   RESP Fair air entry EXP prolonged    Harsh breath sound Resp distres mild   CARDIAC S1 S2 No S3     NO JVD    ABDOMEN SOFT BS PRESENT NOT DISTENDED No hepatosplenomegaly PEDAL EDEMA present No calf tenderness  NO rash     PATIENT SUMMARY  74 yo F hx COPD on home O2, HTN, HLD, chronic lymphedema BIBA for complaints of generalized weakness and shortness of breath. Patient lives with sister who was on her way to work and did not feel comfortable leaving patient alone at home today. Patient reports increasing SOB and weakness over the last few weeks. Increased swelling and pain to lower extremities. Denies focal numbness/tingling. No chest pain. No cough  Pulm consulted 2/3/2021        PATIENT DATA  OXYGENATION. 2/3-2/4/2021 4l 100% - 4l 98%  HEMODYNAMICS.    VITALS/IO/VENT/DRIPS.  2/5/2021 afeb 100 118/60    DISPOSITION PROBLEM ASSESSMENT RECOMMENDATION   DVT P [facility]ps[facility] (2/3/2021)    DIET.        Dash (2/3/2021)                                 RESP FAILURE 2/3/2021 2l 739/32/77  Oxygenation failure sec obesity copd Target PO 90-95% Requiring lfnc Has home O2   DYSPNEA multifactorial sec obesity copd  HEMODYNAICS Stable   RESP INFECTION 2/4/2021 pr .46 2/3 cxr clear lungs rvp ordered 2/3 pending collection Azithro added 2/4/2021   COVID 2/3 pcr negv 2/3 igg negv AR COVID naive   COPD ex 2/4/2021 solumed decr to 40 (24) symnicort (2/4) added cont spiriva   RO PE 2/3 d-dimr elevated at 1020 BUT 2/3 cta negv and 2/3 v duplx negv VTE ruled out Cont dvt pplx   RO CHFawait echo ordered 2/3   LYMPHEDEMA POSSIBLE CELLULITIS Consider vasc eval   ANEMIA   2/5/2021 Hb 8.5   HYPERNATREMIA   Na 2/5/2021 Na 152   NS 80 (2/3) DCED 2/5/2021  D5 50 (2/5/2021)  BARBARA cr 2/3-2/4/2021 cr 2.2 - 1.7  2/4/2021 us renal sammi hydro NS 80 (2/3) --> d5 50 (2/5/2021)   Improving on iv fluids   LEG WOUNDS On ns xeroform and ace (2/5/2021)     PLAN MANAGE AS COPD EX AND PRERENAL BARBARA SEC DEHYDRATION  IV CHANGED TO D5 IN VIEW OF HYPERNA   patient should see me for pulm followup as outpt after dc Call  for appointment     TIME SPENT   Over 25 minutes aggregate care time spent on encounter; activities included   direct patient care, counseling and/or coordinating care reviewing notes, lab data/ imaging , discussion with multidisciplinary team/ patient  /family and explaining in detail risks, benefits, alternatives  of the recommendations     [facility] [phone] 1945 2/3/2021 DR MEGAN ARREOLA     
    [facility] [phone] 1945 2/3/2021 DR MEGAN ARREOLA      REVIEW OF SYMPTOMS      Able to give ROS  Yes     RELIABLE No   CONSTITUTIONAL Weakness Yes  Chills No Vision changes No  ENDOCRINE No unexplained hair loss No heat or cold intolerance    ALLERGY No hives  Sore throat No   RESP Coughing blood no  Shortness of breath YES   NEURO No Headache  Confusion Pain neck No   CARDIAC No Chest pain No Palpitations   GI No Pain abdomen NO   Vomiting NO     PHYSICAL EXAM    HEENT Unremarkable PERRLA atraumatic   RESP Fair air entry EXP prolonged    Harsh breath sound Resp distres mild   CARDIAC S1 S2 No S3     NO JVD    ABDOMEN SOFT BS PRESENT NOT DISTENDED No hepatosplenomegaly PEDAL EDEMA present No calf tenderness  NO rash     PATIENT SUMMARY  74 yo F hx COPD on home O2, HTN, HLD, chronic lymphedema BIBA for complaints of generalized weakness and shortness of breath. Patient lives with sister who was on her way to work and did not feel comfortable leaving patient alone at home today. Patient reports increasing SOB and weakness over the last few weeks. Increased swelling and pain to lower extremities. Denies focal numbness/tingling. No chest pain. No cough  Pulm consulted 2/3/2021      PATIENT DATA/BEST PRACTICE ISSUES   ALLERGY. nka  WT.              2/3/2021 140  BMI.              2/3/2021 56  ADVANCED DIRECTIVE.     HEAD OF BED ELEVATION. Yes      DVT PROPHYLAXIS.          [facility] (2/3/2021)                                                                PRYOR PROPHYLAXIS.        DIET.        Dash (2/3/2021)                                                                   IV.    ns 80 (2/3/2021)   DYSPHAGIA EVAL.      PATIENT DATA  OXYGENATION. 2/3-2/4/2021 4l 100% - 4l 98%  HEMODYNAMICS.    VITALS/IO/VENT/DRIPS.  2/4/2021 afeb 100 110/67    DISPOSITION PROBLEM ASSESSMENT RECOMMENDATION   RESP FAILURE 2/3/2021 2l 739/32/77  Oxygenation failure sec obesity copd Target PO 90-95% Requiring lfnc Has home O2   DYSPNEA multifactorial sec obesity copd  HEMODYNAICS Stable   RESP INFECTION 2/4/2021 pr .46 2/3 cxr clear lungs rvp ordered 2/3 pending collection Azithro added 2/4/2021   COVID 2/3 pcr negv 2/3 igg negv AR COVID naive   COPD ex 2/4/2021 solumed decr to 40 (24) symnicort (2/4) added cont spiriva   RO PE 2/3 d-dimr elevated at 1020 BUT 2/3 cta negv and 2/3 v duplx negv VTE ruled out Cont dvt pplx   RO CHF await echo ordered 2/3   BARBARA cr 2/3-2/4/2021 cr 2.2 - 1.7  2/4/2021 us renal sammi hydro NS 80 (2/3)   Improving on iv fluids     PLAN MANAGE AS COPD EX AND PRERENAL BARBARA SEC DEHYDRATION  patient should see me for pulm followup as outpt after dc Call  for appointment     TIME SPENT   Over 25 minutes aggregate care time spent on encounter; activities included   direct patient care, counseling and/or coordinating care reviewing notes, lab data/ imaging , discussion with multidisciplinary team/ patient  /family and explaining in detail risks, benefits, alternatives  of the recommendations     [facility] [phone] 1945 2/3/2021 DR MEGAN ARREOLA     
75 years old female with emphysema with short of breath with lymphedema ulcer bilateral         IMPROVE VTE Individual Risk Assessment          RISK                                                          Points  [  ] Previous VTE                                                3  [  ] Thrombophilia                                             2  [  ] Lower limb paralysis                                   2        (unable to hold up >15 seconds)    [  ] Current Cancer                                             2         (within 6 months)  [  ] Immobilization > 24 hrs                              1  [  ] ICU/CCU stay > 24 hours                             1  [  ] Age > 60                                                         1    IMPROVE VTE Score: 2    

## 2021-02-09 ENCOUNTER — TRANSCRIPTION ENCOUNTER (OUTPATIENT)
Age: 76
End: 2021-02-09

## 2021-02-09 VITALS
HEART RATE: 119 BPM | TEMPERATURE: 97 F | SYSTOLIC BLOOD PRESSURE: 152 MMHG | RESPIRATION RATE: 18 BRPM | DIASTOLIC BLOOD PRESSURE: 95 MMHG | OXYGEN SATURATION: 92 %

## 2021-02-09 LAB
ANION GAP SERPL CALC-SCNC: 4 MMOL/L — LOW (ref 5–17)
BUN SERPL-MCNC: 19 MG/DL — SIGNIFICANT CHANGE UP (ref 7–23)
CALCIUM SERPL-MCNC: 8.4 MG/DL — LOW (ref 8.5–10.1)
CHLORIDE SERPL-SCNC: 105 MMOL/L — SIGNIFICANT CHANGE UP (ref 96–108)
CO2 SERPL-SCNC: 33 MMOL/L — HIGH (ref 22–31)
CREAT SERPL-MCNC: 0.95 MG/DL — SIGNIFICANT CHANGE UP (ref 0.5–1.3)
GLUCOSE SERPL-MCNC: 129 MG/DL — HIGH (ref 70–99)
HCT VFR BLD CALC: 36 % — SIGNIFICANT CHANGE UP (ref 34.5–45)
HGB BLD-MCNC: 11.3 G/DL — LOW (ref 11.5–15.5)
MAGNESIUM SERPL-MCNC: 1.6 MG/DL — SIGNIFICANT CHANGE UP (ref 1.6–2.6)
MCHC RBC-ENTMCNC: 27.8 PG — SIGNIFICANT CHANGE UP (ref 27–34)
MCHC RBC-ENTMCNC: 31.4 GM/DL — LOW (ref 32–36)
MCV RBC AUTO: 88.7 FL — SIGNIFICANT CHANGE UP (ref 80–100)
NRBC # BLD: 0 /100 WBCS — SIGNIFICANT CHANGE UP (ref 0–0)
PHOSPHATE SERPL-MCNC: 2.2 MG/DL — LOW (ref 2.5–4.5)
PLATELET # BLD AUTO: 155 K/UL — SIGNIFICANT CHANGE UP (ref 150–400)
POTASSIUM SERPL-MCNC: 4.1 MMOL/L — SIGNIFICANT CHANGE UP (ref 3.5–5.3)
POTASSIUM SERPL-SCNC: 4.1 MMOL/L — SIGNIFICANT CHANGE UP (ref 3.5–5.3)
RBC # BLD: 4.06 M/UL — SIGNIFICANT CHANGE UP (ref 3.8–5.2)
RBC # FLD: 14.4 % — SIGNIFICANT CHANGE UP (ref 10.3–14.5)
SODIUM SERPL-SCNC: 142 MMOL/L — SIGNIFICANT CHANGE UP (ref 135–145)
WBC # BLD: 13.23 K/UL — HIGH (ref 3.8–10.5)
WBC # FLD AUTO: 13.23 K/UL — HIGH (ref 3.8–10.5)

## 2021-02-09 PROCEDURE — 99239 HOSP IP/OBS DSCHRG MGMT >30: CPT

## 2021-02-09 RX ORDER — NYSTATIN CREAM 100000 [USP'U]/G
1 CREAM TOPICAL
Qty: 0 | Refills: 0 | DISCHARGE
Start: 2021-02-09

## 2021-02-09 RX ORDER — FLUTICASONE PROPIONATE 50 MCG
1 SPRAY, SUSPENSION NASAL
Qty: 0 | Refills: 0 | DISCHARGE
Start: 2021-02-09

## 2021-02-09 RX ORDER — MAGNESIUM OXIDE 400 MG ORAL TABLET 241.3 MG
1 TABLET ORAL
Qty: 0 | Refills: 0 | DISCHARGE
Start: 2021-02-09 | End: 2021-02-11

## 2021-02-09 RX ORDER — TIOTROPIUM BROMIDE 18 UG/1
1 CAPSULE ORAL; RESPIRATORY (INHALATION)
Qty: 0 | Refills: 0 | DISCHARGE
Start: 2021-02-09

## 2021-02-09 RX ORDER — METOPROLOL TARTRATE 50 MG
1 TABLET ORAL
Qty: 0 | Refills: 0 | DISCHARGE

## 2021-02-09 RX ORDER — POLYETHYLENE GLYCOL 3350 17 G/17G
17 POWDER, FOR SOLUTION ORAL
Qty: 0 | Refills: 0 | DISCHARGE
Start: 2021-02-09

## 2021-02-09 RX ORDER — SODIUM CHLORIDE 0.65 %
1 AEROSOL, SPRAY (ML) NASAL
Qty: 0 | Refills: 0 | DISCHARGE
Start: 2021-02-09

## 2021-02-09 RX ORDER — BUDESONIDE AND FORMOTEROL FUMARATE DIHYDRATE 160; 4.5 UG/1; UG/1
2 AEROSOL RESPIRATORY (INHALATION)
Qty: 0 | Refills: 0 | DISCHARGE
Start: 2021-02-09

## 2021-02-09 RX ORDER — SENNA PLUS 8.6 MG/1
2 TABLET ORAL
Qty: 0 | Refills: 0 | DISCHARGE
Start: 2021-02-09

## 2021-02-09 RX ORDER — IPRATROPIUM/ALBUTEROL SULFATE 18-103MCG
3 AEROSOL WITH ADAPTER (GRAM) INHALATION
Qty: 0 | Refills: 0 | DISCHARGE
Start: 2021-02-09

## 2021-02-09 RX ORDER — FUROSEMIDE 40 MG
1 TABLET ORAL
Qty: 0 | Refills: 0 | DISCHARGE

## 2021-02-09 RX ORDER — SODIUM,POTASSIUM PHOSPHATES 278-250MG
1 POWDER IN PACKET (EA) ORAL
Qty: 2 | Refills: 0
Start: 2021-02-09 | End: 2021-02-09

## 2021-02-09 RX ORDER — ALBUTEROL 90 UG/1
1 AEROSOL, METERED ORAL
Qty: 0 | Refills: 0 | DISCHARGE
Start: 2021-02-09

## 2021-02-09 RX ORDER — HEPARIN SODIUM 5000 [USP'U]/ML
5000 INJECTION INTRAVENOUS; SUBCUTANEOUS
Qty: 0 | Refills: 0 | DISCHARGE
Start: 2021-02-09

## 2021-02-09 RX ORDER — APIXABAN 2.5 MG/1
1 TABLET, FILM COATED ORAL
Qty: 0 | Refills: 0 | DISCHARGE

## 2021-02-09 RX ORDER — SODIUM,POTASSIUM PHOSPHATES 278-250MG
1 POWDER IN PACKET (EA) ORAL
Refills: 0 | Status: DISCONTINUED | OUTPATIENT
Start: 2021-02-09 | End: 2021-02-09

## 2021-02-09 RX ADMIN — Medication 1 SPRAY(S): at 06:15

## 2021-02-09 RX ADMIN — OXYCODONE AND ACETAMINOPHEN 1 TABLET(S): 5; 325 TABLET ORAL at 06:41

## 2021-02-09 RX ADMIN — Medication 1 PACKET(S): at 11:31

## 2021-02-09 RX ADMIN — MAGNESIUM OXIDE 400 MG ORAL TABLET 400 MILLIGRAM(S): 241.3 TABLET ORAL at 08:27

## 2021-02-09 RX ADMIN — MAGNESIUM OXIDE 400 MG ORAL TABLET 400 MILLIGRAM(S): 241.3 TABLET ORAL at 11:35

## 2021-02-09 RX ADMIN — SENNA PLUS 2 TABLET(S): 8.6 TABLET ORAL at 06:15

## 2021-02-09 RX ADMIN — HEPARIN SODIUM 5000 UNIT(S): 5000 INJECTION INTRAVENOUS; SUBCUTANEOUS at 06:15

## 2021-02-09 RX ADMIN — Medication 1 SPRAY(S): at 06:27

## 2021-02-09 RX ADMIN — ALBUTEROL 1 PUFF(S): 90 AEROSOL, METERED ORAL at 11:35

## 2021-02-09 RX ADMIN — OXYCODONE AND ACETAMINOPHEN 1 TABLET(S): 5; 325 TABLET ORAL at 14:35

## 2021-02-09 RX ADMIN — Medication 40 MILLIGRAM(S): at 06:23

## 2021-02-09 RX ADMIN — ALBUTEROL 1 PUFF(S): 90 AEROSOL, METERED ORAL at 14:07

## 2021-02-09 RX ADMIN — Medication 3 MILLILITER(S): at 05:21

## 2021-02-09 RX ADMIN — ALBUTEROL 1 PUFF(S): 90 AEROSOL, METERED ORAL at 06:15

## 2021-02-09 RX ADMIN — NYSTATIN CREAM 1 APPLICATION(S): 100000 CREAM TOPICAL at 06:15

## 2021-02-09 RX ADMIN — BUDESONIDE AND FORMOTEROL FUMARATE DIHYDRATE 2 PUFF(S): 160; 4.5 AEROSOL RESPIRATORY (INHALATION) at 06:18

## 2021-02-09 RX ADMIN — Medication 1 SPRAY(S): at 11:36

## 2021-02-09 RX ADMIN — Medication 3 MILLILITER(S): at 11:39

## 2021-02-09 NOTE — PROGRESS NOTE ADULT - REASON FOR ADMISSION
short of breath

## 2021-02-09 NOTE — DISCHARGE NOTE PROVIDER - PROVIDER TOKENS
FREE:[LAST:[pcp],PHONE:[(   )    -],FAX:[(   )    -]],PROVIDER:[TOKEN:[[id number]]],PROVIDER:[TOKEN:[[id number]]]

## 2021-02-09 NOTE — PROGRESS NOTE ADULT - SUBJECTIVE AND OBJECTIVE BOX
JAZMIN IRELAND    S [address]    Patient is a 75y old  Female who presents with a chief complaint of short of breath (08 Feb 2021 13:40)       Allergies    No Known Allergies    Intolerances        HPI:  74 yo F hx COPD on home O2, HTN, HLD, chronic lymphedema BIBA for complaints of generalized weakness and shortness of breath. Patient lives with sister who was on her way to work and did not feel comfortable leaving patient alone at home today. Patient reports increasing SOB and weakness over the last few weeks. Increased swelling and pain to lower extremities. Denies focal numbness/tingling. No chest pain. No cough.       (03 Feb 2021 10:38)      PAST MEDICAL & SURGICAL HISTORY:  COPD (chronic obstructive pulmonary disease)    HTN (hypertension)        FAMILY HISTORY:        MEDICATIONS   ALBUTerol    90 MICROgram(s) HFA Inhaler 1 Puff(s) Inhalation every 4 hours  albuterol/ipratropium for Nebulization 3 milliLiter(s) Nebulizer every 6 hours  azithromycin  IVPB      azithromycin  IVPB 500 milliGRAM(s) IV Intermittent every 24 hours  bisacodyl 5 milliGRAM(s) Oral every 12 hours PRN  budesonide 160 MICROgram(s)/formoterol 4.5 MICROgram(s) Inhaler 2 Puff(s) Inhalation two times a day  fluticasone propionate 50 MICROgram(s)/spray Nasal Spray 1 Spray(s) Both Nostrils two times a day  heparin   Injectable 5000 Unit(s) SubCutaneous every 12 hours  hydrOXYzine hydrochloride 25 milliGRAM(s) Oral once PRN  magnesium oxide 400 milliGRAM(s) Oral three times a day with meals  morphine  - Injectable 2 milliGRAM(s) IV Push every 6 hours PRN  nystatin Cream 1 Application(s) Topical two times a day  oxycodone    5 mG/acetaminophen 325 mG 1 Tablet(s) Oral every 6 hours PRN  polyethylene glycol 3350 17 Gram(s) Oral daily  predniSONE   Tablet 40 milliGRAM(s) Oral daily  senna 2 Tablet(s) Oral two times a day  sodium chloride 0.65% Nasal 1 Spray(s) Both Nostrils four times a day  tiotropium 18 MICROgram(s) Capsule 1 Capsule(s) Inhalation daily      Vital Signs Last 24 Hrs  T(C): 37.3 (09 Feb 2021 04:56), Max: 37.3 (09 Feb 2021 04:56)  T(F): 99.1 (09 Feb 2021 04:56), Max: 99.1 (09 Feb 2021 04:56)  HR: 109 (09 Feb 2021 05:21) (100 - 118)  BP: 111/72 (09 Feb 2021 04:56) (110/74 - 141/82)  BP(mean): --  RR: 17 (09 Feb 2021 04:56) (17 - 18)  SpO2: 93% (09 Feb 2021 05:21) (91% - 99%)      02-08-21 @ 07:01  -  02-09-21 @ 07:00  --------------------------------------------------------  IN: 0 mL / OUT: 1700 mL / NET: -1700 mL            LABS:                        11.3   13.23 )-----------( 155      ( 09 Feb 2021 08:20 )             36.0     02-09    142  |  105  |  19  ----------------------------<  129<H>  4.1   |  33<H>  |  0.95    Ca    8.4<L>      09 Feb 2021 08:20  Phos  2.2     02-09  Mg     1.6     02-09                WBC:  WBC Count: 13.23 K/uL (02-09 @ 08:20)  WBC Count: 10.74 K/uL (02-08 @ 06:45)  WBC Count: 11.44 K/uL (02-06 @ 07:41)      MICROBIOLOGY:  RECENT CULTURES:  02-03 .Urine Catheterized XXXX XXXX   No growth                    Sodium:  Sodium, Serum: 142 mmol/L (02-09 @ 08:20)  Sodium, Serum: 138 mmol/L (02-08 @ 06:45)  Sodium, Serum: 145 mmol/L (02-06 @ 07:41)      0.95 mg/dL 02-09 @ 08:20  0.85 mg/dL 02-08 @ 06:45  1.18 mg/dL 02-06 @ 07:41      Hemoglobin:  Hemoglobin: 11.3 g/dL (02-09 @ 08:20)  Hemoglobin: 10.5 g/dL (02-08 @ 06:45)  Hemoglobin: 10.0 g/dL (02-06 @ 07:41)      Platelets: Platelet Count - Automated: 155 K/uL (02-09 @ 08:20)  Platelet Count - Automated: 157 K/uL (02-08 @ 06:45)  Platelet Count - Automated: 153 K/uL (02-06 @ 07:41)              RADIOLOGY & ADDITIONAL STUDIES:

## 2021-02-09 NOTE — DISCHARGE NOTE PROVIDER - NSDCCPCAREPLAN_GEN_ALL_CORE_FT
PRINCIPAL DISCHARGE DIAGNOSIS  Diagnosis: Chronic obstructive pulmonary disease with acute exacerbation  Assessment and Plan of Treatment: Chronic obstructive pulmonary disease with acute exacerbation      SECONDARY DISCHARGE DIAGNOSES  Diagnosis: Essential hypertension  Assessment and Plan of Treatment:     Diagnosis: Lymphedema  Assessment and Plan of Treatment: Lymphedema    Diagnosis: Acute renal failure, unspecified acute renal failure type  Assessment and Plan of Treatment: Acute renal failure, unspecified acute renal failure type

## 2021-02-09 NOTE — DISCHARGE NOTE PROVIDER - CARE PROVIDER_API CALL
pcp,   Phone: (   )    -  Fax: (   )    -  Follow Up Time:     Bijan Foster)  Critical Care Medicine; Internal Medicine; Pulmonary Disease  [facility] [place] [address]  Phone: [phone]  Fax: [phone]  Follow Up Time:     Leonel Ruby)  Surgery  [address], [address]  [place] [address]  Phone: [phone]  Fax: [phone]  Follow Up Time:

## 2021-02-09 NOTE — DISCHARGE NOTE PROVIDER - NSDCFUADDINST_GEN_ALL_CORE_FT
Recommendations      Physical Therapy Recommendations: subacute rehab   Wound Specialist Recommendations: BLE:1)Clean leg2)Apply hydrofiber (aquacel) x2 to each leg posteriorly3)Apply abdominal pad x24)Kiya to keep intactTo be changed daily by RNB breast1)Clean and dry area2)apply interdryChanged when soiledB buttocks1)Clean wounds2)Apply moisture barrier pastePt. educated on the importance of offloading for pressure relief of pressure areas, need for frequent repositioning and current dressing recommendations to facilitate healing of current ulcers.        wrap legs daily with ace bandages from knee to ankle      oxygen via nasal cannula at  2-3 liters continuously

## 2021-02-09 NOTE — DISCHARGE NOTE PROVIDER - HOSPITAL COURSE
Assessment and Plan:   · Assessment	  75 years old female with emphysema with short of breath with lymphedema ulcer bilateral            Problem/Plan - 1:  ·  Problem: Essential hypertension.  Plan: continue home meds except lisinopril because of acute kidney injury  2/4/2021 bp stable , creatinine is improving.      Problem/Plan - 2:  ·  Problem: Chronic obstructive pulmonary disease with acute exacerbation.  Plan: steroids  duoneb   cta chest negative for infiltrate and PE  2/3/2021 pulm consult noted  2/7/2021  change to oral steroids  in am  2/8/2021 oral steroids to begin.      Problem/Plan - 3:  ·  Problem: Lymphedema.  Plan: physical therapy eval   wound team   supportive care  obtained matias for consult  2/6/201 vascular consult completed and order written  2/7/2021 patient  is very resistant to participating this is corroborated by her sister who lives with her. pt refuses to walk , she doesn't want her legs wrapped , I have encouraged her and d/w her an d her sister  that if she chooses not to participate in PT or improve her lymphedema she will either go home or will need long term nursing home .      Problem/Plan - 4:  ·  Problem: Acute renal failure, unspecified acute renal failure type.  Plan: intravenous hydration   renal consult - noted  2/4/2021 creatinine is improving  2/7/2021 stop IVF   2/5/2021 continued improvement  2/8/2021 resolved.   TIME SPENT COMPLETING DISCHARGE AND COORDINATING CARE WITH NURSING,  AND CASE MANAGEMENT APPROX 40MINUTES

## 2021-02-09 NOTE — PROGRESS NOTE ADULT - PROVIDER SPECIALTY LIST ADULT
Pulmonology
Nephrology
Pulmonology
Nephrology
Pulmonology
Pulmonology
Hospitalist

## 2021-02-10 ENCOUNTER — NON-APPOINTMENT (OUTPATIENT)
Age: 76
End: 2021-02-10

## 2021-02-14 DIAGNOSIS — E78.5 HYPERLIPIDEMIA, UNSPECIFIED: ICD-10-CM

## 2021-02-14 DIAGNOSIS — L97.229 NON-PRESSURE CHRONIC ULCER OF LEFT CALF WITH UNSPECIFIED SEVERITY: ICD-10-CM

## 2021-02-14 DIAGNOSIS — E87.0 HYPEROSMOLALITY AND HYPERNATREMIA: ICD-10-CM

## 2021-02-14 DIAGNOSIS — I89.0 LYMPHEDEMA, NOT ELSEWHERE CLASSIFIED: ICD-10-CM

## 2021-02-14 DIAGNOSIS — Z87.891 PERSONAL HISTORY OF NICOTINE DEPENDENCE: ICD-10-CM

## 2021-02-14 DIAGNOSIS — I83.012 VARICOSE VEINS OF RIGHT LOWER EXTREMITY WITH ULCER OF CALF: ICD-10-CM

## 2021-02-14 DIAGNOSIS — D64.9 ANEMIA, UNSPECIFIED: ICD-10-CM

## 2021-02-14 DIAGNOSIS — I10 ESSENTIAL (PRIMARY) HYPERTENSION: ICD-10-CM

## 2021-02-14 DIAGNOSIS — E66.01 MORBID (SEVERE) OBESITY DUE TO EXCESS CALORIES: ICD-10-CM

## 2021-02-14 DIAGNOSIS — R44.3 HALLUCINATIONS, UNSPECIFIED: ICD-10-CM

## 2021-02-14 DIAGNOSIS — J43.9 EMPHYSEMA, UNSPECIFIED: ICD-10-CM

## 2021-02-14 DIAGNOSIS — Z79.01 LONG TERM (CURRENT) USE OF ANTICOAGULANTS: ICD-10-CM

## 2021-02-14 DIAGNOSIS — N17.9 ACUTE KIDNEY FAILURE, UNSPECIFIED: ICD-10-CM

## 2021-02-14 DIAGNOSIS — I83.022 VARICOSE VEINS OF LEFT LOWER EXTREMITY WITH ULCER OF CALF: ICD-10-CM

## 2021-02-14 DIAGNOSIS — Z74.01 BED CONFINEMENT STATUS: ICD-10-CM

## 2021-02-14 DIAGNOSIS — R06.02 SHORTNESS OF BREATH: ICD-10-CM

## 2021-02-14 DIAGNOSIS — E86.0 DEHYDRATION: ICD-10-CM

## 2021-02-14 DIAGNOSIS — L97.219 NON-PRESSURE CHRONIC ULCER OF RIGHT CALF WITH UNSPECIFIED SEVERITY: ICD-10-CM

## 2021-03-11 NOTE — CONSULT NOTE ADULT - PROVIDER SPECIALTY LIST ADULT
Outpatient Physical Therapy Ortho Treatment Note  [facility]     Patient Name: Wayne Ayala  : 1951  MRN: [id number]  Today's Date: 3/11/2021      Visit Date: 2021   Attendance: 2   Subjective improvement: n/a  Recert: 3/26/21  MD Appointment: 3/22/21      Visit Dx:    ICD-10-CM ICD-9-CM   1. Chronic back pain greater than 3 months duration  M54.9 724.5    G89.29 338.29       There is no problem list on file for this patient.       Past Medical History:   Diagnosis Date   • Arthritis    • Gout    • Hypertension         Past Surgical History:   Procedure Laterality Date   • COLONOSCOPY     • LUMBAR LAMINECTOMY Bilateral        PT Ortho     Row Name 21 1000       Precautions and Contraindications    Precautions/Limitations  spinal precautions  -EM       Posture/Observations    Posture/Observations Comments  Pt amb with heavy use of FWRW with FF posture.-  -EM      User Key  (r) = Recorded By, (t) = Taken By, (c) = Cosigned By    Initials Name Provider Type    EM Jairo Mcintyre, PTA Physical Therapy Assistant                      PT Assessment/Plan     Row Name 21 1000          PT Assessment    Functional Limitations  Impaired gait;Performance in work activities;Performance in sport activities;Performance in self-care ADL;Performance in leisure activities  -EM     Impairments  Endurance;Gait;Impaired flexibility;Range of motion;Sensation;Muscle strength;Pain  -EM     Assessment Comments  Pt nevaeh tx well with no increase of pain post tx. Pt does display slight active DF this tx.  Numbness remains B feet L>R . Pt amb with heavy use of FWRW with decreased hip flexion and DF noted. L LE seems to be much more involved with tightness and weakness than R LE.   -EM     Rehab Potential  Fair  -EM     Patient/caregiver participated in establishment of treatment plan and goals  Yes  -EM     Patient would benefit from skilled therapy intervention  Yes  -EM        PT Plan    PT Frequency   Podiatry "2x/week  -EM     Predicted Duration of Therapy Intervention (PT)  3 wks  -EM     PT Plan Comments  Cont to work on stretching and gentle srengthening of L-Spine. Cont NMES to L ant tib.  -EM       User Key  (r) = Recorded By, (t) = Taken By, (c) = Cosigned By    Initials Name Provider Type    Jairo Henley PTA Physical Therapy Assistant          Modalities     Row Name 03/11/21 1000             Precautions    Existing Precautions/Restrictions  spinal  -EM         Subjective Comments    Subjective Comments  Pt has OT eval post tx. \"Im doing well today.\" Pt states he doesnt have the burning when he walks, but states since surgery he has increased numbness B feet L>R in addition to L DF ankle weakness.   -EM         ELECTRICAL STIMULATION    Attended/Unattended  Unattended 10:20  -EM      Stimulation Type  [place] w/ DF  -EM      Max mAmp  90  -EM      Location/Electrode Placement/Other  L Ant Tib 10'  -EM        User Key  (r) = Recorded By, (t) = Taken By, (c) = Cosigned By    Initials Name Provider Type    Jairo Henley PTA Physical Therapy Assistant        OP Exercises     Row Name 03/11/21 1000             Precautions    Existing Precautions/Restrictions  spinal  -EM         Subjective Comments    Subjective Comments  Pt has OT eval post tx. \"Im doing well today.\" Pt states he doesnt have the burning when he walks, but states since surgery he has increased numbness B feet L>R in addition to L DF ankle weakness.   -EM         Subjective Pain    Able to rate subjective pain?  yes  -EM      Pre-Treatment Pain Level  3  -EM      Post-Treatment Pain Level  3  -EM         Exercise 1    Exercise Name 1  PRO II-Seat 10-4.0  -EM      Time 1  10'  -EM         Exercise 2    Exercise Name 2  NMES L Ant Tib  -EM      Time 2  10  -EM         Exercise 3    Exercise Name 3  B St Ham S  -EM      Sets 3  3  -EM      Time 3  30\"  -EM      Additional Comments  Tightness L>R   -EM         Exercise 4    Exercise Name 4  B " "Seated Piriformis S  -EM      Sets 4  3  -EM      Time 4  30\"  -EM        User Key  (r) = Recorded By, (t) = Taken By, (c) = Cosigned By    Initials Name Provider Type    Jairo Henley PTA Physical Therapy Assistant                       PT OP Goals     Row Name 03/11/21 1000          PT Short Term Goals    STG Date to Achieve  03/19/21  -EM     STG 1  Pt indepe with HEP for lumbar stabilization and lumbar mobility  -EM     STG 1 Progress  Not Met  -EM     STG 2  Improve L ankle MMT to 2+/5  -EM     STG 2 Progress  Not Met  -EM     STG 3  Improve light touch sensation by 25-50% with B feet  -EM     STG 3 Progress  Not Met  -EM     STG 4  Able to ambulate fair erect (neutral lumbar ext) with RW on level surfaces  -EM     STG 4 Progress  New;Not Met;Goal Revised  -EM        Long Term Goals    LTG Date to Achieve  04/02/21  -EM     LTG 1  TBD  -EM        Time Calculation    PT Goal Re-Cert Due Date  03/26/21  -EM       User Key  (r) = Recorded By, (t) = Taken By, (c) = Cosigned By    Initials Name Provider Type    Jairo Henley PTA Physical Therapy Assistant                         Time Calculation:   Start Time: 1019  Stop Time: 1105  Time Calculation (min): 46 min  Total Timed Code Minutes- PT: 46 minute(s)  Therapy Charges for Today     Code Description Service Date Service Provider Modifiers Qty    56204925150 HC PT THER PROC EA 15 MIN 3/11/2021 Jairo Mcintyre PTA GP 2    24158071603 HC PT NEUROMUSC RE EDUCATION EA 15 MIN 3/11/2021 Jairo Mcintyre PTA GP 1                    Jairo Mcintyre PTA  3/11/2021     "

## 2021-06-29 ENCOUNTER — NON-APPOINTMENT (OUTPATIENT)
Age: 76
End: 2021-06-29

## 2021-12-01 NOTE — PROGRESS NOTE ADULT - PROBLEM/PLAN-3
DISPLAY PLAN FREE TEXT
03-Dec-2021

## 2023-12-31 PROBLEM — Z23 NEED FOR VACCINATION WITH 13-POLYVALENT PNEUMOCOCCAL CONJUGATE VACCINE: Status: ACTIVE | Noted: 2018-05-04

## 2024-01-10 NOTE — H&P ADULT - CLICK TO LAUNCH ORM
4 Eyes Skin Assessment     NAME:  Ye Joseph  YOB: 1973  MEDICAL RECORD NUMBER:  [id number]    The patient is being assessed for  Other weekly skin assessment     I agree that at least one RN has performed a thorough Head to Toe Skin Assessment on the patient. ALL assessment sites listed below have been assessed.      Areas assessed by both nurses:    Head, Face, Ears, Shoulders, Back, Chest, Arms, Elbows, Hands, Sacrum. Buttock, Coccyx, Ischium, Legs. Feet and Heels, and Under Medical Devices         Does the Patient have a Wound? Wound to TAYLA        Yoan Prevention initiated by RN: Yes  Wound Care Orders initiated by RN: Yes    Pressure Injury (Stage 3,4, Unstageable, DTI, NWPT, and Complex wounds) if present, place Wound referral order by RN under : No    New Ostomies, if present place, Ostomy referral order under : No     Nurse 1 eSignature: Electronically signed by Elise Varela RN on 1/10/24 at 3:22 PM EST    **SHARE this note so that the co-signing nurse can place an eSignature**    Nurse 2 eSignature: Electronically signed by Pina Boston RN on 1/10/24 at 4:24 PM EST    .

## 2025-04-02 NOTE — DIETITIAN INITIAL EVALUATION ADULT. - NS FNS CHANGE IN WEIGHT
[facility]: 4/2/2025 9:22 AM   Actions taken: Flowsheet accepted
Gain